# Patient Record
Sex: MALE | Race: WHITE | NOT HISPANIC OR LATINO | Employment: OTHER | URBAN - METROPOLITAN AREA
[De-identification: names, ages, dates, MRNs, and addresses within clinical notes are randomized per-mention and may not be internally consistent; named-entity substitution may affect disease eponyms.]

---

## 2017-03-06 ENCOUNTER — MYC MEDICAL ADVICE (OUTPATIENT)
Dept: FAMILY MEDICINE | Facility: CLINIC | Age: 67
End: 2017-03-06

## 2017-03-06 ENCOUNTER — OFFICE VISIT (OUTPATIENT)
Dept: FAMILY MEDICINE | Facility: CLINIC | Age: 67
End: 2017-03-06
Payer: COMMERCIAL

## 2017-03-06 ENCOUNTER — RADIANT APPOINTMENT (OUTPATIENT)
Dept: GENERAL RADIOLOGY | Facility: CLINIC | Age: 67
End: 2017-03-06
Attending: NURSE PRACTITIONER
Payer: COMMERCIAL

## 2017-03-06 VITALS
DIASTOLIC BLOOD PRESSURE: 78 MMHG | HEART RATE: 91 BPM | WEIGHT: 220.5 LBS | SYSTOLIC BLOOD PRESSURE: 126 MMHG | RESPIRATION RATE: 18 BRPM | OXYGEN SATURATION: 98 % | BODY MASS INDEX: 34.54 KG/M2 | TEMPERATURE: 98.5 F

## 2017-03-06 DIAGNOSIS — R05.9 COUGH: ICD-10-CM

## 2017-03-06 DIAGNOSIS — J18.9 PNEUMONIA OF RIGHT LOWER LOBE DUE TO INFECTIOUS ORGANISM: Primary | ICD-10-CM

## 2017-03-06 PROCEDURE — 71020 XR CHEST 2 VW: CPT

## 2017-03-06 PROCEDURE — 99214 OFFICE O/P EST MOD 30 MIN: CPT | Performed by: NURSE PRACTITIONER

## 2017-03-06 RX ORDER — AZITHROMYCIN 250 MG/1
TABLET, FILM COATED ORAL
Qty: 6 TABLET | Refills: 0 | Status: SHIPPED | OUTPATIENT
Start: 2017-03-06 | End: 2017-03-13

## 2017-03-06 RX ORDER — ALBUTEROL SULFATE 90 UG/1
2 AEROSOL, METERED RESPIRATORY (INHALATION) EVERY 4 HOURS PRN
Qty: 1 INHALER | Refills: 0 | Status: SHIPPED | OUTPATIENT
Start: 2017-03-06 | End: 2017-05-22

## 2017-03-06 RX ORDER — AZITHROMYCIN 250 MG/1
TABLET, FILM COATED ORAL
Qty: 6 TABLET | Refills: 0 | Status: SHIPPED
Start: 2017-03-06 | End: 2017-03-06

## 2017-03-06 NOTE — NURSING NOTE
"Chief Complaint   Patient presents with     URI       Initial /78  Pulse 91  Temp 98.5  F (36.9  C) (Oral)  Resp 18  Wt 220 lb 8 oz (100 kg)  SpO2 98%  BMI 34.54 kg/m2 Estimated body mass index is 34.54 kg/(m^2) as calculated from the following:    Height as of 8/19/16: 5' 7\" (1.702 m).    Weight as of this encounter: 220 lb 8 oz (100 kg).  Medication Reconciliation: complete     Minnie Espinoza MA      "

## 2017-03-06 NOTE — MR AVS SNAPSHOT
After Visit Summary   3/6/2017    Braeden Umana    MRN: [id number]           Patient Information     Date Of Birth          1950        Visit Information        Provider Department      3/6/2017 9:00 AM Treva Saleem NP [facility]        Today's Diagnoses     Pneumonia of right lower lobe due to infectious organism    -  1    Cough          Care Instructions      Pneumonia (Adult)  Pneumonia is an infection deep within the lungs. It is in the small air sacs (alveoli). Pneumonia may be caused by a virus or bacteria. Pneumonia caused by bacteria is usually treated with an antibiotic. Severe cases may need to be treated in the hospital. Milder cases can be treated at home. Symptoms usually start to get better during the first 2 days of treatment.    Home care  Follow these guidelines when caring for yourself at home:    Rest at home for the first 2 to 3 days, or until you feel stronger. Don t let yourself get overly tired when you go back to your activities.    Stay away from cigarette smoke - yours or other people s.    You may use acetaminophen or ibuprofen to control fever or pain, unless another medicine was prescribed. If you have chronic liver or kidney disease, talk with your health care provider before using these medicines. Also talk with your provider if you ve had a stomach ulcer or GI bleeding. Don t give aspirin to anyone younger than 18 years of age who is ill with a fever. It may cause severe liver damage.    Your appetite may be poor, so a light diet is fine.    Drink 6 to 8 glasses of fluids every day to make sure you are getting enough fluids. Beverages can include water, sport drinks, sodas without caffeine, juices, tea, or soup. Fluids will help loosen secretions in the lung. This will make it easier for you to cough up the phlegm (sputum). If you also have heart or kidney disease, check with your health care provider before you drink extra  fluids.    Take antibiotic medicine prescribed until it is all gone, even if you are feeling better after a few days.  Follow-up care  Follow up with your health care provider in the next 2 to 3 days, or as advised. This is to be sure the medicine is helping you get better.  If you are 65 or older, you should get a pneumococcal vaccine and a yearly flu (influenza) shot. You should also get these vaccines if you have chronic lung disease like asthma, emphysema, or COPD. Ask your provider about this.  When to seek medical advice  Call your health care provider right away if any of these occur:    You don t get better within the first 48 hours of treatment    Shortness of breath gets worse    Rapid breathing (more than 25 breaths per minute)    Coughing up blood    Chest pain gets worse with breathing    Fever of 102 F (38 C) or higher that doesn t get better with fever medicine    Weakness, dizziness, or fainting that gets worse    Thirst or dry mouth that gets worse    Sinus pain, headache, or a stiff neck    Chest pain not caused by coughing    4191-4709 [employer]. [address] [place] [address]. All rights reserved. This information is not intended as a substitute for professional medical care. Always follow your healthcare professional's instructions.              Follow-ups after your visit        Who to contact     If you have questions or need follow up information about today's clinic visit or your schedule please contact [facility] directly at [phone].  Normal or non-critical lab and imaging results will be communicated to you by MyChart, letter or phone within 4 business days after the clinic has received the results. If you do not hear from us within 7 days, please contact the clinic through MyChart or phone. If you have a critical or abnormal lab result, we will notify you by phone as soon as possible.  Submit refill requests through [employer]Co[employer] or call your  pharmacy and they will forward the refill request to us. Please allow 3 business days for your refill to be completed.          Additional Information About Your Visit        [employer] Care[employer] Information     Zuki gives you secure access to your electronic health record. If you see a primary care provider, you can also send messages to your care team and make appointments. If you have questions, please call your primary care clinic.  If you do not have a primary care provider, please call [phone] and they will assist you.        Care EveryWhere ID     This is your Care EveryWhere ID. This could be used by other organizations to access your [place] medical records  [id number]        Your Vitals Were     Pulse Temperature Respirations Pulse Oximetry BMI (Body Mass Index)       91 98.5  F (36.9  C) (Oral) 18 98% 34.54 kg/m2        Blood Pressure from Last 3 Encounters:   03/06/17 126/78   11/14/16 130/76   11/08/16 128/68    Weight from Last 3 Encounters:   03/06/17 220 lb 8 oz (100 kg)   11/14/16 228 lb (103.4 kg)   11/08/16 229 lb (103.9 kg)                 Today's Medication Changes          These changes are accurate as of: 3/6/17 10:03 AM.  If you have any questions, ask your nurse or doctor.               Start taking these medicines.        Dose/Directions    albuterol 108 (90 BASE) MCG/ACT Inhaler   Commonly known as:  PROAIR HFA/PROVENTIL HFA/VENTOLIN HFA   Used for:  Pneumonia of right lower lobe due to infectious organism   Started by:  Treva Saleem NP        Dose:  2 puff   Inhale 2 puffs into the lungs every 4 hours as needed for shortness of breath / dyspnea or wheezing   Quantity:  1 Inhaler   Refills:  0       azithromycin 250 MG tablet   Commonly known as:  ZITHROMAX   Used for:  Pneumonia of right lower lobe due to infectious organism   Started by:  Treva Saleem NP        two tablets first day and 1 tablet daily for 4 days   Quantity:  6 tablet   Refills:  0            Where to get your  medicines      These medications were sent to [facility] [place], [place] - [address]  [address], [place] [address]     Phone:  [phone]     albuterol 108 (90 BASE) MCG/ACT Inhaler    azithromycin 250 MG tablet                Primary Care Provider Office Phone # Fax #    Treva ANGELO LINDA Saleem [phone]       [facility] [address] [place] [address]  [facility] [address]        Thank you!     Thank you for choosing [facility]  for your care. Our goal is always to provide you with excellent care. Hearing back from our patients is one way we can continue to improve our services. Please take a few minutes to complete the written survey that you may receive in the mail after your visit with us. Thank you!             Your Updated Medication List - Protect others around you: Learn how to safely use, store and throw away your medicines at www.disposemymeds.org.          This list is accurate as of: 3/6/17 10:03 AM.  Always use your most recent med list.                   Brand Name Dispense Instructions for use    albuterol 108 (90 BASE) MCG/ACT Inhaler    PROAIR HFA/PROVENTIL HFA/VENTOLIN HFA    1 Inhaler    Inhale 2 puffs into the lungs every 4 hours as needed for shortness of breath / dyspnea or wheezing       ALPRAZolam 0.25 MG tablet    XANAX     Take 0.25 mg by mouth 3 times daily as needed.       amLODIPine 10 MG tablet    NORVASC    90 tablet    Take 1 tablet (10 mg) by mouth daily       aspirin 325 MG tablet      Take  by mouth daily.       atenolol 50 MG tablet    TENORMIN    90 tablet    Take 50 mg by mouth daily       azithromycin 250 MG tablet    ZITHROMAX    6 tablet    two tablets first day and 1 tablet daily for 4 days       BENADRYL 25 MG tablet   Generic drug:  diphenhydrAMINE      Take 25 mg by mouth every 6 hours as needed.       blood glucose monitoring meter device kit          CYMBALTA 60 MG EC capsule   Generic drug:   DULoxetine     0    1 CAPSULE ONCE DAILY       ezetimibe 10 MG tablet    ZETIA    90 tablet    Take 1 tablet (10 mg) by mouth daily       MULTIVITAMIN PO          NEURONTIN 300 MG capsule   Generic drug:  gabapentin     90 capsule    Take 1 capsule (300 mg) by mouth 2 times daily       nitroglycerin 0.4 MG sublingual tablet    NITROSTAT    1 tablet    Place 1 tablet under the tongue every 5 minutes as needed for chest pain. Given in clinic today at 9:30 am after consulting with DONAVAN Saleem PCP.       order for DME     1 Units    Equipment being ordered: home blood pressure monitor       PLAVIX 75 MG tablet   Generic drug:  clopidogrel     3 MONTHS    ONE DAILY       RANEXA 500 MG 12 hr tablet   Generic drug:  ranolazine      Take 500 mg by mouth daily       rosuvastatin 5 MG tablet    CRESTOR    90 tablet    Take 1 tablet (5 mg) by mouth daily

## 2017-03-06 NOTE — PROGRESS NOTES
SUBJECTIVE:                                                    Braeden Umana is a 67 year old male who presents to clinic today for the following health issues:  Pt stopped taking Crestor due to muscle cramps/aches     RESPIRATORY SYMPTOMS      Duration: this will be day 7     Description  Pt states that he moved, feeling exhausted, about a week after they moved he experienced upper respiratory symptoms. Pt states they were pulling up old carpet. 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   He has been sleeping for 20 hours per day for the last 7 days, cough will not go away, mild nausea, no fever    History (predisposing factors):  Pt's wife has similar symptoms 2 days before pt had symptoms. Pt's wife went to the hospital; not the flu.     Precipitating or alleviating factors: None    Therapies tried and outcome:  Advil PRN helps a little bit with aches      Shortness of breath, especially with any activity.  No wheezing.  Feeling of tightness in chest.  Cough is non-productive, worse with activity.   Nasal congestion, some sinus pressure.   Myalgias.        Problem list and histories reviewed & adjusted, as indicated.  Additional history: as documented        Reviewed and updated as needed this visit by clinical staff       Reviewed and updated as needed this visit by Provider         ROS:  C: NEGATIVE for fever, chills, change in weight  ENT/MOUTH: see HPI  RESP:see HPI  CV: NEGATIVE for chest pain, palpitations or peripheral edema  GI: NEGATIVE for nausea, abdominal pain, heartburn, or change in bowel habits    OBJECTIVE:                                                    /78  Pulse 91  Temp 98.5  F (36.9  C) (Oral)  Resp 18  Wt 220 lb 8 oz (100 kg)  SpO2 98%  BMI 34.54 kg/m2  Body mass index is 34.54 kg/(m^2).  GENERAL: healthy, alert and no distress  HENT: normal cephalic/atraumatic, ear canals and TM's normal, nose and  mouth without ulcers or lesions, oropharynx clear, oral mucous membranes moist and sinuses: maxillary tenderness on bilaterally  NECK: no adenopathy, no asymmetry, masses, or scars and thyroid normal to palpation  RESP: rhonchi bilaterally  CV: regular rate and rhythm, normal S1 S2, no S3 or S4, no murmur, click or rub, no peripheral edema and peripheral pulses strong  MS: no gross musculoskeletal defects noted, no edema  SKIN: no suspicious lesions or rashes         ASSESSMENT/PLAN:                                                            1. Pneumonia of right lower lobe due to infectious organism  Possible haziness RLL, read as clear by radiology.  Follow up in one week or sooner if symptoms worsen.    - albuterol (PROAIR HFA/PROVENTIL HFA/VENTOLIN HFA) 108 (90 BASE) MCG/ACT Inhaler; Inhale 2 puffs into the lungs every 4 hours as needed for shortness of breath / dyspnea or wheezing  Dispense: 1 Inhaler; Refill: 0  - azithromycin (ZITHROMAX) 250 MG tablet; two tablets first day and 1 tablet daily for 4 days  Dispense: 6 tablet; Refill: 0    2. Cough    - XR Chest 2 Views; Future        Treva Saleem NP  [facility]

## 2017-03-06 NOTE — PATIENT INSTRUCTIONS
Pneumonia (Adult)  Pneumonia is an infection deep within the lungs. It is in the small air sacs (alveoli). Pneumonia may be caused by a virus or bacteria. Pneumonia caused by bacteria is usually treated with an antibiotic. Severe cases may need to be treated in the hospital. Milder cases can be treated at home. Symptoms usually start to get better during the first 2 days of treatment.    Home care  Follow these guidelines when caring for yourself at home:    Rest at home for the first 2 to 3 days, or until you feel stronger. Don t let yourself get overly tired when you go back to your activities.    Stay away from cigarette smoke - yours or other people s.    You may use acetaminophen or ibuprofen to control fever or pain, unless another medicine was prescribed. If you have chronic liver or kidney disease, talk with your health care provider before using these medicines. Also talk with your provider if you ve had a stomach ulcer or GI bleeding. Don t give aspirin to anyone younger than 18 years of age who is ill with a fever. It may cause severe liver damage.    Your appetite may be poor, so a light diet is fine.    Drink 6 to 8 glasses of fluids every day to make sure you are getting enough fluids. Beverages can include water, sport drinks, sodas without caffeine, juices, tea, or soup. Fluids will help loosen secretions in the lung. This will make it easier for you to cough up the phlegm (sputum). If you also have heart or kidney disease, check with your health care provider before you drink extra fluids.    Take antibiotic medicine prescribed until it is all gone, even if you are feeling better after a few days.  Follow-up care  Follow up with your health care provider in the next 2 to 3 days, or as advised. This is to be sure the medicine is helping you get better.  If you are 65 or older, you should get a pneumococcal vaccine and a yearly flu (influenza) shot. You should also get these vaccines if you have  chronic lung disease like asthma, emphysema, or COPD. Ask your provider about this.  When to seek medical advice  Call your health care provider right away if any of these occur:    You don t get better within the first 48 hours of treatment    Shortness of breath gets worse    Rapid breathing (more than 25 breaths per minute)    Coughing up blood    Chest pain gets worse with breathing    Fever of 102 F (38 C) or higher that doesn t get better with fever medicine    Weakness, dizziness, or fainting that gets worse    Thirst or dry mouth that gets worse    Sinus pain, headache, or a stiff neck    Chest pain not caused by coughing    5515-3546 [employer]. [address] [place] [address]. All rights reserved. This information is not intended as a substitute for professional medical care. Always follow your healthcare professional's instructions.

## 2017-03-07 ASSESSMENT — PATIENT HEALTH QUESTIONNAIRE - PHQ9: SUM OF ALL RESPONSES TO PHQ QUESTIONS 1-9: 8

## 2017-03-13 ENCOUNTER — OFFICE VISIT (OUTPATIENT)
Dept: FAMILY MEDICINE | Facility: CLINIC | Age: 67
End: 2017-03-13
Payer: COMMERCIAL

## 2017-03-13 VITALS
TEMPERATURE: 97 F | SYSTOLIC BLOOD PRESSURE: 133 MMHG | RESPIRATION RATE: 18 BRPM | BODY MASS INDEX: 35.08 KG/M2 | DIASTOLIC BLOOD PRESSURE: 82 MMHG | OXYGEN SATURATION: 99 % | WEIGHT: 224 LBS | HEART RATE: 85 BPM

## 2017-03-13 DIAGNOSIS — J18.9 PNEUMONIA OF RIGHT LOWER LOBE DUE TO INFECTIOUS ORGANISM: Primary | ICD-10-CM

## 2017-03-13 PROCEDURE — 99212 OFFICE O/P EST SF 10 MIN: CPT | Performed by: NURSE PRACTITIONER

## 2017-03-13 NOTE — PROGRESS NOTES
SUBJECTIVE:  Braeden Umana, a 67 year old male scheduled an appointment to discuss the following issues:  Pneumonia of right lower lobe due to infectious organism   He is here for follow up of pneumonia on clinical exam.  The radiologist read his CXR as negative.  He finished ZPak last week.  His cough is improving.  He denies any fevers.  He still feels quite fatigued.  He has used his albuterol inhaler a few times but did not find it very helpful.   He does have some frontal sinus pressure but no nasal congestion.     Medical, social, surgical, and family histories reviewed.        OBJECTIVE:  /82  Pulse 85  Temp 97  F (36.1  C) (Oral)  Resp 18  Wt 224 lb (101.6 kg)  SpO2 99%  BMI 35.08 kg/m2  EXAM:  GENERAL APPEARANCE: healthy, alert and no distress  EYES: EOMI,  PERRL  HENT: ear canals and TM's normal and nose and mouth without ulcers or lesions  NECK: no adenopathy, no asymmetry, masses, or scars and thyroid normal to palpation  RESP: lungs clear to auscultation - no rales, rhonchi or wheezes  CV: regular rates and rhythm, normal S1 S2, no S3 or S4 and no murmur, click or rub -    ASSESSMENT/PLAN:  (J18.9) Pneumonia of right lower lobe due to infectious organism  (primary encounter diagnosis)  Comment: resolving  Plan: Discussed symptomatic treatment measures.  Follow up if symptoms worsen or he develops a fever.

## 2017-03-13 NOTE — NURSING NOTE
"Chief Complaint   Patient presents with     RECHECK       Initial /82  Pulse 85  Temp 97  F (36.1  C) (Oral)  Resp 18  Wt 224 lb (101.6 kg)  SpO2 99%  BMI 35.08 kg/m2 Estimated body mass index is 35.08 kg/(m^2) as calculated from the following:    Height as of 8/19/16: 5' 7\" (1.702 m).    Weight as of this encounter: 224 lb (101.6 kg).  Medication Reconciliation: complete     Minnie Espinoza MA      "

## 2017-03-13 NOTE — MR AVS SNAPSHOT
After Visit Summary   3/13/2017    Braeden Umana    MRN: [id number]           Patient Information     Date Of Birth          1950        Visit Information        Provider Department      3/13/2017 9:30 AM Treva Saleem NP [facility]        Today's Diagnoses     Pneumonia of right lower lobe due to infectious organism    -  1       Follow-ups after your visit        Who to contact     If you have questions or need follow up information about today's clinic visit or your schedule please contact [facility] directly at [phone].  Normal or non-critical lab and imaging results will be communicated to you by [employer] Enginehart, letter or phone within 4 business days after the clinic has received the results. If you do not hear from us within 7 days, please contact the clinic through [employer] or phone. If you have a critical or abnormal lab result, we will notify you by phone as soon as possible.  Submit refill requests through ImageTag or call your pharmacy and they will forward the refill request to us. Please allow 3 business days for your refill to be completed.          Additional Information About Your Visit        MyChart Information     ImageTag gives you secure access to your electronic health record. If you see a primary care provider, you can also send messages to your care team and make appointments. If you have questions, please call your primary care clinic.  If you do not have a primary care provider, please call [phone] and they will assist you.        Care EveryWhere ID     This is your Care EveryWhere ID. This could be used by other organizations to access your [place] medical records  [id number]        Your Vitals Were     Pulse Temperature Respirations Pulse Oximetry BMI (Body Mass Index)       85 97  F (36.1  C) (Oral) 18 99% 35.08 kg/m2        Blood Pressure from Last 3 Encounters:   03/13/17 133/82   03/06/17 126/78   11/14/16 130/76     Weight from Last 3 Encounters:   03/13/17 224 lb (101.6 kg)   03/06/17 220 lb 8 oz (100 kg)   11/14/16 228 lb (103.4 kg)              Today, you had the following     No orders found for display       Primary Care Provider Office Phone # Fax #    Treva ANGELO LINDA Saleem [phone]       [facility] [address] [place] [address]  [facility] [address]        Thank you!     Thank you for choosing [facility]  for your care. Our goal is always to provide you with excellent care. Hearing back from our patients is one way we can continue to improve our services. Please take a few minutes to complete the written survey that you may receive in the mail after your visit with us. Thank you!             Your Updated Medication List - Protect others around you: Learn how to safely use, store and throw away your medicines at www.disposemymeds.org.          This list is accurate as of: 3/13/17 10:09 AM.  Always use your most recent med list.                   Brand Name Dispense Instructions for use    albuterol 108 (90 BASE) MCG/ACT Inhaler    PROAIR HFA/PROVENTIL HFA/VENTOLIN HFA    1 Inhaler    Inhale 2 puffs into the lungs every 4 hours as needed for shortness of breath / dyspnea or wheezing       ALPRAZolam 0.25 MG tablet    XANAX     Take 0.25 mg by mouth 3 times daily as needed.       amLODIPine 10 MG tablet    NORVASC    90 tablet    Take 1 tablet (10 mg) by mouth daily       aspirin 325 MG tablet      Take  by mouth daily.       atenolol 50 MG tablet    TENORMIN    90 tablet    Take 50 mg by mouth daily       BENADRYL 25 MG tablet   Generic drug:  diphenhydrAMINE      Take 25 mg by mouth every 6 hours as needed.       blood glucose monitoring meter device kit      Reported on 3/13/2017       CYMBALTA 60 MG EC capsule   Generic drug:  DULoxetine     0    1 CAPSULE ONCE DAILY       ezetimibe 10 MG tablet    ZETIA    90 tablet    Take 1 tablet (10 mg) by mouth daily       NEURONTIN 300 MG  capsule   Generic drug:  gabapentin     90 capsule    Take 1 capsule (300 mg) by mouth 2 times daily       nitroglycerin 0.4 MG sublingual tablet    NITROSTAT    1 tablet    Place 1 tablet under the tongue every 5 minutes as needed for chest pain. Given in clinic today at 9:30 am after consulting with DONAVAN Saleem PCP.       order for DME     1 Units    Equipment being ordered: home blood pressure monitor       PLAVIX 75 MG tablet   Generic drug:  clopidogrel     3 MONTHS    ONE DAILY       RANEXA 500 MG 12 hr tablet   Generic drug:  ranolazine      Take 500 mg by mouth daily       rosuvastatin 5 MG tablet    CRESTOR    90 tablet    Take 1 tablet (5 mg) by mouth daily

## 2017-03-31 ENCOUNTER — TRANSFERRED RECORDS (OUTPATIENT)
Dept: HEALTH INFORMATION MANAGEMENT | Facility: CLINIC | Age: 67
End: 2017-03-31

## 2017-05-22 ENCOUNTER — OFFICE VISIT (OUTPATIENT)
Dept: FAMILY MEDICINE | Facility: CLINIC | Age: 67
End: 2017-05-22
Payer: COMMERCIAL

## 2017-05-22 VITALS
BODY MASS INDEX: 34.77 KG/M2 | SYSTOLIC BLOOD PRESSURE: 136 MMHG | HEART RATE: 93 BPM | OXYGEN SATURATION: 98 % | RESPIRATION RATE: 18 BRPM | DIASTOLIC BLOOD PRESSURE: 78 MMHG | WEIGHT: 222 LBS | TEMPERATURE: 97.9 F

## 2017-05-22 DIAGNOSIS — R05.9 COUGH: Primary | ICD-10-CM

## 2017-05-22 DIAGNOSIS — R21 SKIN RASH: ICD-10-CM

## 2017-05-22 PROCEDURE — 99214 OFFICE O/P EST MOD 30 MIN: CPT | Performed by: NURSE PRACTITIONER

## 2017-05-22 RX ORDER — ERYTHROMYCIN 250 MG/1
250 TABLET, DELAYED RELEASE ORAL 2 TIMES DAILY
Qty: 20 TABLET | Refills: 0 | Status: SHIPPED | OUTPATIENT
Start: 2017-05-22 | End: 2017-06-01

## 2017-05-22 NOTE — MR AVS SNAPSHOT
After Visit Summary   5/22/2017    Braeden Umana    MRN: [id number]           Patient Information     Date Of Birth          1950        Visit Information        Provider Department      5/22/2017 2:45 PM Treva Saleem NP [facility]        Today's Diagnoses     Cough    -  1    Skin rash          Care Instructions    Let me know if no improvement with the cough over the next few weeks or sooner if symptoms worsen.    Also let me know if the skin infection does not completely clear.         Follow-ups after your visit        Future tests that were ordered for you today     Open Future Orders        Priority Expected Expires Ordered    Spirometry, Breathing Capacity Routine  7/6/2017 5/22/2017            Who to contact     If you have questions or need follow up information about today's clinic visit or your schedule please contact Critical access hospital directly at [phone].  Normal or non-critical lab and imaging results will be communicated to you by Newswiredhart, letter or phone within 4 business days after the clinic has received the results. If you do not hear from us within 7 days, please contact the clinic through Newswiredhart or phone. If you have a critical or abnormal lab result, we will notify you by phone as soon as possible.  Submit refill requests through Fiestah or call your pharmacy and they will forward the refill request to us. Please allow 3 business days for your refill to be completed.          Additional Information About Your Visit        MyChart Information     Fiestah gives you secure access to your electronic health record. If you see a primary care provider, you can also send messages to your care team and make appointments. If you have questions, please call your primary care clinic.  If you do not have a primary care provider, please call [phone] and they will assist you.        Care EveryWhere ID     This is your Care EveryWhere ID.  This could be used by other organizations to access your [facility] records  [id number]        Your Vitals Were     Pulse Temperature Respirations Pulse Oximetry BMI (Body Mass Index)       93 97.9  F (36.6  C) (Oral) 18 98% 34.77 kg/m2        Blood Pressure from Last 3 Encounters:   05/22/17 136/78   03/13/17 133/82   03/06/17 126/78    Weight from Last 3 Encounters:   05/22/17 222 lb (100.7 kg)   03/13/17 224 lb (101.6 kg)   03/06/17 220 lb 8 oz (100 kg)                 Today's Medication Changes          These changes are accurate as of: 5/22/17  3:46 PM.  If you have any questions, ask your nurse or doctor.               Start taking these medicines.        Dose/Directions    beclomethasone 40 MCG/ACT Inhaler   Commonly known as:  QVAR   Used for:  Cough        Dose:  2 puff   Inhale 2 puffs into the lungs 2 times daily   Quantity:  1 Inhaler   Refills:  0       erythromycin 250 MG Tbec EC tablet   Commonly known as:  NITA-TAB   Used for:  Skin rash        Dose:  250 mg   Take 1 tablet (250 mg) by mouth 2 times daily for 10 days   Quantity:  20 tablet   Refills:  0            Where to get your medicines      These medications were sent to [facility] - [place], [place] - [address]  [address], [place] [address]     Phone:  [phone]     beclomethasone 40 MCG/ACT Inhaler    erythromycin 250 MG Tbec EC tablet                Primary Care Provider Office Phone # Fax #    Treva Saleem [phone]       [facility] [address] [facility] [address]        Thank you!     Thank you for choosing [facility]  for your care. Our goal is always to provide you with excellent care. Hearing back from our patients is one way we can continue to improve our services. Please take a few minutes to complete the written survey that you may receive in the mail after your visit with us. Thank you!             Your Updated Medication List  - Protect others around you: Learn how to safely use, store and throw away your medicines at www.disposemymeds.org.          This list is accurate as of: 5/22/17  3:46 PM.  Always use your most recent med list.                   Brand Name Dispense Instructions for use    ALPRAZolam 0.25 MG tablet    XANAX     Take 0.25 mg by mouth 3 times daily as needed.       amLODIPine 10 MG tablet    NORVASC    90 tablet    Take 1 tablet (10 mg) by mouth daily       aspirin 325 MG tablet      Take  by mouth daily.       atenolol 50 MG tablet    TENORMIN    90 tablet    Take 50 mg by mouth daily       beclomethasone 40 MCG/ACT Inhaler    QVAR    1 Inhaler    Inhale 2 puffs into the lungs 2 times daily       BENADRYL 25 MG tablet   Generic drug:  diphenhydrAMINE      Take 25 mg by mouth every 6 hours as needed.       CYMBALTA 60 MG EC capsule   Generic drug:  DULoxetine     0    1 CAPSULE ONCE DAILY       erythromycin 250 MG Tbec EC tablet    NITA-TAB    20 tablet    Take 1 tablet (250 mg) by mouth 2 times daily for 10 days       ezetimibe 10 MG tablet    ZETIA    90 tablet    Take 1 tablet (10 mg) by mouth daily       NEURONTIN 300 MG capsule   Generic drug:  gabapentin     90 capsule    Take 1 capsule (300 mg) by mouth 2 times daily       nitroglycerin 0.4 MG sublingual tablet    NITROSTAT    1 tablet    Place 0.4 mg under the tongue every 5 minutes as needed for chest pain Reported on 5/22/2017       order for DME     1 Units    Equipment being ordered: home blood pressure monitor       PLAVIX 75 MG tablet   Generic drug:  clopidogrel     3 MONTHS    ONE DAILY       RANEXA 500 MG 12 hr tablet   Generic drug:  ranolazine      Take 500 mg by mouth daily

## 2017-05-22 NOTE — PROGRESS NOTES
"  SUBJECTIVE:                                                    Braeden Umana is a 67 year old male who presents to clinic today for the following health issues:      RESPIRATORY SYMPTOMS      Duration: In February he had URI that lingered. Since that time he has occasional symptoms     Accompanying signs and symptoms: intermittent chest tightness, cough. Related to asthma?    History (predisposing factors):  asthma    Precipitating or alleviating factors: None    Therapies tried and outcome:  None. He did not refill his inhaler   He was treated with ZPak and albuterol.  CXR was read as negative.  Shallow cough, upper chest tightness intermittently.  May occur about half of the days and can last hours or less.  Feels \"dry\".  Not worse with eating or lying down.  Exercise might make it worse.  It is not typical of his heart symptoms.   Possible seasonal allergies.  Used all of the albuterol inhaler.    No fevers.  No post nasal drainage, sinus pressure.   No acid reflux.          Chronic infection in anal region  Culture showed streptococcus. He was treated with EES which he felt helped 95 %.  But symptoms gradually returned.         Problem list and histories reviewed & adjusted, as indicated.  Additional history: as documented        Reviewed and updated as needed this visit by clinical staff       Reviewed and updated as needed this visit by Provider         ROS:  C: NEGATIVE for fever, chills, change in weight  INTEGUMENTARY/SKIN: see HPI  E/M: NEGATIVE for ear, mouth and throat problems  RESP:see HPI  CV: NEGATIVE for chest pain, palpitations or peripheral edema  GI: NEGATIVE for nausea, abdominal pain, heartburn, or change in bowel habits  MUSCULOSKELETAL: NEGATIVE for significant arthralgias or myalgia  NEURO: NEGATIVE for weakness, dizziness or paresthesias  PSYCHIATRIC: NEGATIVE for changes in mood or affect    OBJECTIVE:                                                    /78  Pulse 93  Temp " 97.9  F (36.6  C) (Oral)  Resp 18  Wt 222 lb (100.7 kg)  SpO2 98%  BMI 34.77 kg/m2  Body mass index is 34.77 kg/(m^2).  GENERAL: healthy, alert and no distress  HENT: ear canals and TM's normal, nose and mouth without ulcers or lesions  NECK: no adenopathy, no asymmetry, masses, or scars and thyroid normal to palpation  RESP: lungs clear to auscultation - no rales, rhonchi or wheezes  CV: regular rate and rhythm, normal S1 S2, no S3 or S4, no murmur, click or rub, no peripheral edema and peripheral pulses strong  ABDOMEN: soft, nontender, no hepatosplenomegaly, no masses and bowel sounds normal  MS: no gross musculoskeletal defects noted, no edema  PSYCH: mentation appears normal, affect normal/bright         ASSESSMENT/PLAN:                                                            1. Cough  Unable to do a spirometry due to equipment not functioning and he declines a referral for PFTs.  Differential includes post-viral cough, allergies, asthma, GERD  Will try an inhaled steroid.  If symptoms are not improving over the next 2-3 weeks will consider a trial of a PPI and referral to pulmonology.    - beclomethasone (QVAR) 40 MCG/ACT Inhaler; Inhale 2 puffs into the lungs 2 times daily  Dispense: 1 Inhaler; Refill: 0    2. Skin rash  Will do a longer course of antibiotic.  He will follow up if symptoms persist.   - erythromycin (NITA-TAB) 250 MG TBEC EC tablet; Take 1 tablet (250 mg) by mouth 2 times daily for 10 days  Dispense: 20 tablet; Refill: 0        Treva Saleem NP  [facility]

## 2017-05-22 NOTE — NURSING NOTE
"Chief Complaint   Patient presents with     URI     Rectal Problem       Initial /78  Pulse 93  Temp 97.9  F (36.6  C) (Oral)  Resp 18  Wt 222 lb (100.7 kg)  SpO2 98%  BMI 34.77 kg/m2 Estimated body mass index is 34.77 kg/(m^2) as calculated from the following:    Height as of 8/19/16: 5' 7\" (1.702 m).    Weight as of this encounter: 222 lb (100.7 kg).  Medication Reconciliation: complete     Minnie Espinoza MA      "

## 2017-05-22 NOTE — PATIENT INSTRUCTIONS
Let me know if no improvement with the cough over the next few weeks or sooner if symptoms worsen.    Also let me know if the skin infection does not completely clear.

## 2017-05-22 NOTE — LETTER
My Depression Action Plan  Name: Braeden Umana   Date of Birth 1950  Date: 5/22/2017    My doctor: Treva Saleem   My clinic: [address] [id number] [phone]          GREEN    ZONE   Good Control    What it looks like:     Things are going generally well. You have normal up s and down s. You may even feel depressed from time to time, but bad moods usually last less than a day.   What you need to do:  1. Continue to care for yourself (see self care plan)  2. Check your depression survival kit and update it as needed  3. Follow your physician s recommendations including any medication.  4. Do not stop taking medication unless you consult with your physician first.           YELLOW         ZONE Getting Worse    What it looks like:     Depression is starting to interfere with your life.     It may be hard to get out of bed; you may be starting to isolate yourself from others.    Symptoms of depression are starting to last most all day and this has happened for several days.     You may have suicidal thoughts but they are not constant.   What you need to do:     1. Call your care team, your response to treatment will improve if you keep your care team informed of your progress. Yellow periods are signs an adjustment may need to be made.     2. Continue your self-care, even if you have to fake it!    3. Talk to someone in your support network    4. Open up your depression survival kit           RED    ZONE Medical Alert - Get Help    What it looks like:     Depression is seriously interfering with your life.     You may experience these or other symptoms: You can t get out of bed most days, can t work or engage in other necessary activities, you have trouble taking care of basic hygiene, or basic responsibilities, thoughts of suicide or death that will not go away, self-injurious behavior.     What you need to do:  1. Call your care team  and request a same-day appointment. If they are not available (weekends or after hours) call your local crisis line, emergency room or 911.      Electronically signed by: Minnie Espinoza, May 22, 2017    Depression Self Care Plan / Survival Kit    Self-Care for Depression  Here s the deal. Your body and mind are really not as separate as most people think.  What you do and think affects how you feel and how you feel influences what you do and think. This means if you do things that people who feel good do, it will help you feel better.  Sometimes this is all it takes.  There is also a place for medication and therapy depending on how severe your depression is, so be sure to consult with your medical provider and/ or Behavioral Health Consultant if your symptoms are worsening or not improving.     In order to better manage my stress, I will:    Exercise  Get some form of exercise, every day. This will help reduce pain and release endorphins, the  feel good  chemicals in your brain. This is almost as good as taking antidepressants!  This is not the same as joining a gym and then never going! (they count on that by the way ) It can be as simple as just going for a walk or doing some gardening, anything that will get you moving.      Hygiene   Maintain good hygiene (Get out of bed in the morning, Make your bed, Brush your teeth, Take a shower, and Get dressed like you were going to work, even if you are unemployed).  If your clothes don't fit try to get ones that do.    Diet  I will strive to eat foods that are good for me, drink plenty of water, and avoid excessive sugar, caffeine, alcohol, and other mood-altering substances.  Some foods that are helpful in depression are: complex carbohydrates, B vitamins, flaxseed, fish or fish oil, fresh fruits and vegetables.    Psychotherapy  I agree to participate in Individual Therapy (if recommended).    Medication  If prescribed medications, I agree to take them.  Missing doses  can result in serious side effects.  I understand that drinking alcohol, or other illicit drug use, may cause potential side effects.  I will not stop my medication abruptly without first discussing it with my provider.    Staying Connected With Others  I will stay in touch with my friends, family members, and my primary care provider/team.    Use your imagination  Be creative.  We all have a creative side; it doesn t matter if it s oil painting, sand castles, or mud pies! This will also kick up the endorphins.    Witness Beauty  (AKA stop and smell the roses) Take a look outside, even in mid-winter. Notice colors, textures. Watch the squirrels and birds.     Service to others  Be of service to others.  There is always someone else in need.  By helping others we can  get out of ourselves  and remember the really important things.  This also provides opportunities for practicing all the other parts of the program.    Humor  Laugh and be silly!  Adjust your TV habits for less news and crime-drama and more comedy.    Control your stress  Try breathing deep, massage therapy, biofeedback, and meditation. Find time to relax each day.     My support system    Clinic Contact:  Phone number:    Contact 1:  Phone number:    Contact 2:  Phone number:    Amish/:  Phone number:    Therapist:  Phone number:    Local crisis center:    Phone number:    Other community support:  Phone number:

## 2017-06-28 DIAGNOSIS — E78.5 HYPERLIPIDEMIA LDL GOAL <70: ICD-10-CM

## 2017-06-30 RX ORDER — EZETIMIBE 10 MG/1
10 TABLET ORAL DAILY
Qty: 90 TABLET | Refills: 0 | Status: SHIPPED | OUTPATIENT
Start: 2017-06-30 | End: 2018-01-29

## 2017-06-30 NOTE — TELEPHONE ENCOUNTER
Routing refill request to provider for review/approval because:  Labs not current:  Lipid panel and ALT    Lab ordered.    Tina-Please sign if agree.    Team coordinators-Please contact patient to schedule lab appt.  Patient should fast 10 hours.  Sips of water and taking usual morning medications is fine.    Thank you!  OLIVER Mead, MICHELLEN, RN

## 2017-08-28 ENCOUNTER — OFFICE VISIT (OUTPATIENT)
Dept: FAMILY MEDICINE | Facility: CLINIC | Age: 67
End: 2017-08-28
Payer: COMMERCIAL

## 2017-08-28 VITALS
HEART RATE: 87 BPM | RESPIRATION RATE: 20 BRPM | DIASTOLIC BLOOD PRESSURE: 84 MMHG | TEMPERATURE: 97.5 F | BODY MASS INDEX: 34.14 KG/M2 | SYSTOLIC BLOOD PRESSURE: 136 MMHG | WEIGHT: 218 LBS | OXYGEN SATURATION: 98 %

## 2017-08-28 DIAGNOSIS — H91.90 HEARING LOSS, UNSPECIFIED HEARING LOSS TYPE, UNSPECIFIED LATERALITY: ICD-10-CM

## 2017-08-28 DIAGNOSIS — H61.23 BILATERAL IMPACTED CERUMEN: Primary | ICD-10-CM

## 2017-08-28 PROCEDURE — 69210 REMOVE IMPACTED EAR WAX UNI: CPT | Performed by: NURSE PRACTITIONER

## 2017-08-28 ASSESSMENT — PATIENT HEALTH QUESTIONNAIRE - PHQ9: SUM OF ALL RESPONSES TO PHQ QUESTIONS 1-9: 3

## 2017-08-28 NOTE — NURSING NOTE
"Chief Complaint   Patient presents with     Ear Problem       Initial /84  Pulse 87  Temp 97.5  F (36.4  C) (Oral)  Resp 20  Wt 218 lb (98.9 kg)  SpO2 98%  BMI 34.14 kg/m2 Estimated body mass index is 34.14 kg/(m^2) as calculated from the following:    Height as of 8/19/16: 5' 7\" (1.702 m).    Weight as of this encounter: 218 lb (98.9 kg).  Medication Reconciliation: complete       Chance Huynh MA     Braeden Umana is a 67 year old male who presents in clinic with complaint of impacted ear wax (cerumen).  Per the order of jacoby sinclair, ear wax was removed from both sides by flushing with warm water and colace solution and manual debridement has not been performed. Patient denies pain/dizziness/discharge/drainage  (if yes, stop procedure and huddle with provider).  Ear wax has been removed from right ear- has  not been successfully removed from left (If not, huddle with provider).   Chance Huynh          "

## 2017-08-28 NOTE — PROGRESS NOTES
SUBJECTIVE:   Braeden Umana is a 67 year old male who presents to clinic today for the following health issues:  Chief Complaint   Patient presents with     Ear Problem     Marciano reports that he has been having some problems with his hearing.  He has a history of ear wax and he thinks the wax is significant today.  He has no ear pain.  No fever or URI symptoms.  The ear sensation/hearing loss is in both ears.  He had his ears irrigated several years ago.    Problem list and histories reviewed & adjusted, as indicated.  Additional history: as documented    Patient Active Problem List   Diagnosis     Coronary atherosclerosis     Hypertrophy (benign) of prostate     Idiopathic urticaria     ANXIETY      Esophageal reflux     Idiopathic angioedema     Mild recurrent major depression (H)     Hyperlipidemia LDL goal <70     Carotid artery stenosis     Advanced directives, counseling/discussion     Hypertension goal BP (blood pressure) < 140/90     Achilles bursitis or tendinitis     Sleep apnea     Past Surgical History:   Procedure Laterality Date     SURGICAL HISTORY OF -       sinus surgery     SURGICAL HISTORY OF -       angiogram and stent placement     SURGICAL HISTORY OF -       orchioplexy     SURGICAL HISTORY OF -   12/31/12    angiogram and 4 stents     SURGICAL HISTORY OF -   2/26/13    angiogram and stent     SURGICAL HISTORY OF -   1/15    angiogram and stents     SURGICAL HISTORY OF -   5/15    angiogram and stent       Social History   Substance Use Topics     Smoking status: Former Smoker     Smokeless tobacco: Never Used      Comment: x30 years+     Alcohol use No     Family History   Problem Relation Age of Onset     C.A.D. Mother      bypass     Hypertension Mother      C.A.D. Father      late 50's     Hypertension Father      Neurologic Disorder Father      parkinsons     C.A.D. Brother      x2/CAD angioplasty age 54     Allergies Brother      DIABETES No family hx of      CEREBROVASCULAR DISEASE  No family hx of      Cancer - colorectal No family hx of      Prostate Cancer No family hx of          Current Outpatient Prescriptions   Medication Sig Dispense Refill     ezetimibe (ZETIA) 10 MG tablet Take 1 tablet (10 mg) by mouth daily 90 tablet 0     beclomethasone (QVAR) 40 MCG/ACT Inhaler Inhale 2 puffs into the lungs 2 times daily 1 Inhaler 0     RANEXA 500 MG 12 hr tablet Take 500 mg by mouth daily        amLODIPine (NORVASC) 10 MG tablet Take 1 tablet (10 mg) by mouth daily 90 tablet 3     ORDER FOR DME Equipment being ordered: home blood pressure monitor 1 Units 0     atenolol (TENORMIN) 50 MG tablet Take 50 mg by mouth daily  90 tablet 0     gabapentin (NEURONTIN) 300 MG capsule Take 1 capsule (300 mg) by mouth 2 times daily 90 capsule      ALPRAZolam (XANAX) 0.25 MG tablet Take 0.25 mg by mouth 3 times daily as needed.       aspirin 325 MG tablet Take  by mouth daily.       diphenhydrAMINE (BENADRYL) 25 MG tablet Take 25 mg by mouth every 6 hours as needed.       nitroglycerin (NITROSTAT) 0.4 MG SL tablet Place 0.4 mg under the tongue every 5 minutes as needed for chest pain Reported on 5/22/2017 1 tablet 0     CYMBALTA 60 MG OR CPEP 1 CAPSULE ONCE DAILY 0 0     PLAVIX 75 MG OR TABS ONE DAILY 3 MONTHS 0     Allergies   Allergen Reactions     Contrast Dye Other (See Comments)     IV contrast dye-total cardiac arrest     Apple Hives     angioedema     Cashews [Nuts] Hives     Angio edema     Citric Acid      Codeine      sensitive     Coreg [Carvedilol] Difficulty breathing     Keflex [Cephalexin Monohydrate] Swelling     Angioedema - possibly due to Keflex     [place] Oil Hives     angioedema     Shrimp Hives     angioedema     Simvastatin      Zocor      Recent Labs   Lab Test 05/27/16 02/20/16  12/15/15   0753  04/28/15 02/18/15  02/18/14   09/08/10   1443   LDL   --    --   98   --   59  103   < >  143*   < >   --    HDL   --    --   29*   --   27  26   < >  29   < >   --    TRIG   --    --   177*    --   102  100   < >  146   < >   --    ALT   --    --    --    --   25  21   --   28   < >  13   CR  1.32  1.22   --    < >  0.96   --    < >  1.20   < >  1.18   GFRESTIMATED  54  59   --    < >  >60   --    < >  >60   < >  63   GFRESTBLACK  >60  >60   --    < >   --    --    < >   --    < >  76   POTASSIUM  3.9  3.3   --    < >  4.3   --    < >   --    < >  4.7   TSH   --    --    --    --    --    --    --    --    --   1.38    < > = values in this interval not displayed.      BP Readings from Last 3 Encounters:   08/28/17 136/84   05/22/17 136/78   03/13/17 133/82    Wt Readings from Last 3 Encounters:   08/28/17 218 lb (98.9 kg)   05/22/17 222 lb (100.7 kg)   03/13/17 224 lb (101.6 kg)                  Labs reviewed in EPIC      Reviewed and updated as needed this visit by clinical staffTobacco  Allergies  Med Hx  Surg Hx  Fam Hx  Soc Hx      Reviewed and updated as needed this visit by Provider         ROS:  Constitutional, HEENT, cardiovascular, pulmonary, gi and gu systems are negative, except as otherwise noted.      OBJECTIVE:   /84  Pulse 87  Temp 97.5  F (36.4  C) (Oral)  Resp 20  Wt 218 lb (98.9 kg)  SpO2 98%  BMI 34.14 kg/m2  Body mass index is 34.14 kg/(m^2).  EXAM:  Constitutional: healthy, alert, active and no distress  Neck: Neck supple. No adenopathy.  ENT: Bilateral TM's are blocked with cerumen impaction.   After irrigation the right EAC is clear and the TM is normal.  After irrigation and manual removal of cerumen from the left EAC by myself,  the left EAC still has an approximate 30% rim of wax on his lower canal. The visible TM is normal.  Posterior oropharynx is clear.  Nares clear without congestion.  Skin: warm and dry  Psychiatric: mentation appears normal. and affect normal/bright      ASSESSMENT/PLAN:     (H61.23) Bilateral impacted cerumen  (primary encounter diagnosis)  Comment:   Plan: HC REMOVAL IMPACTED CERUMEN IRRIGATION/LVG         UNILAT            (H91.90)  Hearing loss, unspecified hearing loss type, unspecified laterality  Comment:   Plan: AUDIOLOGY ADULT REFERRAL        After the irrigation and manual removal of the cerumen, Marciano reports that he feels much better and that his hearing is sharper, but he still does not feel that his hearing is perfect.  For the left EAC that has some amount of cerumen left, he will use home Debrox for another 2-3 days.  He will follow-up with an audiogram.  She was appreciative and will follow-up with primary care if any additional concerns.        JEREMY Cuevas [facility]

## 2017-08-28 NOTE — MR AVS SNAPSHOT
After Visit Summary   8/28/2017    Braeden Umana    MRN: [id number]           Patient Information     Date Of Birth          1950        Visit Information        Provider Department      8/28/2017 10:20 AM Elin Goldman APRN [facility]        Today's Diagnoses     Bilateral impacted cerumen    -  1    Hearing loss, unspecified hearing loss type, unspecified laterality          Care Instructions    Be seen by Audiology for a hearing evaluation.  If there is anything else that I can do for you, please do not hesitate to let me know.    Impacted Earwax    Impacted earwax is a buildup of the natural wax in the ear (cerumen). Impacted earwax is very common. It can cause symptoms such as hearing loss. It can also stop a doctor doing an exam of your ear.  Understanding earwax  Tiny glands in your ear make substances that combine with dead skin cells to form earwax. Earwax helps protect your ear canal from water, dirt, infection, and injury. Over time, earwax travels from the inner part of your ear canal to the entrance of the canal. Then it falls away naturally. But in some cases, it can t travel to the entrance of the canal. This may be because of a health condition or objects put in the ear. With age, earwax tends to become harder and less fluid. Older adults are more likely to have problems with earwax buildup.  What causes impacted earwax?  Earwax can build up because of many health conditions. Some cause a physical blockage. Others cause too much earwax to be made. Health conditions that can cause earwax buildup include:    Bony blockage in the ear (osteoma or exostoses)    Infections, such as swimmer s ear (external otitis)    Skin disease, such as eczema    Autoimmune diseases, such as lupus    A narrowed ear canal from birth, chronic inflammation, or injury    Too much earwax because of injury    Too much earwax because of  water in the ear canal  Objects  repeatedly placed in the ear can also cause impacted earwax. For example, putting cotton swabs in the ear may push the wax deeper into the ear. Over time, this may cause blockage. Hearing aids, swimming plugs, and swim molds can cause the same problem when used again and again.  In some cases, the cause of impacted earwax is not known.  Symptoms of impacted earwax  Excess earwax usually does not cause any symptoms, unless there is a large amount of buildup. Then it may cause symptoms such as:    Hearing loss    Earache    Sense of ear fullness    Itching in the ear    Dizziness    Ringing in the ears    Cough  Treatment for impacted earwax  If you don t have symptoms, you may not need treatment. Often the earwax goes away on its own with time. If you have symptoms, you may have 1 or more treatments such as:    Ear drops. These help to soften the earwax. This helps it leave the ear over time.    Rinsing (irrigation) of the ear canal with water. This is done in a doctor s office.    Removal of the earwax with small tools. This is also done in a doctor s office.  In rare cases, some treatments for earwax removal may cause complications such as:    Swimmer s ear (otitis external)    Earache    Short-term hearing loss    Dizziness    Water trapped in the ear canal    Hole in the eardrum    Ringing in the ears    Bleeding from the ear  Talk with your health care provider about which risks apply most to you.  Don t use these at home  Health care providers do not advise use of ear candles or ear vacuum kits. These methods are not shown to work.   Preventing impacted earwax  You may not be able to prevent impacted earwax if you have a health condition that causes it, such as eczema. In other cases, you may be able to prevent earwax buildup by:    Using ear drops once a week    Having routine cleaning of the ear about every 6 months    Not using cotton swabs in the ear  When to call the health care provider  Call your health  care provider right away if you have severe symptoms after earwax removal. These may include bleeding or severe ear pain.   Date Last Reviewed: 3/19/2015    8944-8384 [employer]. [address] [place] [address], [place] [address]. All rights reserved. This information is not intended as a substitute for professional medical care. Always follow your healthcare professional's instructions.            Earwax, Home Treatment    Everyone produces earwax from the lining of the ear canal. It serves to lubricate and protect the ear. The wax that forms in the canal naturally moves toward the outside of the ear and falls out. Sometimes the ear canal may contain too much wax. This can cause a blockage and loss of hearing. Directions are given below for home treatment.  Home care  If your doctor has advised you to remove a wax blockage yourself, follow these directions:    Unless a medicine was prescribed, you may use an over-the-counter product made for clearing earwax. These contain carbamide peroxide. Lie down with the blocked ear facing upward. Apply one dropper full of medicine and wait a few minutes. Grasp the outer ear and wiggle it to help the solution enter the canal.    Lean over a sink or basin with the blocked ear facing downward. Use a bulb syringe filled with warm (not hot or cold) water to rinse the ear several times. Use gentle pressure only.    If you are having trouble draining the water out of your ear canal, put a few drops of rubbing alcohol (isopropyl alcohol) into the ear canal. This will help remove the remaining water.    Repeat this procedure once a day for up to three days, or until your hearing is back to normal. Do not use this treatment for more than three days in a row.  Don ts    Don t use cold water to rinse the ear. This will make you dizzy.    Don t perform this procedure if you have an ear infection.    Don t perform this procedure if you have a ruptured eardrum.    Don t use cotton  swabs, matches, hairpins, keys, or other objects to  clean  the ear canal. This can cause infection of the ear canal or rupture the eardrum. Because of their size and shape, cotton swabs can push earwax deeper into the ear canal instead of removing it.  Follow-up care  Follow up with your health care provider if you are not improving after three cleaning attempts, or as advised.  When to seek medical advice  Call your health care provider right away if any of these occur:    Worsening ear pain    Fever of 101 F (38.3 C) or higher, or as directed by your health care provider    Hearing does not return to normal after three days of treatment    Fluid drainage or bleeding from the ear canal    Swelling, redness, or tenderness of the outer ear    Headache, neck pain, or stiff neck    2775-6684 [employer]. [address] [place] [address]. All rights reserved. This information is not intended as a substitute for professional medical care. Always follow your healthcare professional's instructions.        Understanding Hearing Loss    As you age, some hearing loss is normal. But long-term exposure to loud noise can speed up the loss. You lose more than the ability to hear how loud a sound is. You also lose the ability to hear certain types of sounds. For example, you might not be able to hear some of the high-pitched sounds of a child's voice.  Normal loss  Each of us is born with about 40,000 hair cells. They thin out naturally as we age. With the loss of hair cells comes hearing loss. This is called presbycusis. Most people don't notice normal hearing loss until their middle years. Others might not notice it until late in their lives. It's most often a slow and painless process.  Accelerated loss  Exposure to loud noise may cause brief hearing loss and ringing in your ears called tinnitus. If your exposure was short, you may recover. But long-term exposure day after day can affect your hearing for  life.  Noise hurts more than your hearing  Did you know that loud noises can affect your whole body? Loud noises can:    Raise blood pressure    Disrupt sleep patterns    Cause muscle strain    Harm digestion   Date Last Reviewed: 3/31/2015    8729-7003 [employer]. [address] [place] [address]. All rights reserved. This information is not intended as a substitute for professional medical care. Always follow your healthcare professional's instructions.                Follow-ups after your visit        Additional Services     AUDIOLOGY ADULT REFERRAL       Your provider has referred you to: ealth: Audiology and Aural Rehab Services - [place] [phone]   [email or web address]    Specialty Testing:  Audiogram                  Who to contact     If you have questions or need follow up information about today's clinic visit or your schedule please contact [facility] directly at [phone].  Normal or non-critical lab and imaging results will be communicated to you by [email or web address], letter or phone within 4 business days after the clinic has received the results. If you do not hear from us within 7 days, please contact the clinic through [employer] or phone. If you have a critical or abnormal lab result, we will notify you by phone as soon as possible.  Submit refill requests through Ansible or call your pharmacy and they will forward the refill request to us. Please allow 3 business days for your refill to be completed.          Additional Information About Your Visit        [email or web address] Information     Ansible gives you secure access to your electronic health record. If you see a primary care provider, you can also send messages to your care team and make appointments. If you have questions, please call your primary care clinic.  If you do not have a primary care provider, please call [phone] and they will  assist you.        Care EveryWhere ID     This is your Care EveryWhere ID. This could be used by other organizations to access your [facility] medical records  [id number]        Your Vitals Were     Pulse Temperature Respirations Pulse Oximetry BMI (Body Mass Index)       87 97.5  F (36.4  C) (Oral) 20 98% 34.14 kg/m2        Blood Pressure from Last 3 Encounters:   08/28/17 136/84   05/22/17 136/78   03/13/17 133/82    Weight from Last 3 Encounters:   08/28/17 218 lb (98.9 kg)   05/22/17 222 lb (100.7 kg)   03/13/17 224 lb (101.6 kg)              We Performed the Following     AUDIOLOGY ADULT REFERRAL        Primary Care Provider Office Phone # Fax #    Treva Saleem [phone] [address] [facility] [address]        Equal Access to Services     [facility] : Hadii aad ku hadasho Soomaali, waaxda luqadaha, qaybta kaalmada adeegyada, waxay idiin hayaan ambrose gan . So [facility] [phone].    ATENCIÓN: Si habla español, tiene a guajardo disposición servicios gratuitos de asistencia lingüística. Hebert al [phone].    We comply with applicable federal civil rights laws and [place] laws. We do not discriminate on the basis of race, color, national origin, age, disability sex, sexual orientation or gender identity.            Thank you!     Thank you for choosing [facility]  for your care. Our goal is always to provide you with excellent care. Hearing back from our patients is one way we can continue to improve our services. Please take a few minutes to complete the written survey that you may receive in the mail after your visit with us. Thank you!             Your Updated Medication List - Protect others around you: Learn how to safely use, store and throw away your medicines at www.disposemymeds.org.          This list is accurate as of: 8/28/17 10:34 AM.  Always use your most recent med list.                   Brand Name Dispense Instructions for use  Diagnosis    ALPRAZolam 0.25 MG tablet    XANAX     Take 0.25 mg by mouth 3 times daily as needed.        amLODIPine 10 MG tablet    NORVASC    90 tablet    Take 1 tablet (10 mg) by mouth daily    Hypertension goal BP (blood pressure) < 140/90       aspirin 325 MG tablet      Take  by mouth daily.        atenolol 50 MG tablet    TENORMIN    90 tablet    Take 50 mg by mouth daily    Hypertension goal BP (blood pressure) < 140/90       beclomethasone 40 MCG/ACT Inhaler    QVAR    1 Inhaler    Inhale 2 puffs into the lungs 2 times daily    Cough       BENADRYL 25 MG tablet   Generic drug:  diphenhydrAMINE      Take 25 mg by mouth every 6 hours as needed.        CYMBALTA 60 MG EC capsule   Generic drug:  DULoxetine     0    1 CAPSULE ONCE DAILY        ezetimibe 10 MG tablet    ZETIA    90 tablet    Take 1 tablet (10 mg) by mouth daily    Hyperlipidemia LDL goal <70       NEURONTIN 300 MG capsule   Generic drug:  gabapentin     90 capsule    Take 1 capsule (300 mg) by mouth 2 times daily        nitroGLYcerin 0.4 MG sublingual tablet    NITROSTAT    1 tablet    Place 0.4 mg under the tongue every 5 minutes as needed for chest pain Reported on 5/22/2017        order for DME     1 Units    Equipment being ordered: home blood pressure monitor    Hypertension goal BP (blood pressure) < 140/90       PLAVIX 75 MG tablet   Generic drug:  clopidogrel     3 MONTHS    ONE DAILY        RANEXA 500 MG 12 hr tablet   Generic drug:  ranolazine      Take 500 mg by mouth daily

## 2017-08-28 NOTE — PATIENT INSTRUCTIONS
Be seen by Audiology for a hearing evaluation.  If there is anything else that I can do for you, please do not hesitate to let me know.    Impacted Earwax    Impacted earwax is a buildup of the natural wax in the ear (cerumen). Impacted earwax is very common. It can cause symptoms such as hearing loss. It can also stop a doctor doing an exam of your ear.  Understanding earwax  Tiny glands in your ear make substances that combine with dead skin cells to form earwax. Earwax helps protect your ear canal from water, dirt, infection, and injury. Over time, earwax travels from the inner part of your ear canal to the entrance of the canal. Then it falls away naturally. But in some cases, it can t travel to the entrance of the canal. This may be because of a health condition or objects put in the ear. With age, earwax tends to become harder and less fluid. Older adults are more likely to have problems with earwax buildup.  What causes impacted earwax?  Earwax can build up because of many health conditions. Some cause a physical blockage. Others cause too much earwax to be made. Health conditions that can cause earwax buildup include:    Bony blockage in the ear (osteoma or exostoses)    Infections, such as swimmer s ear (external otitis)    Skin disease, such as eczema    Autoimmune diseases, such as lupus    A narrowed ear canal from birth, chronic inflammation, or injury    Too much earwax because of injury    Too much earwax because of  water in the ear canal  Objects repeatedly placed in the ear can also cause impacted earwax. For example, putting cotton swabs in the ear may push the wax deeper into the ear. Over time, this may cause blockage. Hearing aids, swimming plugs, and swim molds can cause the same problem when used again and again.  In some cases, the cause of impacted earwax is not known.  Symptoms of impacted earwax  Excess earwax usually does not cause any symptoms, unless there is a large amount of buildup.  Then it may cause symptoms such as:    Hearing loss    Earache    Sense of ear fullness    Itching in the ear    Dizziness    Ringing in the ears    Cough  Treatment for impacted earwax  If you don t have symptoms, you may not need treatment. Often the earwax goes away on its own with time. If you have symptoms, you may have 1 or more treatments such as:    Ear drops. These help to soften the earwax. This helps it leave the ear over time.    Rinsing (irrigation) of the ear canal with water. This is done in a doctor s office.    Removal of the earwax with small tools. This is also done in a doctor s office.  In rare cases, some treatments for earwax removal may cause complications such as:    Swimmer s ear (otitis external)    Earache    Short-term hearing loss    Dizziness    Water trapped in the ear canal    Hole in the eardrum    Ringing in the ears    Bleeding from the ear  Talk with your health care provider about which risks apply most to you.  Don t use these at home  Health care providers do not advise use of ear candles or ear vacuum kits. These methods are not shown to work.   Preventing impacted earwax  You may not be able to prevent impacted earwax if you have a health condition that causes it, such as eczema. In other cases, you may be able to prevent earwax buildup by:    Using ear drops once a week    Having routine cleaning of the ear about every 6 months    Not using cotton swabs in the ear  When to call the health care provider  Call your health care provider right away if you have severe symptoms after earwax removal. These may include bleeding or severe ear pain.   Date Last Reviewed: 3/19/2015    6340-3605 [employer]. [address] [place] [address], [place] [address]. All rights reserved. This information is not intended as a substitute for professional medical care. Always follow your healthcare professional's instructions.            Earwax, Home Treatment    Everyone produces earwax  from the lining of the ear canal. It serves to lubricate and protect the ear. The wax that forms in the canal naturally moves toward the outside of the ear and falls out. Sometimes the ear canal may contain too much wax. This can cause a blockage and loss of hearing. Directions are given below for home treatment.  Home care  If your doctor has advised you to remove a wax blockage yourself, follow these directions:    Unless a medicine was prescribed, you may use an over-the-counter product made for clearing earwax. These contain carbamide peroxide. Lie down with the blocked ear facing upward. Apply one dropper full of medicine and wait a few minutes. Grasp the outer ear and wiggle it to help the solution enter the canal.    Lean over a sink or basin with the blocked ear facing downward. Use a bulb syringe filled with warm (not hot or cold) water to rinse the ear several times. Use gentle pressure only.    If you are having trouble draining the water out of your ear canal, put a few drops of rubbing alcohol (isopropyl alcohol) into the ear canal. This will help remove the remaining water.    Repeat this procedure once a day for up to three days, or until your hearing is back to normal. Do not use this treatment for more than three days in a row.  Don ts    Don t use cold water to rinse the ear. This will make you dizzy.    Don t perform this procedure if you have an ear infection.    Don t perform this procedure if you have a ruptured eardrum.    Don t use cotton swabs, matches, hairpins, keys, or other objects to  clean  the ear canal. This can cause infection of the ear canal or rupture the eardrum. Because of their size and shape, cotton swabs can push earwax deeper into the ear canal instead of removing it.  Follow-up care  Follow up with your health care provider if you are not improving after three cleaning attempts, or as advised.  When to seek medical advice  Call your health care provider right away if any  of these occur:    Worsening ear pain    Fever of 101 F (38.3 C) or higher, or as directed by your health care provider    Hearing does not return to normal after three days of treatment    Fluid drainage or bleeding from the ear canal    Swelling, redness, or tenderness of the outer ear    Headache, neck pain, or stiff neck    2002-1720 [employer]. [address] [place] [address]. All rights reserved. This information is not intended as a substitute for professional medical care. Always follow your healthcare professional's instructions.        Understanding Hearing Loss    As you age, some hearing loss is normal. But long-term exposure to loud noise can speed up the loss. You lose more than the ability to hear how loud a sound is. You also lose the ability to hear certain types of sounds. For example, you might not be able to hear some of the high-pitched sounds of a child's voice.  Normal loss  Each of us is born with about 40,000 hair cells. They thin out naturally as we age. With the loss of hair cells comes hearing loss. This is called presbycusis. Most people don't notice normal hearing loss until their middle years. Others might not notice it until late in their lives. It's most often a slow and painless process.  Accelerated loss  Exposure to loud noise may cause brief hearing loss and ringing in your ears called tinnitus. If your exposure was short, you may recover. But long-term exposure day after day can affect your hearing for life.  Noise hurts more than your hearing  Did you know that loud noises can affect your whole body? Loud noises can:    Raise blood pressure    Disrupt sleep patterns    Cause muscle strain    Harm digestion   Date Last Reviewed: 3/31/2015    0680-3281 [employer]. [address] [place] [address]. All rights reserved. This information is not intended as a substitute for professional medical care. Always follow your healthcare  professional's instructions.

## 2017-10-03 ENCOUNTER — MYC MEDICAL ADVICE (OUTPATIENT)
Dept: FAMILY MEDICINE | Facility: CLINIC | Age: 67
End: 2017-10-03

## 2017-10-05 NOTE — TELEPHONE ENCOUNTER
Spoke with pt, whose insurance is [employer] (Managed Care). Informed pt that [facility] is out of  network. Gave pt the referral information his Provider approved to [facility]iology [facility] - [place] [phone].    Simona Kamara,  Referral Rep

## 2017-11-13 ENCOUNTER — ALLIED HEALTH/NURSE VISIT (OUTPATIENT)
Dept: NURSING | Facility: CLINIC | Age: 67
End: 2017-11-13
Payer: COMMERCIAL

## 2017-11-13 DIAGNOSIS — Z23 NEED FOR PROPHYLACTIC VACCINATION AND INOCULATION AGAINST INFLUENZA: Primary | ICD-10-CM

## 2017-11-13 PROCEDURE — 90471 IMMUNIZATION ADMIN: CPT

## 2017-11-13 PROCEDURE — 99207 ZZC NO CHARGE NURSE ONLY: CPT

## 2017-11-13 PROCEDURE — 90662 IIV NO PRSV INCREASED AG IM: CPT

## 2017-11-13 NOTE — PROGRESS NOTES
Injectable Influenza Immunization Documentation    1.  Is the person to be vaccinated sick today?   No    2. Does the person to be vaccinated have an allergy to a component   of the vaccine?   No  Egg Allergy Algorithm Link    3. Has the person to be vaccinated ever had a serious reaction   to influenza vaccine in the past?   No    4. Has the person to be vaccinated ever had Guillain-Barré syndrome?   No    Prior to injection verified patient identity using patient's name and date of birth.    Per orders of Dr. Olvera, injection of Flu vaccine given by Stevie Grijalva CMA. Patient instructed to remain in clinic for 15 minutes afterwards, and to report any adverse reaction to me immediately.      Form completed by Stevie Grijalva CMA

## 2017-11-13 NOTE — MR AVS SNAPSHOT
After Visit Summary   11/13/2017    Braeden Umana    MRN: [id number]           Patient Information     Date Of Birth          1950        Visit Information        Provider Department      11/13/2017 1:15 PM  FLU CLINIC NURSE [facility]        Today's Diagnoses     Need for prophylactic vaccination and inoculation against influenza    -  1       Follow-ups after your visit        Your next 10 appointments already scheduled     Dec 06, 2017  1:30 PM CST   (Arrive by 1:15 PM)   Hearing Aid Evaluation with [facility] ([facility])    [address] [place] [address]  4th [facility] [address] [phone]              Who to contact     If you have questions or need follow up information about today's clinic visit or your schedule please contact [facility] directly at [phone].  Normal or non-critical lab and imaging results will be communicated to you by MyChart, letter or phone within 4 business days after the clinic has received the results. If you do not hear from us within 7 days, please contact the clinic through Sellerationhart or phone. If you have a critical or abnormal lab result, we will notify you by phone as soon as possible.  Submit refill requests through Phunware or call your pharmacy and they will forward the refill request to us. Please allow 3 business days for your refill to be completed.          Additional Information About Your Visit        MyChart Information     Phunware gives you secure access to your electronic health record. If you see a primary care provider, you can also send messages to your care team and make appointments. If you have questions, please call your primary care clinic.  If you do not have a primary care provider, please call [phone] and they will assist you.        Care EveryWhere ID     This is your Care EveryWhere ID. This could be used by other organizations to  access your [place] medical records  [id number]         Blood Pressure from Last 3 Encounters:   08/28/17 136/84   05/22/17 136/78   03/13/17 133/82    Weight from Last 3 Encounters:   08/28/17 218 lb (98.9 kg)   05/22/17 222 lb (100.7 kg)   03/13/17 224 lb (101.6 kg)              We Performed the Following     FLU VACCINE, INCREASED ANTIGEN, PRESV FREE, AGE 65+ [82717]     Vaccine Administration, Initial [35134]        Primary Care Provider Office Phone # Fax #    Treva ANGELO LINDA Saleem [phone] [address] [facility] [address]        Equal Access to Services     STEVIE BUSTAMANTE : Hadii aad ku hadasho Soyvette, waaxda luqadaha, qaybta kaalmada adeegyada, didi gan . So [facility] [phone].    ATENCIÓN: Si habla español, tiene a guajardo disposición servicios gratuitos de asistencia lingüística. [facility] [phone].    We comply with applicable federal civil rights laws and [place] laws. We do not discriminate on the basis of race, color, national origin, age, disability, sex, sexual orientation, or gender identity.            Thank you!     Thank you for choosing [facility]  for your care. Our goal is always to provide you with excellent care. Hearing back from our patients is one way we can continue to improve our services. Please take a few minutes to complete the written survey that you may receive in the mail after your visit with us. Thank you!             Your Updated Medication List - Protect others around you: Learn how to safely use, store and throw away your medicines at www.disposemymeds.org.          This list is accurate as of: 11/13/17  1:47 PM.  Always use your most recent med list.                   Brand Name Dispense Instructions for use Diagnosis    ALPRAZolam 0.25 MG tablet    XANAX     Take 0.25 mg by mouth 3 times daily as needed.        amLODIPine 10 MG tablet    NORVASC    90 tablet    Take 1 tablet (10 mg) by mouth daily     Hypertension goal BP (blood pressure) < 140/90       aspirin 325 MG tablet      Take  by mouth daily.        atenolol 50 MG tablet    TENORMIN    90 tablet    Take 50 mg by mouth daily    Hypertension goal BP (blood pressure) < 140/90       beclomethasone 40 MCG/ACT Inhaler    QVAR    1 Inhaler    Inhale 2 puffs into the lungs 2 times daily    Cough       BENADRYL 25 MG tablet   Generic drug:  diphenhydrAMINE      Take 25 mg by mouth every 6 hours as needed.        CYMBALTA 60 MG EC capsule   Generic drug:  DULoxetine     0    1 CAPSULE ONCE DAILY        ezetimibe 10 MG tablet    ZETIA    90 tablet    Take 1 tablet (10 mg) by mouth daily    Hyperlipidemia LDL goal <70       NEURONTIN 300 MG capsule   Generic drug:  gabapentin     90 capsule    Take 1 capsule (300 mg) by mouth 2 times daily        nitroGLYcerin 0.4 MG sublingual tablet    NITROSTAT    1 tablet    Place 0.4 mg under the tongue every 5 minutes as needed for chest pain Reported on 5/22/2017        order for DME     1 Units    Equipment being ordered: home blood pressure monitor    Hypertension goal BP (blood pressure) < 140/90       PLAVIX 75 MG tablet   Generic drug:  clopidogrel     3 MONTHS    ONE DAILY        RANEXA 500 MG 12 hr tablet   Generic drug:  ranolazine      Take 500 mg by mouth daily

## 2017-12-12 ENCOUNTER — OFFICE VISIT (OUTPATIENT)
Dept: FAMILY MEDICINE | Facility: CLINIC | Age: 67
End: 2017-12-12
Payer: COMMERCIAL

## 2017-12-12 VITALS
SYSTOLIC BLOOD PRESSURE: 139 MMHG | OXYGEN SATURATION: 97 % | HEIGHT: 66 IN | WEIGHT: 219 LBS | DIASTOLIC BLOOD PRESSURE: 89 MMHG | TEMPERATURE: 97.5 F | RESPIRATION RATE: 18 BRPM | BODY MASS INDEX: 35.2 KG/M2 | HEART RATE: 94 BPM

## 2017-12-12 DIAGNOSIS — R41.3 MEMORY LOSS: ICD-10-CM

## 2017-12-12 DIAGNOSIS — I65.29 STENOSIS OF CAROTID ARTERY, UNSPECIFIED LATERALITY: ICD-10-CM

## 2017-12-12 DIAGNOSIS — H53.2 DIPLOPIA: Primary | ICD-10-CM

## 2017-12-12 DIAGNOSIS — E66.01 MORBID OBESITY (H): ICD-10-CM

## 2017-12-12 DIAGNOSIS — I25.10 ATHEROSCLEROSIS OF CORONARY ARTERY OF NATIVE HEART, ANGINA PRESENCE UNSPECIFIED, UNSPECIFIED VESSEL OR LESION TYPE: ICD-10-CM

## 2017-12-12 DIAGNOSIS — R53.83 FATIGUE, UNSPECIFIED TYPE: ICD-10-CM

## 2017-12-12 DIAGNOSIS — R73.09 ELEVATED GLUCOSE: ICD-10-CM

## 2017-12-12 DIAGNOSIS — Z12.11 SPECIAL SCREENING FOR MALIGNANT NEOPLASMS, COLON: ICD-10-CM

## 2017-12-12 DIAGNOSIS — E55.9 VITAMIN D DEFICIENCY: ICD-10-CM

## 2017-12-12 DIAGNOSIS — F33.0 MILD RECURRENT MAJOR DEPRESSION (H): ICD-10-CM

## 2017-12-12 LAB
BASOPHILS # BLD AUTO: 0.1 10E9/L (ref 0–0.2)
BASOPHILS NFR BLD AUTO: 1.2 %
DIFFERENTIAL METHOD BLD: NORMAL
EOSINOPHIL # BLD AUTO: 0.4 10E9/L (ref 0–0.7)
EOSINOPHIL NFR BLD AUTO: 4.1 %
ERYTHROCYTE [DISTWIDTH] IN BLOOD BY AUTOMATED COUNT: 14.5 % (ref 10–15)
HCT VFR BLD AUTO: 47.1 % (ref 40–53)
HGB BLD-MCNC: 15.6 G/DL (ref 13.3–17.7)
LYMPHOCYTES # BLD AUTO: 1.9 10E9/L (ref 0.8–5.3)
LYMPHOCYTES NFR BLD AUTO: 21 %
MCH RBC QN AUTO: 29.6 PG (ref 26.5–33)
MCHC RBC AUTO-ENTMCNC: 33.1 G/DL (ref 31.5–36.5)
MCV RBC AUTO: 89 FL (ref 78–100)
MONOCYTES # BLD AUTO: 0.8 10E9/L (ref 0–1.3)
MONOCYTES NFR BLD AUTO: 8.8 %
NEUTROPHILS # BLD AUTO: 6 10E9/L (ref 1.6–8.3)
NEUTROPHILS NFR BLD AUTO: 64.9 %
PLATELET # BLD AUTO: 362 10E9/L (ref 150–450)
RBC # BLD AUTO: 5.27 10E12/L (ref 4.4–5.9)
VIT B12 SERPL-MCNC: 479 PG/ML (ref 193–986)
WBC # BLD AUTO: 9.2 10E9/L (ref 4–11)

## 2017-12-12 PROCEDURE — 36415 COLL VENOUS BLD VENIPUNCTURE: CPT | Performed by: NURSE PRACTITIONER

## 2017-12-12 PROCEDURE — 99214 OFFICE O/P EST MOD 30 MIN: CPT | Performed by: NURSE PRACTITIONER

## 2017-12-12 PROCEDURE — 80050 GENERAL HEALTH PANEL: CPT | Performed by: NURSE PRACTITIONER

## 2017-12-12 PROCEDURE — 82306 VITAMIN D 25 HYDROXY: CPT | Performed by: NURSE PRACTITIONER

## 2017-12-12 PROCEDURE — 86618 LYME DISEASE ANTIBODY: CPT | Performed by: NURSE PRACTITIONER

## 2017-12-12 PROCEDURE — 82607 VITAMIN B-12: CPT | Performed by: NURSE PRACTITIONER

## 2017-12-12 PROCEDURE — 83036 HEMOGLOBIN GLYCOSYLATED A1C: CPT | Performed by: NURSE PRACTITIONER

## 2017-12-12 RX ORDER — QUETIAPINE FUMARATE 25 MG/1
25 TABLET, FILM COATED ORAL
COMMUNITY
Start: 2017-12-01 | End: 2019-02-21

## 2017-12-12 ASSESSMENT — PATIENT HEALTH QUESTIONNAIRE - PHQ9: SUM OF ALL RESPONSES TO PHQ QUESTIONS 1-9: 12
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
## 2017-12-12 NOTE — LETTER
My Depression Action Plan  Name: Braeden Umana   Date of Birth 1950  Date: 12/12/2017    My doctor: Treva Saleem   My clinic: [address] [id number] [phone]          GREEN    ZONE   Good Control    What it looks like:     Things are going generally well. You have normal up s and down s. You may even feel depressed from time to time, but bad moods usually last less than a day.   What you need to do:  1. Continue to care for yourself (see self care plan)  2. Check your depression survival kit and update it as needed  3. Follow your physician s recommendations including any medication.  4. Do not stop taking medication unless you consult with your physician first.           YELLOW         ZONE Getting Worse    What it looks like:     Depression is starting to interfere with your life.     It may be hard to get out of bed; you may be starting to isolate yourself from others.    Symptoms of depression are starting to last most all day and this has happened for several days.     You may have suicidal thoughts but they are not constant.   What you need to do:     1. Call your care team, your response to treatment will improve if you keep your care team informed of your progress. Yellow periods are signs an adjustment may need to be made.     2. Continue your self-care, even if you have to fake it!    3. Talk to someone in your support network    4. Open up your depression survival kit           RED    ZONE Medical Alert - Get Help    What it looks like:     Depression is seriously interfering with your life.     You may experience these or other symptoms: You can t get out of bed most days, can t work or engage in other necessary activities, you have trouble taking care of basic hygiene, or basic responsibilities, thoughts of suicide or death that will not go away, self-injurious behavior.     What you need to do:  1. Call your care team  and request a same-day appointment. If they are not available (weekends or after hours) call your local crisis line, emergency room or 911.      Electronically signed by: Minnie Espinoza, December 12, 2017    Depression Self Care Plan / Survival Kit    Self-Care for Depression  Here s the deal. Your body and mind are really not as separate as most people think.  What you do and think affects how you feel and how you feel influences what you do and think. This means if you do things that people who feel good do, it will help you feel better.  Sometimes this is all it takes.  There is also a place for medication and therapy depending on how severe your depression is, so be sure to consult with your medical provider and/ or Behavioral Health Consultant if your symptoms are worsening or not improving.     In order to better manage my stress, I will:    Exercise  Get some form of exercise, every day. This will help reduce pain and release endorphins, the  feel good  chemicals in your brain. This is almost as good as taking antidepressants!  This is not the same as joining a gym and then never going! (they count on that by the way ) It can be as simple as just going for a walk or doing some gardening, anything that will get you moving.      Hygiene   Maintain good hygiene (Get out of bed in the morning, Make your bed, Brush your teeth, Take a shower, and Get dressed like you were going to work, even if you are unemployed).  If your clothes don't fit try to get ones that do.    Diet  I will strive to eat foods that are good for me, drink plenty of water, and avoid excessive sugar, caffeine, alcohol, and other mood-altering substances.  Some foods that are helpful in depression are: complex carbohydrates, B vitamins, flaxseed, fish or fish oil, fresh fruits and vegetables.    Psychotherapy  I agree to participate in Individual Therapy (if recommended).    Medication  If prescribed medications, I agree to take them.  Missing  doses can result in serious side effects.  I understand that drinking alcohol, or other illicit drug use, may cause potential side effects.  I will not stop my medication abruptly without first discussing it with my provider.    Staying Connected With Others  I will stay in touch with my friends, family members, and my primary care provider/team.    Use your imagination  Be creative.  We all have a creative side; it doesn t matter if it s oil painting, sand castles, or mud pies! This will also kick up the endorphins.    Witness Beauty  (AKA stop and smell the roses) Take a look outside, even in mid-winter. Notice colors, textures. Watch the squirrels and birds.     Service to others  Be of service to others.  There is always someone else in need.  By helping others we can  get out of ourselves  and remember the really important things.  This also provides opportunities for practicing all the other parts of the program.    Humor  Laugh and be silly!  Adjust your TV habits for less news and crime-drama and more comedy.    Control your stress  Try breathing deep, massage therapy, biofeedback, and meditation. Find time to relax each day.     My support system    Clinic Contact:  Phone number:    Contact 1:  Phone number:    Contact 2:  Phone number:    Gnosticism/:  Phone number:    Therapist:  Phone number:    Local crisis center:    Phone number:    Other community support:  Phone number:

## 2017-12-12 NOTE — MR AVS SNAPSHOT
After Visit Summary   12/12/2017    Braeden Umana    MRN: [id number]           Patient Information     Date Of Birth          1950        Visit Information        Provider Department      12/12/2017 1:00 PM Treva Saleem NP [facility]        Today's Diagnoses     Diplopia    -  1    Memory loss        Fatigue, unspecified type        Screening for diabetes mellitus        Vitamin D deficiency        Stenosis of carotid artery, unspecified laterality        Atherosclerosis of coronary artery of native heart, angina presence unspecified, unspecified vessel or lesion type           Follow-ups after your visit        Additional Services     OPHTHALMOLOGY ADULT REFERRAL       Your provider has referred you to: [facility]: [facility] [phone]   [email or web address]    Please be aware that coverage of these services is subject to the terms and limitations of your health insurance plan.  Call member services at your health plan with any benefit or coverage questions.      Please bring the following with you to your appointment:    (1) Any X-Rays, CTs or MRIs which have been performed.  Contact the facility where they were done to arrange for  prior to your scheduled appointment.    (2) List of current medications  (3) This referral request   (4) Any documents/labs given to you for this referral                  Your next 10 appointments already scheduled     Dec 26, 2017  2:30 PM CST   (Arrive by 2:15 PM)   Hearing Aid Evaluation with Taina Brooks [facility] ([facility])    [address] [place] [address] [phone]              Future tests that were ordered for you today     Open Future Orders        Priority Expected Expires Ordered    MR Brain w/o Contrast Routine  12/12/2018 12/12/2017    US Carotid Bilateral Routine   "12/12/2018 12/12/2017            Who to contact     If you have questions or need follow up information about today's clinic visit or your schedule please contact [facility] directly at [phone].  Normal or non-critical lab and imaging results will be communicated to you by MyChart, letter or phone within 4 business days after the clinic has received the results. If you do not hear from us within 7 days, please contact the clinic through Neurolinkhart or phone. If you have a critical or abnormal lab result, we will notify you by phone as soon as possible.  Submit refill requests through Imagekind or call your pharmacy and they will forward the refill request to us. Please allow 3 business days for your refill to be completed.          Additional Information About Your Visit        NeurolinkharHelios Information     Imagekind gives you secure access to your electronic health record. If you see a primary care provider, you can also send messages to your care team and make appointments. If you have questions, please call your primary care clinic.  If you do not have a primary care provider, please call [phone] and they will assist you.        Care EveryWhere ID     This is your Care EveryWhere ID. This could be used by other organizations to access your [place] medical records  [id number]        Your Vitals Were     Pulse Temperature Respirations Height Pulse Oximetry BMI (Body Mass Index)    94 97.5  F (36.4  C) (Oral) 18 5' 6.25\" (1.683 m) 97% 35.08 kg/m2       Blood Pressure from Last 3 Encounters:   12/12/17 152/90   08/28/17 136/84   05/22/17 136/78    Weight from Last 3 Encounters:   12/12/17 219 lb (99.3 kg)   08/28/17 218 lb (98.9 kg)   05/22/17 222 lb (100.7 kg)              We Performed the Following     CBC with platelets differential     Comprehensive metabolic panel     DEPRESSION ACTION PLAN (DAP)     Lyme Disease Jazz with reflex to WB Serum     OPHTHALMOLOGY ADULT REFERRAL     TSH with free T4 " reflex     Vitamin B12     Vitamin D Deficiency        Primary Care Provider Office Phone # Fax #    Treva SaleemLINDA [phone] [address] [facility] [place]  [facility] [address]        Equal Access to Services     STEVIE BUSTAMANTE : Hadii lainey butler hadtereo Soomaali, waaxda luqadaha, qaybta kaalmada adeegyada, waxyumi tanjain hayaan neelimaquang zamora elvia antoine. So [facility] [phone].    ATENCIÓN: Si habla español, tiene a guajardo disposición servicios gratuitos de asistencia lingüística. Llame al [phone].    We comply with applicable federal civil rights laws and [place] laws. We do not discriminate on the basis of race, color, national origin, age, disability, sex, sexual orientation, or gender identity.            Thank you!     Thank you for choosing [facility]  for your care. Our goal is always to provide you with excellent care. Hearing back from our patients is one way we can continue to improve our services. Please take a few minutes to complete the written survey that you may receive in the mail after your visit with us. Thank you!             Your Updated Medication List - Protect others around you: Learn how to safely use, store and throw away your medicines at www.disposemymeds.org.          This list is accurate as of: 12/12/17  1:55 PM.  Always use your most recent med list.                   Brand Name Dispense Instructions for use Diagnosis    ALPRAZolam 0.25 MG tablet    XANAX     Take 0.25 mg by mouth 3 times daily as needed.        amLODIPine 10 MG tablet    NORVASC    90 tablet    Take 1 tablet (10 mg) by mouth daily    Hypertension goal BP (blood pressure) < 140/90       aspirin 325 MG tablet      Take  by mouth daily.        atenolol 50 MG tablet    TENORMIN    90 tablet    Take 50 mg by mouth daily    Hypertension goal BP (blood pressure) < 140/90       beclomethasone 40 MCG/ACT Inhaler    QVAR    1 Inhaler    Inhale 2 puffs into the lungs 2 times daily    Cough        BENADRYL 25 MG tablet   Generic drug:  diphenhydrAMINE      Take 25 mg by mouth every 6 hours as needed.        CYMBALTA 60 MG EC capsule   Generic drug:  DULoxetine     0    1 CAPSULE ONCE DAILY        ezetimibe 10 MG tablet    ZETIA    90 tablet    Take 1 tablet (10 mg) by mouth daily    Hyperlipidemia LDL goal <70       NEURONTIN 300 MG capsule   Generic drug:  gabapentin     90 capsule    Take 300 mg by mouth daily Take 3 tablets daily        nitroGLYcerin 0.4 MG sublingual tablet    NITROSTAT    1 tablet    Place 0.4 mg under the tongue every 5 minutes as needed for chest pain Reported on 5/22/2017        PLAVIX 75 MG tablet   Generic drug:  clopidogrel     3 MONTHS    ONE DAILY        QUEtiapine 25 MG tablet    SEROquel     Take 25 mg by mouth Take 1 to 2 tablets by mouth at bedtime    Diplopia, Memory loss, Fatigue, unspecified type

## 2017-12-13 LAB
ALBUMIN SERPL-MCNC: 3.6 G/DL (ref 3.4–5)
ALP SERPL-CCNC: 128 U/L (ref 40–150)
ALT SERPL W P-5'-P-CCNC: 32 U/L (ref 0–70)
ANION GAP SERPL CALCULATED.3IONS-SCNC: 7 MMOL/L (ref 3–14)
AST SERPL W P-5'-P-CCNC: 30 U/L (ref 0–45)
B BURGDOR IGG+IGM SER QL: 0.02 (ref 0–0.89)
BILIRUB SERPL-MCNC: 0.4 MG/DL (ref 0.2–1.3)
BUN SERPL-MCNC: 21 MG/DL (ref 7–30)
CALCIUM SERPL-MCNC: 9.2 MG/DL (ref 8.5–10.1)
CHLORIDE SERPL-SCNC: 105 MMOL/L (ref 94–109)
CO2 SERPL-SCNC: 28 MMOL/L (ref 20–32)
CREAT SERPL-MCNC: 1.09 MG/DL (ref 0.66–1.25)
DEPRECATED CALCIDIOL+CALCIFEROL SERPL-MC: 13 UG/L (ref 20–75)
GFR SERPL CREATININE-BSD FRML MDRD: 67 ML/MIN/1.7M2
GLUCOSE SERPL-MCNC: 117 MG/DL (ref 70–99)
HBA1C MFR BLD: 5.7 % (ref 4.3–6)
POTASSIUM SERPL-SCNC: 4 MMOL/L (ref 3.4–5.3)
PROT SERPL-MCNC: 8 G/DL (ref 6.8–8.8)
SODIUM SERPL-SCNC: 140 MMOL/L (ref 133–144)
TSH SERPL DL<=0.005 MIU/L-ACNC: 1.58 MU/L (ref 0.4–4)

## 2017-12-14 ENCOUNTER — RADIANT APPOINTMENT (OUTPATIENT)
Dept: MRI IMAGING | Facility: CLINIC | Age: 67
End: 2017-12-14
Attending: NURSE PRACTITIONER
Payer: MEDICARE

## 2017-12-14 ENCOUNTER — RADIANT APPOINTMENT (OUTPATIENT)
Dept: ULTRASOUND IMAGING | Facility: CLINIC | Age: 67
End: 2017-12-14
Attending: NURSE PRACTITIONER
Payer: MEDICARE

## 2017-12-14 DIAGNOSIS — R41.3 MEMORY LOSS: ICD-10-CM

## 2017-12-14 DIAGNOSIS — H53.2 DIPLOPIA: ICD-10-CM

## 2017-12-14 DIAGNOSIS — R53.83 FATIGUE, UNSPECIFIED TYPE: ICD-10-CM

## 2017-12-18 ENCOUNTER — TRANSFERRED RECORDS (OUTPATIENT)
Dept: HEALTH INFORMATION MANAGEMENT | Facility: CLINIC | Age: 67
End: 2017-12-18

## 2017-12-26 ENCOUNTER — OFFICE VISIT (OUTPATIENT)
Dept: AUDIOLOGY | Facility: CLINIC | Age: 67
End: 2017-12-26
Payer: MEDICARE

## 2017-12-26 DIAGNOSIS — H90.3 SENSORINEURAL HEARING LOSS (SNHL) OF BOTH EARS: Primary | ICD-10-CM

## 2017-12-26 NOTE — MR AVS SNAPSHOT
After Visit Summary   12/26/2017    Braeden Umana    MRN: [id number]           Patient Information     Date Of Birth          1950        Visit Information        Provider Department      12/26/2017 2:30 PM Taina Brooks AuD M [facility] Audiology        Today's Diagnoses     Sensorineural hearing loss (SNHL) of both ears    -  1       Follow-ups after your visit        Your next 10 appointments already scheduled     Dec 27, 2017  3:00 PM CST   New Visit with Josue Clements MD   [facility] ([facility])    [address] [place] [id number] [phone]              Who to contact     Please call your clinic at [phone] to:    Ask questions about your health    Make or cancel appointments    Discuss your medicines    Learn about your test results    Speak to your doctor   If you have compliments or concerns about an experience at your clinic, or if you wish to file a complaint, please contact [facility] Patient Relations at [phone] or email us at [email or web address]         Additional Information About Your Visit        MyChart Information     [employer] gives you secure access to your electronic health record. If you see a primary care provider, you can also send messages to your care team and make appointments. If you have questions, please call your primary care clinic.  If you do not have a primary care provider, please call [phone] and they will assist you.      [employer] is an electronic gateway that provides easy, online access to your medical records. With [employer], you can request a clinic appointment, read your test results, renew a prescription or communicate with your care team.     To access your existing account, please contact your [facility] or call [phone] for assistance.        Care EveryWhere ID     This is your Care EveryWhere  ID. This could be used by other organizations to access your [facility] records  [id number]         Blood Pressure from Last 3 Encounters:   12/12/17 139/89   08/28/17 136/84   05/22/17 136/78    Weight from Last 3 Encounters:   12/12/17 99.3 kg (219 lb)   08/28/17 98.9 kg (218 lb)   05/22/17 100.7 kg (222 lb)              We Performed the Following     AUDIOGRAM/TYMPANOGRAM - INTERFACE     [facility] Eval & Speech Recog ([id number])        Primary Care Provider Office Phone # Fax #    Treva ANGELO tSiven, [phone] [address] [facility] [address]        Equal Access to Services     STEVIE BUSTAMANTE : Hadii lainey ku hadasho Soomaali, waaxda luqadaha, qaybta kaalmada adeegyada, waxay idiin haytiera gan . So [facility] [phone].    ATENCIÓN: Si habla español, tiene a guajardo disposición servicios gratuitos de asistencia lingüística. Llame al [phone].    We comply with applicable federal civil rights laws and [place] laws. We do not discriminate on the basis of race, color, national origin, age, disability, sex, sexual orientation, or gender identity.            Thank you!     Thank you for choosing [facility] AUDIOLOGY  for your care. Our goal is always to provide you with excellent care. Hearing back from our patients is one way we can continue to improve our services. Please take a few minutes to complete the written survey that you may receive in the mail after your visit with us. Thank you!             Your Updated Medication List - Protect others around you: Learn how to safely use, store and throw away your medicines at www.disposemymeds.org.          This list is accurate as of: 12/26/17 11:59 PM.  Always use your most recent med list.                   Brand Name Dispense Instructions for use Diagnosis    ALPRAZolam 0.25 MG tablet    XANAX     Take 0.25 mg by mouth 3 times daily as needed.        amLODIPine 10 MG tablet    NORVASC    90 tablet    Take 1  tablet (10 mg) by mouth daily    Hypertension goal BP (blood pressure) < 140/90       aspirin 325 MG tablet      Take  by mouth daily.        atenolol 50 MG tablet    TENORMIN    90 tablet    Take 50 mg by mouth daily    Hypertension goal BP (blood pressure) < 140/90       beclomethasone 40 MCG/ACT Inhaler    QVAR    1 Inhaler    Inhale 2 puffs into the lungs 2 times daily    Cough       BENADRYL 25 MG tablet   Generic drug:  diphenhydrAMINE      Take 25 mg by mouth every 6 hours as needed.        cholecalciferol 37384 UNITS capsule    VITAMIN D3    12 capsule    Take 1 capsule (50,000 Units) by mouth once a week for 12 doses    Vitamin D deficiency       CYMBALTA 60 MG EC capsule   Generic drug:  DULoxetine     0    1 CAPSULE ONCE DAILY        ezetimibe 10 MG tablet    ZETIA    90 tablet    Take 1 tablet (10 mg) by mouth daily    Hyperlipidemia LDL goal <70       NEURONTIN 300 MG capsule   Generic drug:  gabapentin     90 capsule    Take 300 mg by mouth daily Take 3 tablets daily        nitroGLYcerin 0.4 MG sublingual tablet    NITROSTAT    1 tablet    Place 0.4 mg under the tongue every 5 minutes as needed for chest pain Reported on 5/22/2017        PLAVIX 75 MG tablet   Generic drug:  clopidogrel     3 MONTHS    ONE DAILY        QUEtiapine 25 MG tablet    SEROquel     Take 25 mg by mouth Take 1 to 2 tablets by mouth at bedtime    Diplopia, Memory loss, Fatigue, unspecified type       STATIN NOT PRESCRIBED (INTENTIONAL)      Please choose reason not prescribed, below    Atherosclerosis of coronary artery of native heart, angina presence unspecified, unspecified vessel or lesion type

## 2017-12-27 ENCOUNTER — OFFICE VISIT (OUTPATIENT)
Dept: NEUROLOGY | Facility: CLINIC | Age: 67
End: 2017-12-27
Payer: COMMERCIAL

## 2017-12-27 ENCOUNTER — MYC MEDICAL ADVICE (OUTPATIENT)
Dept: FAMILY MEDICINE | Facility: CLINIC | Age: 67
End: 2017-12-27

## 2017-12-27 VITALS
DIASTOLIC BLOOD PRESSURE: 73 MMHG | TEMPERATURE: 98.7 F | HEIGHT: 66 IN | RESPIRATION RATE: 16 BRPM | OXYGEN SATURATION: 100 % | HEART RATE: 81 BPM | WEIGHT: 224 LBS | SYSTOLIC BLOOD PRESSURE: 141 MMHG | BODY MASS INDEX: 36 KG/M2

## 2017-12-27 DIAGNOSIS — R41.9 COGNITIVE COMPLAINTS: Primary | ICD-10-CM

## 2017-12-27 DIAGNOSIS — R90.89 ABNORMAL BRAIN MRI: ICD-10-CM

## 2017-12-27 DIAGNOSIS — I65.23 BILATERAL CAROTID ARTERY STENOSIS: ICD-10-CM

## 2017-12-27 PROCEDURE — 99244 OFF/OP CNSLTJ NEW/EST MOD 40: CPT | Performed by: PSYCHIATRY & NEUROLOGY

## 2017-12-27 ASSESSMENT — MONTREAL COGNITIVE ASSESSMENT (MOCA)
9. REPEAT EACH SENTENCE: 1
VISUOSPATIAL/EXECUTIVE SUBSCORE: 5
11. FOR EACH PAIR OF WORDS, WHAT CATEGORY DO THEY BELONG TO (OUT OF 2): 2
6. READ LIST OF DIGITS [FORWARD/BACKWARD]: 2
8. SERIAL SUBTRACTION OF 7S: 3
4. NAME EACH OF THE THREE ANIMALS SHOWN: 3
13. ORIENTATION SUBSCORE: 5
7. [VIGILENCE] TAP WHEN HEARING DESIGNATED LETTER: 1
10. [FLUENCY] NAME WORDS STARTING WITH DESIGNATED LETTER: 1
WHAT LEVEL OF EDUCATION WAS ATTAINED: 0
WHAT IS THE TOTAL SCORE (OUT OF 30): 23
12. MEMORY INDEX SCORE: 0

## 2017-12-27 NOTE — PROGRESS NOTES
"INITIAL NEUROLOGY CONSULTATION    DATE OF VISIT: 12/27/2017  CLINIC LOCATION: [facility]  MRN: [id number]  PATIENT NAME: Braeden Umana  YOB: 1950    PRIMARY CARE PROVIDER: Treva Saleem NP.     REASON FOR VISIT:   Chief Complaint   Patient presents with     Consult     memory     HISTORY OF PRESENT ILLNESS:                                                    Mr. Braeden Umana is 67 year old right handed male patient with past medical history of sleep apnea, hyperlipidemia, anxiety, depression, complicated migraine, and bilateral carotid artery stenosis, who was seen in consultation today requested by Treva Saleem NP, for memory difficulty.  The patient is accompanied by his wife, who participates in interview and serves as a collateral source of information.    Per patient's report, he had minor memory issues for the last 5 years, but they became more apparent/worse in September 2017.  The patient noticed that he is more forgetful with everyday tasks and feels \"foggy\", tired, and distracted.  In addition, he missed 5 doctor's appointments, though remembered them the night before.  At the same time, he regularly takes his medications without any missed doses.  The symptoms seem to be the worst in the morning.  He did not find any aggravating or alleviating factors.  He talked to his psychiatrist who increased his gabapentin and added Seroquel several weeks ago without noticeable effect.    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  He was recently seen by ophthalmologist, who told him that the patient had it since his childhood, but with age at times it could get decompensated.  The patient feels that new glasses with prisms help to correct this problem.  Otherwise, the patient denies any other focal neurological symptoms.    Per patient's wife perception, she did not notice a significant memory difficulty that " the patient describes.  She states that one year ago they moved from a big house to a small condo that greatly disrupted their usual routines.  They are still sorting out their belongings and at times feel exhausted.  Wife did not notice that the patient repeats the same stories or asks the same questions.  She states that he frequently misplaces his personal items, but it was his baseline for the last 40 years.  She acknowledges that he missed 5 doctor's visits, but at the same time points out that he is able to remember important dates, birthdays, and anniversaries without difficulty.  She thinks that he forgot about these appointments because he was not using his usual calendar.    Patient's wife does not recall that the patient ever got lost.  She does not have any safety concerns, including patient's driving.  There are no recent tickets or accidents.  She did not notice any personality or mood changes.    Recent laboratory evaluation includes normal CBC, CMP, A1C (5.7), TSH of 1.58, and B12 of 479.  Lyme testing was negative.    Recent brain imaging includes MRI of the brain without contrast on 12/14/2017 (performed for diplopia and memory loss).  It demonstrated nonspecific scattered T2 hyperintensities in subcortical white matter, felt to likely represent the sequelae of chronic small vessel ischemic disease.  There is a single focus of prior microhemorrhage in the right frontal subcortical white matter, likely sequela of chronic small vessel ischemic disease.  Disconjugate gaze is also noted.  Bilateral carotid ultrasound from 12/14/2017 demonstrated hemodynamically significant stenosis of the internal carotid and external carotid arteries bilaterally.  On the right side it was more than 70%, on the left side it is greater than 80%.  Additionally, hemodynamically significant stenosis at the origin of left subclavian artery was also noted.  Vascular surgery consultation was recommended.    Previous  brain/vessel evaluation includes brain MRI/MRA on 04/03/2011 (performed for visual disturbance).  No evidence of acute infarction, intracranial mass lesions, hemorrhage were seen.  A few nonspecific signal abnormalities in subcortical white matter consistent with small vessel ischemic disease were seen.  90% plus stenosis/subtotal occlusion of the proximal right internal carotid artery with antegrade trickle flow up to the right carotid siphon was seen.  50% stenosis proximal left internal carotid artery with vessel tortuosity was seen.  High-grade long segment stenosis in the proximal left vertebral artery of approximately 75% was seen.  He had additional studies in 2010 (cerebral CTA) and 2006 (MRA of neck).    The patient denies a history of recent head injury. Prior neurological history: negative for brain neoplasms, seizure disorders, multiple sclerosis, and major head injuries.    Neurologic Review of Systems - no amaurosis, abnormal speech, unilateral numbness or weakness. He endorses insomnia, fatigue, angina, food allergies, heartburn, diplopia, anxiety, depression, and bipolar disorder.  These problems have been already discussed with other medical providers.  Otherwise, he denies any other complaints on 14-point comprehensive review of systems.    PAST MEDICAL/SURGICAL HISTORY:                                                    I personally reviewed patient's past medical and surgical history with the patient at today's visit.  Past Medical History:   Diagnosis Date     Angioedema 8/10     Coronary atherosclerosis of unspecified type of vessel, native or graft      Depressive disorder, not elsewhere classified      Hypertrophy (benign) of prostate      Idiopathic urticaria      MI (myocardial infarction) 1/15     Other anxiety states      Pure hypercholesterolemia      Unspecified sleep apnea      Past Surgical History:   Procedure Laterality Date     SURGICAL HISTORY OF -       sinus surgery     SURGICAL  HISTORY OF -       angiogram and stent placement     SURGICAL HISTORY OF -       orchioplexy     SURGICAL HISTORY OF -   12/31/12    angiogram and 4 stents     SURGICAL HISTORY OF -   2/26/13    angiogram and stent     SURGICAL HISTORY OF -   1/15    angiogram and stents     SURGICAL HISTORY OF -   5/15    angiogram and stent     MEDICATIONS:                                                    I personally reviewed patient's medications and allergies with the patient at today's visit.  Current Outpatient Prescriptions on File Prior to Visit:  cholecalciferol (VITAMIN D3) 92393 UNITS capsule Take 1 capsule (50,000 Units) by mouth once a week for 12 doses   QUEtiapine (SEROQUEL) 25 MG tablet Take 25 mg by mouth Take 1 to 2 tablets by mouth at bedtime   ezetimibe (ZETIA) 10 MG tablet Take 1 tablet (10 mg) by mouth daily   amLODIPine (NORVASC) 10 MG tablet Take 1 tablet (10 mg) by mouth daily   atenolol (TENORMIN) 50 MG tablet Take 50 mg by mouth daily    gabapentin (NEURONTIN) 300 MG capsule Take 300 mg by mouth daily Take 3 tablets daily   ALPRAZolam (XANAX) 0.25 MG tablet Take 0.25 mg by mouth 3 times daily as needed.   aspirin 325 MG tablet Take  by mouth daily.   diphenhydrAMINE (BENADRYL) 25 MG tablet Take 25 mg by mouth every 6 hours as needed.   nitroglycerin (NITROSTAT) 0.4 MG SL tablet Place 0.4 mg under the tongue every 5 minutes as needed for chest pain Reported on 5/22/2017   CYMBALTA 60 MG OR CPEP 1 CAPSULE ONCE DAILY   PLAVIX 75 MG OR TABS ONE DAILY   STATIN NOT PRESCRIBED, INTENTIONAL, Please choose reason not prescribed, below (Patient not taking: Reported on 12/27/2017)   beclomethasone (QVAR) 40 MCG/ACT Inhaler Inhale 2 puffs into the lungs 2 times daily (Patient not taking: Reported on 12/12/2017)     ALLERGIES:                                                      Allergies   Allergen Reactions     Contrast Dye Other (See Comments)     IV contrast dye-total cardiac arrest     Apple Hives      "angioedema     Cashews [Nuts] Hives     Angio edema     Citric Acid      Codeine      sensitive     Coreg [Carvedilol] Difficulty breathing     Keflex [Cephalexin Monohydrate] Swelling     Angioedema - possibly due to Keflex     [place] Oil Hives     angioedema     Shrimp Hives     angioedema     Simvastatin      Zocor      FAMILY/SOCIAL HISTORY:                                                    Family and social history was reviewed with the patient at today's visit.  Family history is positive for Parkinson's disease.   Problem (# of Occurrences) Relation (Name,Age of Onset)    Allergies (1) Brother    C.A.D. (3) Mother: bypass, Father: late 50's, Brother: x2/CAD angioplasty age 54    Hypertension (2) Mother, Father    Neurologic Disorder (1) Father: parkinsons       Negative family history of: DIABETES, CEREBROVASCULAR DISEASE, Cancer - colorectal, Prostate Cancer        , lives with his wife.  Former smoker, quit in 1972.  Consumes 1-2 alcoholic drinks per month.  Denies use of recreational drugs.  Works part-time as personal caregiver.  Social History   Substance Use Topics     Smoking status: Former Smoker     Smokeless tobacco: Never Used      Comment: x30 years+     Alcohol use No     REVIEW OF SYSTEMS:                                                    Patient has completed a [facility] Patient Health History, including a 14-system review which was personally reviewed, and pertinent positives are listed in HPI. He denies any additional problems on the further questioning.    EXAM:                                                    VITAL SIGNS:   /75 (BP Location: Left arm, Patient Position: Sitting, Cuff Size: Adult Large)  Pulse 81  Temp 98.7  F (37.1  C) (Oral)  Resp 16  Ht 1.683 m (5' 6.25\")  Wt 101.6 kg (224 lb)  SpO2 100%  BMI 35.88 kg/m2     Repeat blood pressure:  /73 (BP Location: Left arm, Patient Position: Sitting, Cuff Size: Adult Large)  Pulse 81  Temp 98.7 " " F (37.1  C) (Oral)  Resp 16  Ht 1.683 m (5' 6.25\")  Wt 101.6 kg (224 lb)  SpO2 100%  BMI 35.88 kg/m2    Guillaume Cognitive Assessment:    Visuospatial/Executive : 5  Naming: 3  Attention - Digits: 2  Attention - Letters: 1  Attention - Subtraction: 3  Language - Repeat: 1  Language - Fluency : 1  Abstraction: 2  Delayed Recall: 0  Orientation: 5  Education: 0  Guillaume Cognitive Assessment Score:  23/30.     General: pt is in NAD, cooperative.  Skin: normal turgor, moist mucous membranes, no lesions/rashes noticed.  HEENT: ATNC, EOMI, PERRL, white sclera, normal conjunctiva, no nystagmus or ptosis. No carotid bruits bilaterally.  Respiratory: lung sounds clear to auscultation bilaterally, no crackles, wheezes, rhonchi. Symmetric lung excursion, no accessory respiratory muscle use.  Cardiovascular: normal S1/S2, no murmurs/rubs/gallops.   Abdomen: Not distended.   : deferred.    Neurological:  Mental: alert, follows commands, MoCA is 23/30 with 0/5 on memory recall, no aphasia or dysarthria. Fund of knowledge is mildly diminished for age.  Cranial Nerves:  CN II: visual acuity - able to accurately count fingers with each eye. Visual fields intact, fundi: discs sharp, no papilledema and normal vessels bilaterally.  CN III, IV, VI: EOM intact, pupils equal and reactive  CN V: facial sensation nl  CN VII: face symmetric, no facial droop  CN VIII: hearing normal  CN IX: palate elevation symmetric, uvula at midline  CN XI SCM normal, shoulder shrug nl  CN XII: tongue midline  Motor: Strength:5/5 in all major groups of all extremities. Normal tone. No abnormal movements. No pronator drift b/l.  Reflexes: Triceps, biceps, brachioradialis, and patellar reflexes normal and symmetric, achilles reflexes: left is absent, right is diminished.  No clonus noted. Toes are down-going b/l.   Sensory: temperature, light touch, pinprick, and vibration intact. Romberg: negative.  Coordination: FNF and heel-shin tests intact b/l. " No dysdiadochokinesia with rapid alternating movements.  Gait:  Normal, able to stand on his toes and heels, has mild to moderate difficulty with tandem walk.    DATA:     LABS: I personally reviewed the following labs:  Office Visit on 12/12/2017   Component Date Value Ref Range Status     Sodium 12/12/2017 140  133 - 144 mmol/L Final     Potassium 12/12/2017 4.0  3.4 - 5.3 mmol/L Final     Chloride 12/12/2017 105  94 - 109 mmol/L Final     Carbon Dioxide 12/12/2017 28  20 - 32 mmol/L Final     Anion Gap 12/12/2017 7  3 - 14 mmol/L Final     Glucose 12/12/2017 117* 70 - 99 mg/dL Final    Non Fasting     Urea Nitrogen 12/12/2017 21  7 - 30 mg/dL Final     Creatinine 12/12/2017 1.09  0.66 - 1.25 mg/dL Final     GFR Estimate 12/12/2017 67  >60 mL/min/1.7m2 Final    Non  GFR Calc     GFR Estimate If Black 12/12/2017 81  >60 mL/min/1.7m2 Final    African American GFR Calc     Calcium 12/12/2017 9.2  8.5 - 10.1 mg/dL Final     Bilirubin Total 12/12/2017 0.4  0.2 - 1.3 mg/dL Final     Albumin 12/12/2017 3.6  3.4 - 5.0 g/dL Final     Protein Total 12/12/2017 8.0  6.8 - 8.8 g/dL Final     Alkaline Phosphatase 12/12/2017 128  40 - 150 U/L Final     ALT 12/12/2017 32  0 - 70 U/L Final     AST 12/12/2017 30  0 - 45 U/L Final     WBC 12/12/2017 9.2  4.0 - 11.0 10e9/L Final     RBC Count 12/12/2017 5.27  4.4 - 5.9 10e12/L Final     Hemoglobin 12/12/2017 15.6  13.3 - 17.7 g/dL Final     Hematocrit 12/12/2017 47.1  40.0 - 53.0 % Final     MCV 12/12/2017 89  78 - 100 fl Final     MCH 12/12/2017 29.6  26.5 - 33.0 pg Final     MCHC 12/12/2017 33.1  31.5 - 36.5 g/dL Final     RDW 12/12/2017 14.5  10.0 - 15.0 % Final     Platelet Count 12/12/2017 362  150 - 450 10e9/L Final     Diff Method 12/12/2017 Automated Method   Final     % Neutrophils 12/12/2017 64.9  % Final     % Lymphocytes 12/12/2017 21.0  % Final     % Monocytes 12/12/2017 8.8  % Final     % Eosinophils 12/12/2017 4.1  % Final     % Basophils 12/12/2017 1.2   % Final     Absolute Neutrophil 12/12/2017 6.0  1.6 - 8.3 10e9/L Final     Absolute Lymphocytes 12/12/2017 1.9  0.8 - 5.3 10e9/L Final     Absolute Monocytes 12/12/2017 0.8  0.0 - 1.3 10e9/L Final     Absolute Eosinophils 12/12/2017 0.4  0.0 - 0.7 10e9/L Final     Absolute Basophils 12/12/2017 0.1  0.0 - 0.2 10e9/L Final     Lyme Disease Antibodies Serum 12/12/2017 0.02  0.00 - 0.89 Final    Comment: Negative, Absence of detectable Borrelia burdorferi antibodies. A negative   result does not exclude the possibility of Borrelia burgdorferi infection. If   early Lyme disease is suspected, a second sample should be collected and   tested 2 to 4 weeks later.       TSH 12/12/2017 1.58  0.40 - 4.00 mU/L Final     Vitamin B12 12/12/2017 479  193 - 986 pg/mL Final     Vitamin D Deficiency screening 12/12/2017 13* 20 - 75 ug/L Final    Comment: Season, race, dietary intake, and treatment affect the concentration of   25-hydroxy-Vitamin D. Values may decrease during winter months and increase   during summer months. Values 20-29 ug/L may indicate Vitamin D insufficiency   and values <20 ug/L may indicate Vitamin D deficiency.  Vitamin D determination is routinely performed by an immunoassay specific for   25 hydroxyvitamin D3.  If an individual is on vitamin D2 (ergocalciferol)   supplementation, please specify 25 OH vitamin D2 and D3 level determination by   LCMSMS test [id number].       Hemoglobin A1C 12/12/2017 5.7  4.3 - 6.0 % Final     IMAGING/OTHER STUDIES: I reviewed previous medical records, including personal review of MRI images and Care Everywhere, as detailed in the history of present illness.    ASSESSMENT and PLAN:      ASSESSMENT: Braeden Umana is a 67 year old male patient with past medical history of sleep apnea, hyperlipidemia, anxiety, depression, complicated migraine, and bilateral hemodynamically significant carotid artery stenosis, who presents with gradual onset of memory difficulty 5 years ago that  worsened since September 2017.    As we thoroughly discussed with the patient and his wife, his neurological exam today is notable for mild cognitive dysfunction, predominantly memory (based on screening cognitive testing), without additional focal neurological findings.  We reviewed brain MRI images together.  It demonstrated mild nonspecific white matter changes that are likely related to small vessel ischemic disease and old microhemorrhages that could be explained by not optimally controlled hypertension.  We discussed that these findings would not explain his memory difficulty.  We reviewed the results of his recent lab work that was negative for reversible causes of cognitive dysfunction.  I ordered neuropsychological evaluation and CPT to complete the workup.    We also briefly discussed his bilateral carotid artery stenosis, that requires prompt vascular intervention.  The patient has scheduled appointment with vascular surgeon on January 4th of 2018 that I encouraged the patient to keep.  He is also on dual antiplatelet agents that I recommended to continue.    The rest of our detailed discussion and the plan are summarized below.    DIAGNOSES:    ICD-10-CM    1. Cognitive complaints R41.9 NEUROPSYCHOLOGY REFERRAL     OCCUPATIONAL THERAPY REFERRAL   2. Abnormal brain MRI R90.89    3. Bilateral carotid artery stenosis I65.23      PLAN: At today's visit we thoroughly discussed various diagnostic possibilities for patient's symptoms and the reasons for work-up, which includes:  Orders Placed This Encounter   Procedures     NEUROPSYCHOLOGY REFERRAL     OCCUPATIONAL THERAPY REFERRAL     I also advised the patient to follow-up on his significant bilateral carotid artery stenosis with vascular surgeon as planned.    No new medications were ordered.    In addition, patient blood pressure was elevated during today's visit.  I instructed the patient to continue to work with his primary care provider to improve the  blood pressure control.    Preventive Neurology: Encouraged to stay physically and mentally active with particular emphasis on daily mentally stimulating activities of patient's choice (such as crosswords, puzzles, sudoku, etc.), stretching exercises, walking, and healthy eating.    Additional recommendations after the work-up.    Next follow-up appointment is in the next 3 months or earlier if needed.    I advised the patient to call me with any questions or concerns.    Total Time: 63 minutes with > 50% spent counseling the patient and his wife on stated above assessment and recommendations, including nature of the diagnosis, needed w/u, proposed plan, and prognosis.  Additional time was used to answer their questions.    Josue Clements MD  / Neurology  [facility]  (Chart documentation was completed in part with Dragon voice-recognition software. Even though reviewed, some grammatical, spelling, and word errors may remain.)

## 2017-12-27 NOTE — PATIENT INSTRUCTIONS
AFTER VISIT SUMMARY (AVS):    At today's visit we discussed various diagnostic possibilities for your symptoms and the reasons for work-up, which includes:  Orders Placed This Encounter   Procedures     NEUROPSYCHOLOGY REFERRAL     OCCUPATIONAL THERAPY REFERRAL     I also advised you to follow-up on your bilateral carotid artery stenosis with vascular surgeon as planned.    No new medications were ordered.    In addition, your blood pressure was elevated during today's visit.  Please continue to work with your primary care provider to improve your blood pressure control.    Preventive Neurology: Encouraged to stay physically and mentally active with particular emphasis on daily mentally stimulating activities of your choice (such as crosswords, puzzles, sudoku, etc.), stretching exercises, walking, and healthy eating.    Additional recommendations after the work-up.    Next follow-up appointment is in the next 3 months or earlier if needed.    Please do not hesitate to call me with any questions or concerns.    Thanks.

## 2017-12-27 NOTE — MR AVS SNAPSHOT
After Visit Summary   12/27/2017    Braeden Umana    MRN: [id number]           Patient Information     Date Of Birth          1950        Visit Information        Provider Department      12/27/2017 3:00 PM Josue Clements MD [facility] [place] [facility] [place]        Today's Diagnoses     Cognitive complaints    -  1    Abnormal brain MRI        Bilateral carotid artery stenosis          Care Instructions    AFTER VISIT SUMMARY (AVS):    At today's visit we discussed various diagnostic possibilities for your symptoms and the reasons for work-up, which includes:  Orders Placed This Encounter   Procedures     NEUROPSYCHOLOGY REFERRAL     OCCUPATIONAL THERAPY REFERRAL     I also advised you to follow-up on your bilateral carotid artery stenosis with vascular surgeon as planned.    No new medications were ordered.    In addition, your blood pressure was elevated during today's visit.  Please continue to work with your primary care provider to improve your blood pressure control.    Preventive Neurology: Encouraged to stay physically and mentally active with particular emphasis on daily mentally stimulating activities of your choice (such as crosswords, puzzles, sudoku, etc.), stretching exercises, walking, and healthy eating.    Additional recommendations after the work-up.    Next follow-up appointment is in the next 3 months or earlier if needed.    Please do not hesitate to call me with any questions or concerns.    Thanks.            Follow-ups after your visit        Additional Services     NEUROPSYCHOLOGY REFERRAL       Your provider has referred you to:    [facility]: Adult Neuropsychology Clinic [facility] [phone] Preferred Provider: No preferred provider   [email or web address]    All scheduling is subject to the client's specific insurance plan & benefits, provider/location availability, and provider clinical specialities.  Please arrive 15  minutes early for your first appointment and bring your completed paperwork.    Please be aware that coverage of these services is subject to the terms and limitations of your health insurance plan.  Call member services at your health plan with any benefit or coverage questions.    Please bring the following to your appointment:  >>   Any x-rays, CTs or MRIs which have been performed.  Contact the facility where they were done to arrange for  prior to your scheduled appointment.  Any new CT, MRI or other procedures ordered by your specialist must be performed at a [facility] facility or coordinated by your clinic's referral office.    >>   List of current medications   >>   This referral request   >>   Any documents/labs given to you for this referral            OCCUPATIONAL THERAPY REFERRAL       *This order will print in the [facility] Central Scheduling Office*    [facility] provides Occupational Therapy evaluation and treatment and many specialty services across the [facility] system.  If requesting a specialty program, please choose from the list below.    Call [phone] to schedule [facility] at all locations, with the exception of [facility], please call [phone].     Treatment: Evaluation & Treatment  Special Instructions/Modalities: CPT, eval and treat.  Special Programs: Cognition Assessment     Please be aware that coverage of these services is subject to the terms and limitations of your health insurance plan.  Call member services at your health plan with any benefit or coverage questions.      **Note to Provider** To refer patients to therapy outside of the location list, change the order class to External Referral in the order composer.                  Your next 10 appointments already scheduled     Mar 28, 2018  2:30 PM CDT   Return Visit with Josue Clements MD   [facility]  [facility]    [address] [place] [id number] [phone]              Who to contact     If you have questions or need follow up information about today's clinic visit or your schedule please contact [facility] directly at [phone].  Normal or non-critical lab and imaging results will be communicated to you by MyChart, letter or phone within 4 business days after the clinic has received the results. If you do not hear from us within 7 days, please contact the clinic through MyChart or phone. If you have a critical or abnormal lab result, we will notify you by phone as soon as possible.  Submit refill requests through [employer] or call your pharmacy and they will forward the refill request to us. Please allow 3 business days for your refill to be completed.          Additional Information About Your Visit        MyChart Information     [employer] gives you secure access to your electronic health record. If you see a primary care provider, you can also send messages to your care team and make appointments. If you have questions, please call your primary care clinic.  If you do not have a primary care provider, please call [phone] and they will assist you.        Care EveryWhere ID     This is your Care EveryWhere ID. This could be used by other organizations to access your [facility] medical records  [id number]        Your Vitals Were     Pulse Temperature Respirations Height Pulse Oximetry BMI (Body Mass Index)    81 98.7  F (37.1  C) (Oral) 16 1.683 m (5' 6.25\") 100% 35.88 kg/m2       Blood Pressure from Last 3 Encounters:   12/27/17 141/73   12/12/17 139/89   08/28/17 136/84    Weight from Last 3 Encounters:   12/27/17 101.6 kg (224 lb)   12/12/17 99.3 kg (219 lb)   08/28/17 98.9 kg (218 lb)              We Performed the Following     NEUROPSYCHOLOGY REFERRAL     OCCUPATIONAL THERAPY REFERRAL        Primary Care Provider Office Phone # Fax #    Treva Saleem NP " [phone]       [address] [place]  [facility] [address]        Equal Access to Services     STEVIE BUSTAMANTE : Hadii lainey butler rica Gan, wakareemda luqbarry, qaveronicata kaalmada yudelka, didi antoine. So [facility] [phone].    ATENCIÓN: Si habla español, tiene a guajardo disposición servicios gratuitos de asistencia lingüística. Llame al [phone].    We comply with applicable federal civil rights laws and [place] laws. We do not discriminate on the basis of race, color, national origin, age, disability, sex, sexual orientation, or gender identity.            Thank you!     Thank you for choosing [facility]  for your care. Our goal is always to provide you with excellent care. Hearing back from our patients is one way we can continue to improve our services. Please take a few minutes to complete the written survey that you may receive in the mail after your visit with us. Thank you!             Your Updated Medication List - Protect others around you: Learn how to safely use, store and throw away your medicines at www.disposemymeds.org.          This list is accurate as of: 12/27/17  4:02 PM.  Always use your most recent med list.                   Brand Name Dispense Instructions for use Diagnosis    ALPRAZolam 0.25 MG tablet    XANAX     Take 0.25 mg by mouth 3 times daily as needed.        amLODIPine 10 MG tablet    NORVASC    90 tablet    Take 1 tablet (10 mg) by mouth daily    Hypertension goal BP (blood pressure) < 140/90       aspirin 325 MG tablet      Take  by mouth daily.        atenolol 50 MG tablet    TENORMIN    90 tablet    Take 50 mg by mouth daily    Hypertension goal BP (blood pressure) < 140/90       beclomethasone 40 MCG/ACT Inhaler    QVAR    1 Inhaler    Inhale 2 puffs into the lungs 2 times daily    Cough       BENADRYL 25 MG tablet   Generic drug:  diphenhydrAMINE      Take 25 mg by mouth every 6 hours as needed.        cholecalciferol 73881  UNITS capsule    VITAMIN D3    12 capsule    Take 1 capsule (50,000 Units) by mouth once a week for 12 doses    Vitamin D deficiency       CYMBALTA 60 MG EC capsule   Generic drug:  DULoxetine     0    1 CAPSULE ONCE DAILY        ezetimibe 10 MG tablet    ZETIA    90 tablet    Take 1 tablet (10 mg) by mouth daily    Hyperlipidemia LDL goal <70       NEURONTIN 300 MG capsule   Generic drug:  gabapentin     90 capsule    Take 300 mg by mouth daily Take 3 tablets daily        nitroGLYcerin 0.4 MG sublingual tablet    NITROSTAT    1 tablet    Place 0.4 mg under the tongue every 5 minutes as needed for chest pain Reported on 5/22/2017        PLAVIX 75 MG tablet   Generic drug:  clopidogrel     3 MONTHS    ONE DAILY        QUEtiapine 25 MG tablet    SEROquel     Take 25 mg by mouth Take 1 to 2 tablets by mouth at bedtime    Diplopia, Memory loss, Fatigue, unspecified type       STATIN NOT PRESCRIBED (INTENTIONAL)      Please choose reason not prescribed, below    Atherosclerosis of coronary artery of native heart, angina presence unspecified, unspecified vessel or lesion type

## 2017-12-27 NOTE — Clinical Note
Patient's blood pressure was elevated during today's visit.  I advised him to contact you for further management. Thanks, Josue Clements MD.

## 2017-12-27 NOTE — PROGRESS NOTES
AUDIOLOGY REPORT    SUBJECTIVE:  Braeden Umana is a 67 year old male who was seen in Audiology at the [facility]   for audiologic evaluation, referred by Elin Goldman MD. The patient was previously seen at an outside clinic in 2004, and results from that time revealed left normal to moderately-severe hearing loss, and right normal to moderate hearing loss.  The patient reports a decrease in bilateral hearing since early September 2017. He specifically complains of difficulty hearing high pitched sounds. Braeden also complains of ongoing imbalance, which started around the same time of his hearing loss. He denies pain, aural fullness, and tinnitus.     OBJECTIVE:  Otoscopic exam indicates partial obstruction with cerumen bilaterally. Cerumen was removed without incident. A large amount of dry skin was noted in the external ear. We reviewed usage of mineral oil, and it was also suggested that he follow up with his physician for a topical ointment.     Pure Tone Thresholds assessed using conventional audiometry with good  reliability from 250-8000 Hz bilaterally using insert earphones and circumaural headphones     RIGHT:  normal hearing to 2000 Hz sloping to moderately-severe sensorineural hearing loss    LEFT:    normal hearing to 2000 Hz sloping to severe  sensorineural hearing loss    Tympanogram:    RIGHT: Could not seal     LEFT:   Could not seal    Reflexes (reported by stimulus ear): Not tested due to difficulty maintaining a seal    Speech Reception Threshold:    RIGHT: 10 dB HL    LEFT:   10 dB HL  Word Recognition Score:     RIGHT: 100% at 55 dB HL using NU-6 recorded word list.    LEFT:   100% at 55 dB HL using NU-6 recorded word list.      ASSESSMENT:   Compared to patient's previous audiogram from 2004, hearing has decreased significantly at 8000 Hz in both ears, and at 2000 and 6000 Hz in the right ear only.  Today s results were discussed  with the patient in detail.     PLAN:  Patient was counseled regarding hearing loss and impact on communication. The patient is a borderline candidate for hearing aids at this point. It is recommended that the patient return for a hearing aid consult if desired. It is also recommended that he continue to monitor his hearing on an annual basis.   Please call this clinic with questions regarding these results or recommendations.      Tracey Triplett.  Licensed Audiologist  [place] #[id number]

## 2018-01-03 NOTE — TELEPHONE ENCOUNTER
Attempted to call back Kristal from [email or web address], no answer.     I faxed carotid US result to fax number listed below    Minnie Espinoza MA

## 2018-01-03 NOTE — TELEPHONE ENCOUNTER
[facility] states they never received records of the patient's carotid ultrasound from 12/14/2017. States we referred the patient to Dr. Param Pleitez at [facility] .    Per result notes from PCP -  Notes Recorded by Treva Saleem NP on 12/14/2017 at 7:08 PM  Reviewed results with patient.  Follow up with his cardiologist (Benjamin) to get a referral to vascular surgeon within their system.    The nurse from [facility] did not have the patient on the line and the MyChart response was never read by the patient. They did not seem to think an MIRNA was required as we referred the patient?  Please advise. Return call can be made to Kristal at [facility] [employer] [phone]. States the carotid ULS results can be faxed to them at [phone] if so. Thanks!

## 2018-01-04 ENCOUNTER — TRANSFERRED RECORDS (OUTPATIENT)
Dept: HEALTH INFORMATION MANAGEMENT | Facility: CLINIC | Age: 68
End: 2018-01-04

## 2018-01-04 PROCEDURE — 82274 ASSAY TEST FOR BLOOD FECAL: CPT | Performed by: NURSE PRACTITIONER

## 2018-01-05 ENCOUNTER — HOSPITAL ENCOUNTER (OUTPATIENT)
Dept: OCCUPATIONAL THERAPY | Facility: CLINIC | Age: 68
Setting detail: THERAPIES SERIES
End: 2018-01-05
Attending: PSYCHIATRY & NEUROLOGY
Payer: MEDICARE

## 2018-01-05 PROCEDURE — 40000242 ZZH STATISTIC VISIT MEMORY CLINIC: Performed by: OCCUPATIONAL THERAPIST

## 2018-01-05 PROCEDURE — 97165 OT EVAL LOW COMPLEX 30 MIN: CPT | Mod: GO | Performed by: OCCUPATIONAL THERAPIST

## 2018-01-05 PROCEDURE — G9169 MEMORY GOAL STATUS: HCPCS | Mod: GO,CI | Performed by: OCCUPATIONAL THERAPIST

## 2018-01-05 PROCEDURE — G9168 MEMORY CURRENT STATUS: HCPCS | Mod: GO,CI | Performed by: OCCUPATIONAL THERAPIST

## 2018-01-05 PROCEDURE — 96125 COGNITIVE TEST BY HC PRO: CPT | Mod: GO | Performed by: OCCUPATIONAL THERAPIST

## 2018-01-05 PROCEDURE — 97535 SELF CARE MNGMENT TRAINING: CPT | Mod: GO | Performed by: OCCUPATIONAL THERAPIST

## 2018-01-06 DIAGNOSIS — R19.5 POSITIVE FIT (FECAL IMMUNOCHEMICAL TEST): Primary | ICD-10-CM

## 2018-01-06 DIAGNOSIS — Z12.11 SPECIAL SCREENING FOR MALIGNANT NEOPLASMS, COLON: ICD-10-CM

## 2018-01-06 LAB — HEMOCCULT STL QL IA: POSITIVE

## 2018-01-09 NOTE — PROGRESS NOTES
Cognitive Performance Test    SUMMARY OF TEST:    The Cognitive Performance Test (CPT) is a standardized performance-based assessment to measure working memory/executive function processing capacities that underlie functional performance. Subtasks include common basic and instrumental activities of daily living (ADL/IADL) which are rated based on the manner in which patients respond to task demands of varying complexity. The total CPT score describes a level of functioning that indicates how information is processed, implications for functional activities, potential safety risks and a recommended level of supervision or assist based on cognitive function. The highest total score on this test is in the range of 5.6 to 5.8.    DATE OF TESTIN2018    RESULTS OF TESTING:                                                                                         CPT Subtest Results    MEDBOX: . SHOP/GLOVES:  PHONE:    WASH:   TRAVEL:  TOAST:    DRESS:    TOTAL CPT SCORE:  38.5/39     Average CPT Score  5.5/5.6    INTERPRETATION OF TEST RESULTS:    Based on the Cognitive Performance Test, this patient scored at CPT Level 5.5.  See CPT Levels reference below.    Summary of functional cognitive status:     Patient did well with all subtests, only needed one cue to check a medication label but otherwise demonstrated good attention to details, listening and memory. (see score below)      Factors affecting performance:  Emotional Status    Recommendations:    People at this level can live alone, no needed assistance for any IADLs.  Patient will benefit from further OT intervention on education for staying cognitively fit, memory compensation, and fatigue management.                                                TIME ADMINISTERING TEST: 35    TIME FOR INTERPRETATION AND PREPARATION OF REPORT: 10    TOTAL TIME: 45      CPT Levels Reference:    Patient's Average CPT Score:  5.5                              "                                                                                                                     Individual scores range along a continuum as outlined below.  In addition to cognitive status, other factors may affect safety in a home environment.  Please refer to specific recommendations for this patient.    _X__5.6-5.8  Normal functioning (absence of cognitive-functional disability).  Independent in managing personal affairs, monitors and directs own behavior.  Uses complex information to carry out daily activities with safety and accuracy.    Proficient with instrumental activities of daily living (IADL) and learning new activity.  Problems are anticipated, errors are avoided, and consequences of actions are considered.      ___5.0   Mild cognitive-functional disability; deficits in working memory and executive thought processes. Difficulty using complex information. Problems may be observed with recent memory, judgment, reasoning and planning ahead. May be impulsive or have difficulty anticipating consequences.  Safety:  May require assistance to plan ahead; or to manage complex medication schedules, appointments or finances.  Hazardous activities may need to be monitored or limited.  ADL:  Mild functional decline.  Able to complete basic self-care and routine household tasks.  May have difficulty with complex daily tasks such as reading, writing, meal preparation, shopping or driving.   Learns through hands on teaching. Self-centered behavior or difficulty considering the needs of others may be seen related to trouble seeing the  whole picture\". Can appear disorganized or uninhibited.    ___4.5  Mild to moderate cognitive-functional disability. Significant deficits in working memory and executive thought processes. Judgment, reasoning and planning show obvious impairment.  Distractible with inability to shift attention/actions given competing stimuli.  Difficulty with problem solving and " managing details. Complex daily tasks performed with inconsistency, difficulty, or error.     Safety:  Medications should be monitored, stove use may require supervision, and driving ability may be affected.  Impaired safety awareness with inability to anticipate potential problems.  May not recognize or respond to emergent situations. Requires frequent check-in support.   ADL:  Mild difficulty with simple everyday self-care tasks. Benefits from structured, routine activity.  Will likely need reminders to complete tasks outside of the routine. Requires assistance with planning and IADL tasks like shopping and finances. Learns concrete tasks through repetition, but performance may not generalize. Tends to be impulsive with poor insight. Self centered behavior or inability to consider the needs of others is common.    ___4.0  Moderate cognitive-functional disability; abstract to concrete thought processes. Working memory and executive function impairments are obvious. Difficulty with planning and problem solving.  Behavior is goal-directed, but unable to follow multi-step directions, is easily distracted, and may not recognize mistakes.  Inability to anticipate hazards or understand precautions.  Safety:  Recommend 24-hour supervision for safety. Supervision needed for medication management and for hazardous activities. May not be able to follow a restricted diet. Can get lost in unfamiliar surroundings. Generally, persons functioning at level 4 should not be driving.   ADL:  Some decline in quality or frequency of ADL.  Piatt enhanced by use of a routine, simple concrete directions, and caregiver set-up of needed items. Complex tasks such as money or home management typically requires assistance.  Relies heavily on vision to guide behavior; will ignore objects/hazards not in plain sight and can be distracted by irrelevant objects. Often has poor insight.  Able to carry out social conversation and may verbally   cover  for deficits leading caregivers to believe they are capable of functioning independently.       ___3.5  Moderate cognitive-functional disability; increased cues needed for task completion. Aware of concrete task steps but needs prompting or cues to initiate and complete simple tasks. Attention span is limited, simple directions may need to be repeated, and re-focus to a topic or task may be required.  Safety:  24-hour supervision required for safety and for assistance with daily tasks. Assistance required with medications, and access to medication should be limited. Meals, nutrition and dietary restrictions need to be monitored.  All hazardous activities should be restricted or supervised. Should not drive. Prone to wandering and can become lost.  ADL:  Moderate functional decline. Familiar tasks usually requires set-up of supplies and directions to complete steps. May need objects handed to them for task initiation. Function best with a set schedule in familiar surroundings with familiar people. All complex tasks must be done by others. Vocabulary is diminished and speech often unfocused.

## 2018-01-09 NOTE — PROGRESS NOTES
" 01/05/18 1400   Quick Adds   Quick Adds Certification   Type of Visit Initial Outpatient Occupational Therapy Evaluation   General Information   Start Of Care Date 01/05/18   Referring Physician Josue Clements MD   Orders Evaluate and treat as indicated   Orders Date 12/27/17   Medical Diagnosis Cognitive complaints   Onset of Illness/Injury or Date of Surgery 12/27/18   Special Instructions CPT, evaluation and treatment   Surgical/Medical History Reviewed Yes   Additional Occupational Profile Info/Pertinent History of Current Problem Patient referred to outpatient OT for the completion of the Cognitive Performance Test.  Per patient's report, he had minor memory issues for the last 5 years, but they became more apparent/worse in September 2017.  The patient noticed that he is more forgetful with everyday tasks and feels \"foggy\", tired, and distracted.  In addition, he missed 5 doctor's appointments, though remembered them the night before.  Patient very concerned about his memory and wants to be proactive in getting baselines, etc.  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  Hobbies:  active in [employer], written a couple of unpublished novels, does not exercise   Current Living Environment Apartment/condo   Prior Level - Transfers Independent   Prior Level - Ambulation Independent   Prior Level - ADLS Independent   Prior Responsibilities - IADL Meal " Preparation;Housekeeping;Shopping;Medication management;Driving;Work   Current Assistive Devices - Mobility (none)   Patient/family Goals Statement To get a baseline of information of his cognition and to learn strategies to improve his memory.   Pain   Patient currently in pain No   Fall Risk Screen   Have you fallen 2 or more times in the past year? No   Have you fallen and had an injury in the past year? No   Cognitive Status Examination   Orientation Orientation to person, place and time   Level of Consciousness Alert   Follows Commands and Answers Questions 100% of the time;Able to follow multistep instructions   Personal Safety and Judgment Intact   Memory Impaired   Memory Comments MoCA:  25/30 (normal is 26+).  Significant impairments with short term/delayed recall (1 of 5 after a few minutes)   Attention No deficits were identified   Organization/Problem Solving No deficits were identified   Cognitive Comment Full Cognitive Performance Test completed-see seperate note.   Visual Perception   Visual Perception Wears glasses  (bifocals)   Visual Perception Comments Patient reports many issues with his vision, moreso with double vision which comes and goes.  He has a prescription for prism glasses.    Posture   Posture Not impaired   Range of Motion (ROM)   ROM Quick Adds No deficits identified   Strength   Strength No deficits were identified   Coordination   Upper Extremity Coordination No deficits were identified   Balance   Balance Comments Feels off balance a few times, overcorrects himself.     Activity Tolerance   Activity Tolerance Feels tired/fatigued all the time, takes naps daily (2.5-4 hours a day); insomnia at night.   Instrumental Activities of Daily Living Assessment   IADL Assessment/Observations Patient is independent with all ADLs, wife does all the finances and most cooking.   Planned Therapy Interventions   Planned Therapy Interventions Cognitive performance testing;Self care/Home management    Adult OT Eval Goals   OT Eval Goals (Adult) 1;2;3   OT Goal 1   Goal Identifier 1-CPT   Goal Description Patient to verbalize understanding of Cognitive Performance Test results and identify 3 strategies to increase patient's safety and independence in the home setting.   Target Date 01/05/18   Date Met 01/05/18   OT Goal 2   Goal Identifier 2-STM   Goal Description Patient will demonstrate improved memory skills by completing short-term memory tasks in occupational therapy with 85-90% accuracy using compensatory strategies for increased safety and independence with ADL/IADLS (remembering to take medications, turn off the stove when done, etc.).   Target Date 03/06/18   OT Goal 3   Goal Identifier 3-Memory Compensation   Goal Description Patient to utilize memory tools (planner, calendar, etc.) effectively and independently for increased ability to manage her time, remember to take medications, appointments, daily schedule, etc.    Target Date 03/06/18   OT Goal 4   Goal Identifier 4-Fatigue   Goal Description Patient to verbalize 3 strategies for energy conservation/work simplification and fatigue management for improved independence with ADL/IADLs at home and in the community   Target Date 03/06/18   Clinical Impression   Criteria for Skilled Therapeutic Interventions Met Yes, treatment indicated   OT Diagnosis decreased independence with IADLs   Influenced by the following impairments cognitive changes   Assessment of Occupational Performance 1-3 Performance Deficits   Identified Performance Deficits affects ADL/IADL, household/community mobility, WORK, driving, recreational activities   Clinical Decision Making (Complexity) Low complexity   Therapy Frequency 1x/week   Predicted Duration of Therapy Intervention (days/wks) 4   Risks and Benefits of Treatment have been explained. Yes   Patient, Family & other staff in agreement with plan of care Yes   Education Assessment   Barriers To Learning Cognitive    Preferred Learning Style Listening;Reading;Demonstration;Pictures/video   Therapy Certification   Certification date from 01/05/18   Certification date to 03/10/18   Total Evaluation Time   Total Evaluation Time 15

## 2018-01-09 NOTE — PROGRESS NOTES
[facility]          OUTPATIENT OCCUPATIONAL THERAPY  EVALUATION  PLAN OF TREATMENT FOR OUTPATIENT REHABILITATION  (COMPLETE FOR INITIAL CLAIMS ONLY)  Patient's Last Name, First Name, M.I.  YOB: 1950  Braeden Umana                           Provider's Name  [facility] Medical Record No.  [id number]                               Onset Date:     12/27/18   Start of Care Date:     01/05/18   Type:     ___PT   _X_OT   ___SLP Medical Diagnosis:     Cognitive complaints                          OT Diagnosis:     decreased independence with IADLs Visits from SOC:  1   _________________________________________________________________________________  Plan of Treatment/Functional Goals:  Cognitive performance testing, Self care/Home management                    Goals  Goal Identifier: 1-CPT  Goal Description: Patient to verbalize understanding of Cognitive Performance Test results and identify 3 strategies to increase patient's safety and independence in the home setting.  Target Date: 01/05/18  Date Met: 01/05/18  Goal Identifier: 2-STM  Goal Description: Patient will demonstrate improved memory skills by completing short-term memory tasks in occupational therapy with 85-90% accuracy using compensatory strategies for increased safety and independence with ADL/IADLS (remembering to take medications, turn off the stove when done, etc.).  Target Date: 03/06/18     Goal Identifier: 3-Memory Compensation  Goal Description: Patient to utilize memory tools (planner, calendar, etc.) effectively and independently for increased ability to manage her time, remember to take medications, appointments, daily schedule, etc.   Target Date: 03/06/18     Goal Identifier: 4-Fatigue  Goal Description: Patient to verbalize 3 strategies for energy conservation/work simplification and fatigue management  for improved independence with ADL/IADLs at home and in the community  Target Date: 03/06/18                                                          Therapy Frequency: 1x/week     Predicted Duration of Therapy Intervention (days/wks): 4  Merlene Burrell OT          I CERTIFY THE NEED FOR THESE SERVICES FURNISHED UNDER        THIS PLAN OF TREATMENT AND WHILE UNDER MY CARE     (Physician co-signature of this document indicates review and certification of the therapy plan).                   ,    Certification date from: 01/05/18, Certification date to: 03/10/18               Referring Physician: Josue Clements MD     Initial Assessment        See Epic Evaluation      Start Of Care Date: 01/05/18

## 2018-01-11 ENCOUNTER — TRANSFERRED RECORDS (OUTPATIENT)
Dept: HEALTH INFORMATION MANAGEMENT | Facility: CLINIC | Age: 68
End: 2018-01-11

## 2018-01-17 ENCOUNTER — OFFICE VISIT (OUTPATIENT)
Dept: FAMILY MEDICINE | Facility: CLINIC | Age: 68
End: 2018-01-17
Payer: COMMERCIAL

## 2018-01-17 VITALS
HEIGHT: 65 IN | BODY MASS INDEX: 36.92 KG/M2 | SYSTOLIC BLOOD PRESSURE: 139 MMHG | RESPIRATION RATE: 18 BRPM | DIASTOLIC BLOOD PRESSURE: 80 MMHG | WEIGHT: 221.6 LBS | TEMPERATURE: 97.3 F | HEART RATE: 91 BPM

## 2018-01-17 DIAGNOSIS — I10 HYPERTENSION GOAL BP (BLOOD PRESSURE) < 140/90: ICD-10-CM

## 2018-01-17 DIAGNOSIS — K21.9 GASTROESOPHAGEAL REFLUX DISEASE, ESOPHAGITIS PRESENCE NOT SPECIFIED: ICD-10-CM

## 2018-01-17 DIAGNOSIS — I25.10 ATHEROSCLEROSIS OF CORONARY ARTERY OF NATIVE HEART, ANGINA PRESENCE UNSPECIFIED, UNSPECIFIED VESSEL OR LESION TYPE: ICD-10-CM

## 2018-01-17 DIAGNOSIS — I65.29 STENOSIS OF CAROTID ARTERY, UNSPECIFIED LATERALITY: ICD-10-CM

## 2018-01-17 DIAGNOSIS — E66.01 MORBID OBESITY (H): ICD-10-CM

## 2018-01-17 DIAGNOSIS — Z01.818 PREOP GENERAL PHYSICAL EXAM: Primary | ICD-10-CM

## 2018-01-17 DIAGNOSIS — F33.0 MILD RECURRENT MAJOR DEPRESSION (H): ICD-10-CM

## 2018-01-17 DIAGNOSIS — G47.33 OBSTRUCTIVE SLEEP APNEA SYNDROME: ICD-10-CM

## 2018-01-17 PROCEDURE — 99215 OFFICE O/P EST HI 40 MIN: CPT | Performed by: NURSE PRACTITIONER

## 2018-01-17 RX ORDER — ATENOLOL 50 MG/1
75 TABLET ORAL DAILY
Qty: 135 TABLET | Refills: 0 | Status: SHIPPED | OUTPATIENT
Start: 2018-01-17 | End: 2018-02-26

## 2018-01-17 NOTE — PROGRESS NOTES
[address] [address]  [phone]  Dept: [phone]    PRE-OP EVALUATION:  Today's date: 2018    Braeden Umana (: 1950) presents for pre-operative evaluation assessment as requested by Dr. Yuen.  He requires evaluation and anesthesia risk assessment prior to undergoing surgery/procedure for treatment of carotid artery stenosis.  Proposed procedure: Left carotid Endarterectomy    Date of Surgery/ Procedure: 18  Time of Surgery/ Procedure: 6:30 a.m.  Hospital/Surgical Facility: [facility]  Fax #: [phone]  Primary Physician: Treva Saleem  Type of Anesthesia Anticipated: to be determined    Patient has a Health Care Directive or Living Will:  YES     Preop Questions 2018   1.  Do you have a history of heart attack, stroke, stent, bypass or surgery on an artery in the head, neck, heart or legs? YES - Pt states he has a stent and has had a heart attack.   2.  Do you ever have any pain or discomfort in your chest? no   3.  Do you have a history of  Heart Failure? No   4.   Are you troubled by shortness of breath when:  walking on a level surface, or up a slight hill, or at night? No   5.  Do you currently have a cold, bronchitis or other respiratory infection? No   6.  Do you have a cough, shortness of breath, or wheezing? No   7.  Do you sometimes get pains in the calves of your legs when you walk? YES - pt states that sometimes when he walks his has pain in both of his legs.   8. Do you or anyone in your family have previous history of blood clots? No   9.  Do you or does anyone in your family have a serious bleeding problem such as prolonged bleeding following surgeries or cuts? No   10. Have you ever had problems with anemia or been told to take iron pills? No   11. Have you had any abnormal blood loss such as black, tarry or bloody stools? No   12. Have you ever had a blood transfusion? No   13. Have you or any  of your relatives ever had problems with anesthesia? No   14. Do you have sleep apnea, excessive snoring or daytime drowsiness? YES - pt has sleep apnea.   15. Do you have any prosthetic heart valves? No   16. Do you have prosthetic joints? No           HPI:                                                      Brief HPI related to upcoming procedure: Progression of carotid artery stenosis despite medical management.       HYPERTENSION - Patient has longstanding history of mod-severe HTN , currently denies any symptoms referable to elevated blood pressure. Specifically denies chest pain, palpitations, dyspnea, orthopnea, PND or peripheral edema. Blood pressure readings have been in the high-normal range. Current medication regimen is as listed below. Patient denies any side effects of medication.                                                                                                                                                                                          .  DEPRESSION - Patient has a long history of Depression of moderate severity requiring medication for control with recent symptoms being stable.                                                                                                                                                         .  CAD - Patient has a longstanding history of mod-severe CAD. Patient denies recent chest pain or NTG use, denies exercise induced dyspnea or PND. Last Stress test 12/17.                                                                                                                            .  SLEEP APNEA - Patient has a longstanding history of snoring, excessive daytime somnolence and fatigue of moderate severity.                                                                                                                                       .    MEDICAL HISTORY:                                                    Patient Active Problem List     Diagnosis Date Noted     BMI >35 01/17/2018     Priority: Medium     Sleep apnea 01/28/2015     Priority: Medium     Achilles bursitis or tendinitis 07/30/2014     Priority: Medium     Advanced directives, counseling/discussion 03/05/2013     Priority: Medium     Advance Care Planning:     Patient does have a health care directive and will bring in a copy to be scanned in at next visit.                  Hypertension goal BP (blood pressure) < 140/90 03/05/2013     Priority: Medium     Hyperlipidemia LDL goal <70 01/09/2013     Priority: Medium     Unable to tolerate statins       Carotid artery stenosis 01/09/2013     Priority: Medium     80% right external carotid stenosis, 60% right internal carotid stenosis  70% left external carotid stenosis, >40% left internal carotid stenosis       Mild recurrent major depression (H) 08/14/2012     Priority: Medium     Idiopathic angioedema 03/10/2011     Priority: Medium     Esophageal reflux 02/12/2007     Priority: Medium     ANXIETY  03/10/2005     Priority: Medium     Idiopathic urticaria 02/10/2005     Priority: Medium     chronic         Coronary atherosclerosis 10/18/2004     Priority: Medium     5 stents placed 8/03  3 stents placed 4/04  4 stents placed 1/13  3 stent placed 2/13  1 stent placed 8/13  Problem list name updated by automated process. Provider to review       Hypertrophy (benign) of prostate 10/18/2004     Priority: Medium     Problem list name updated by automated process. Provider to review and confirm        Past Medical History:   Diagnosis Date     Angioedema 8/10     Coronary atherosclerosis of unspecified type of vessel, native or graft      Depressive disorder, not elsewhere classified      Hypertrophy (benign) of prostate      Idiopathic urticaria      MI (myocardial infarction) 1/15     Other anxiety states      Pure hypercholesterolemia      Unspecified sleep apnea      Past Surgical History:   Procedure Laterality Date     SURGICAL HISTORY OF  -       sinus surgery     SURGICAL HISTORY OF -       angiogram and stent placement     SURGICAL HISTORY OF -       orchioplexy     SURGICAL HISTORY OF -   12/31/12    angiogram and 4 stents     SURGICAL HISTORY OF -   2/26/13    angiogram and stent     SURGICAL HISTORY OF -   1/15    angiogram and stents     SURGICAL HISTORY OF -   5/15    angiogram and stent     Current Outpatient Prescriptions   Medication Sig Dispense Refill     atenolol (TENORMIN) 50 MG tablet Take 1.5 tablets (75 mg) by mouth daily 135 tablet 0     ranitidine (ZANTAC) 150 MG tablet Take 1 tablet (150 mg) by mouth 2 times daily 60 tablet PRN     cholecalciferol (VITAMIN D3) 05823 UNITS capsule Take 1 capsule (50,000 Units) by mouth once a week for 12 doses 12 capsule 0     QUEtiapine (SEROQUEL) 25 MG tablet Take 25 mg by mouth Take 1 to 2 tablets by mouth at bedtime       STATIN NOT PRESCRIBED, INTENTIONAL, Please choose reason not prescribed, below       ezetimibe (ZETIA) 10 MG tablet Take 1 tablet (10 mg) by mouth daily 90 tablet 0     beclomethasone (QVAR) 40 MCG/ACT Inhaler Inhale 2 puffs into the lungs 2 times daily 1 Inhaler 0     amLODIPine (NORVASC) 10 MG tablet Take 1 tablet (10 mg) by mouth daily 90 tablet 3     gabapentin (NEURONTIN) 300 MG capsule Take 300 mg by mouth daily Take 3 tablets daily 90 capsule      ALPRAZolam (XANAX) 0.25 MG tablet Take 0.25 mg by mouth 3 times daily as needed.       aspirin 325 MG tablet Take  by mouth daily.       diphenhydrAMINE (BENADRYL) 25 MG tablet Take 25 mg by mouth every 6 hours as needed.       nitroglycerin (NITROSTAT) 0.4 MG SL tablet Place 0.4 mg under the tongue every 5 minutes as needed for chest pain Reported on 5/22/2017 1 tablet 0     CYMBALTA 60 MG OR CPEP 1 CAPSULE ONCE DAILY 0 0     PLAVIX 75 MG OR TABS ONE DAILY 3 MONTHS 0     [DISCONTINUED] atenolol (TENORMIN) 50 MG tablet Take 50 mg by mouth daily  90 tablet 0     OTC products: None, except as noted above    Allergies   Allergen  "Reactions     Contrast Dye Other (See Comments)     IV contrast dye-total cardiac arrest     Apple Hives     angioedema     Cashews [Nuts] Hives     Angio edema     Citric Acid      Codeine      sensitive     Coreg [Carvedilol] Difficulty breathing     Keflex [Cephalexin Monohydrate] Swelling     Angioedema - possibly due to Keflex     [place] Oil Hives     angioedema     Shrimp Hives     angioedema     Simvastatin      Zocor       Latex Allergy: NO    Social History   Substance Use Topics     Smoking status: Former Smoker     Smokeless tobacco: Never Used      Comment: x30 years+     Alcohol use No     History   Drug Use No       REVIEW OF SYSTEMS:                                                    C: NEGATIVE for fever, chills, change in weight  I: NEGATIVE for worrisome rashes, moles or lesions  E: NEGATIVE for vision changes or irritation  E/M: NEGATIVE for ear, mouth and throat problems  R: NEGATIVE for significant cough or SOB  B: NEGATIVE for masses, tenderness or discharge  CV: NEGATIVE for chest pain, palpitations or peripheral edema  GI: NEGATIVE for nausea or change in bowel habits; recent mild epigastric discomfort and heartburn  : NEGATIVE for frequency, dysuria, or hematuria  M: NEGATIVE for significant arthralgias or myalgia  N: NEGATIVE for weakness, dizziness or paresthesias  E: NEGATIVE for temperature intolerance, skin/hair changes  H: NEGATIVE for bleeding problems  P: NEGATIVE for changes in mood or affect    EXAM:                                                    /80  Pulse 91  Temp 97.3  F (36.3  C) (Oral)  Resp 18  Ht 5' 5.25\" (1.657 m)  Wt 221 lb 9.6 oz (100.5 kg)  BMI 36.59 kg/m2    GENERAL APPEARANCE: healthy, alert and no distress     EYES: EOMI,  PERRL     HENT: ear canals and TM's normal and nose and mouth without ulcers or lesions     NECK: no adenopathy, no asymmetry, masses, or scars and thyroid normal to palpation     RESP: lungs clear to auscultation - no rales, " rhonchi or wheezes     CV: regular rates and rhythm, normal S1 S2, no S3 or S4 and 2/6 systolic murmur heard best in the RUSB, click or rub     ABDOMEN:  soft, minimal epigastric tenderness, no HSM or masses and bowel sounds normal     MS: extremities normal- no gross deformities noted, no evidence of inflammation in joints, FROM in all extremities.     SKIN: no suspicious lesions or rashes     NEURO: Normal strength and tone, sensory exam grossly normal, mentation intact and speech normal     PSYCH: mentation appears normal. and affect normal/bright     LYMPHATICS: No axillary, cervical, or supraclavicular nodes    DIAGNOSTICS:                                                    Labs done 12/18 through [facility]    Recent Labs   Lab Test  12/12/17   1411 05/27/16 09/04/13   1543   02/18/13   1636   HGB  15.6   --    --   14.5   --   15.3   PLT  362   --    --    --    --   325   INR   --   1.0   --    --    --    --    NA  140  136   < >  141   < >  140   POTASSIUM  4.0  3.9   < >  4.5   < >  4.2   CR  1.09  1.32   < >  1.17   < >  1.00   A1C  5.7   --    --    --    --    --     < > = values in this interval not displayed.        IMPRESSION:                                                    Reason for surgery/procedure: Left carotid Endarterectomy    The proposed surgical procedure is considered HIGH risk.    REVISED CARDIAC RISK INDEX  The patient has the following serious cardiovascular risks for perioperative complications such as (MI, PE, VFib and 3  AV Block):  High risk surgery (>5% cardiac complication risk)  Coronary Artery Disease (MI, positive stress test, angina, Qs on EKG)  INTERPRETATION: 2 risks: Class III (moderate risk - 6.6% complication rate)    The patient has the following additional risks for perioperative complications:  No identified additional risks      ICD-10-CM    1. Preop general physical exam Z01.818    2. Stenosis of carotid artery, unspecified laterality I65.29     3. Atherosclerosis of coronary artery of native heart, angina presence unspecified, unspecified vessel or lesion type I25.10    4. Hypertension goal BP (blood pressure) < 140/90 I10 atenolol (TENORMIN) 50 MG tablet   5. BMI >35 E66.01    6. Obstructive sleep apnea syndrome G47.33    7. Gastroesophageal reflux disease, esophagitis presence not specified K21.9 ranitidine (ZANTAC) 150 MG tablet   8. Mild recurrent major depression (H) F33.0        RECOMMENDATIONS:                                                      CAD - Continue Plavix and ASA per surgeon recommendation  HTN - Will increase Atenolol to 75 mg daily. Monitor blood pressure.   JONNATHAN - Recommend careful positioning to prevent airway compromise and close monitoring of the patient's respiratory status.   GERD - Start Zantac 150 mg BID.     --Patient is to take all scheduled medications on the day of surgery EXCEPT for modifications listed below.    APPROVAL GIVEN to proceed with proposed procedure, without further diagnostic evaluation       Signed Electronically by: Treva Saleem NP    Copy of this evaluation report is provided to requesting physician.    [facility] Preop Guidelines

## 2018-01-17 NOTE — PATIENT INSTRUCTIONS
Increase Atenolol to one and 1/2 tablet daily.  Start Zantac 150 mg twice daily.    Before Your Surgery      Call your surgeon if there is any change in your health. This includes signs of a cold or flu (such as a sore throat, runny nose, cough, rash or fever).    Do not smoke, drink alcohol or take over the counter medicine (unless your surgeon or primary care doctor tells you to) for the 24 hours before and after surgery.    If you take prescribed drugs: Follow your doctor s orders about which medicines to take and which to stop until after surgery.    Eating and drinking prior to surgery: follow the instructions from your surgeon    Take a shower or bath the night before surgery. Use the soap your surgeon gave you to gently clean your skin. If you do not have soap from your surgeon, use your regular soap. Do not shave or scrub the surgery site.  Wear clean pajamas and have clean sheets on your bed.

## 2018-01-17 NOTE — MR AVS SNAPSHOT
After Visit Summary   1/17/2018    Bareden Umana    MRN: [id number]           Patient Information     Date Of Birth          1950        Visit Information        Provider Department      1/17/2018 2:45 PM Treva Saleem NP [facility]        Today's Diagnoses     Preop general physical exam    -  1    Stenosis of carotid artery, unspecified laterality        Hypertension goal BP (blood pressure) < 140/90        Gastroesophageal reflux disease, esophagitis presence not specified          Care Instructions    Increase Atenolol to one and 1/2 tablet daily.  Start Zantac 150 mg twice daily.    Before Your Surgery      Call your surgeon if there is any change in your health. This includes signs of a cold or flu (such as a sore throat, runny nose, cough, rash or fever).    Do not smoke, drink alcohol or take over the counter medicine (unless your surgeon or primary care doctor tells you to) for the 24 hours before and after surgery.    If you take prescribed drugs: Follow your doctor s orders about which medicines to take and which to stop until after surgery.    Eating and drinking prior to surgery: follow the instructions from your surgeon    Take a shower or bath the night before surgery. Use the soap your surgeon gave you to gently clean your skin. If you do not have soap from your surgeon, use your regular soap. Do not shave or scrub the surgery site.  Wear clean pajamas and have clean sheets on your bed.           Follow-ups after your visit        Your next 10 appointments already scheduled     Mar 28, 2018  2:30 PM CDT   Return Visit with Josue Clements MD   [facility] ([facility])    [address] [place] [id number] [phone]              Who to contact     If you have questions or need follow up information about today's clinic visit or your schedule please contact [facility]  "directly at [phone].  Normal or non-critical lab and imaging results will be communicated to you by [employer], letter or phone within 4 business days after the clinic has received the results. If you do not hear from us within 7 days, please contact the clinic through [employer] or phone. If you have a critical or abnormal lab result, we will notify you by phone as soon as possible.  Submit refill requests through [employer] or call your pharmacy and they will forward the refill request to us. Please allow 3 business days for your refill to be completed.          Additional Information About Your Visit        [employer] Information     [employer] gives you secure access to your electronic health record. If you see a primary care provider, you can also send messages to your care team and make appointments. If you have questions, please call your primary care clinic.  If you do not have a primary care provider, please call [phone] and they will assist you.        Care EveryWhere ID     This is your Care EveryWhere ID. This could be used by other organizations to access your [facility] medical records  [id number]        Your Vitals Were     Pulse Temperature Respirations Height BMI (Body Mass Index)       91 97.3  F (36.3  C) (Oral) 18 5' 5.25\" (1.657 m) 36.59 kg/m2        Blood Pressure from Last 3 Encounters:   01/17/18 139/80   12/27/17 141/73   12/12/17 139/89    Weight from Last 3 Encounters:   01/17/18 221 lb 9.6 oz (100.5 kg)   12/27/17 224 lb (101.6 kg)   12/12/17 219 lb (99.3 kg)              Today, you had the following     No orders found for display         Today's Medication Changes          These changes are accurate as of: 1/17/18  3:54 PM.  If you have any questions, ask your nurse or doctor.               Start taking these medicines.        Dose/Directions    ranitidine 150 MG tablet   Commonly known as:  ZANTAC   Used for:  Gastroesophageal reflux disease, esophagitis presence not specified   Started by: "  Treva Saleem NP        Dose:  150 mg   Take 1 tablet (150 mg) by mouth 2 times daily   Quantity:  60 tablet   Refills:  PRN         These medicines have changed or have updated prescriptions.        Dose/Directions    atenolol 50 MG tablet   Commonly known as:  TENORMIN   Indication:  2 daily   This may have changed:  how much to take   Used for:  Hypertension goal BP (blood pressure) < 140/90   Changed by:  Treva Saleem NP        Dose:  75 mg   Take 1.5 tablets (75 mg) by mouth daily   Quantity:  135 tablet   Refills:  0            Where to get your medicines      These medications were sent to [facility] - [address] [address]     Phone:  [phone]     atenolol 50 MG tablet    ranitidine 150 MG tablet                Primary Care Provider Office Phone # Fax #    Treva Saleem [phone]       [address] [facility] [address]        Equal Access to Services     STEVIE BUSTAMANTE AH: Hadii aad ku hadasho Soomaali, waaxda luqadaha, qaybta kaalmada adeegyada, waxay idiin hayaan ambrose antoine. So [facility] [phone].    ATENCIÓN: Si habla español, tiene a guajardo disposición servicios gratuitos de asistencia lingüística. Llame al [phone].    We comply with applicable federal civil rights laws and [place] laws. We do not discriminate on the basis of race, color, national origin, age, disability, sex, sexual orientation, or gender identity.            Thank you!     Thank you for choosing [facility]  for your care. Our goal is always to provide you with excellent care. Hearing back from our patients is one way we can continue to improve our services. Please take a few minutes to complete the written survey that you may receive in the mail after your visit with us. Thank you!             Your Updated Medication List - Protect others around you: Learn how to  safely use, store and throw away your medicines at www.disposemymeds.org.          This list is accurate as of: 1/17/18  3:54 PM.  Always use your most recent med list.                   Brand Name Dispense Instructions for use Diagnosis    ALPRAZolam 0.25 MG tablet    XANAX     Take 0.25 mg by mouth 3 times daily as needed.        amLODIPine 10 MG tablet    NORVASC    90 tablet    Take 1 tablet (10 mg) by mouth daily    Hypertension goal BP (blood pressure) < 140/90       aspirin 325 MG tablet      Take  by mouth daily.        atenolol 50 MG tablet    TENORMIN    135 tablet    Take 1.5 tablets (75 mg) by mouth daily    Hypertension goal BP (blood pressure) < 140/90       beclomethasone 40 MCG/ACT Inhaler    QVAR    1 Inhaler    Inhale 2 puffs into the lungs 2 times daily    Cough       BENADRYL 25 MG tablet   Generic drug:  diphenhydrAMINE      Take 25 mg by mouth every 6 hours as needed.        cholecalciferol 44709 UNITS capsule    VITAMIN D3    12 capsule    Take 1 capsule (50,000 Units) by mouth once a week for 12 doses    Vitamin D deficiency       CYMBALTA 60 MG EC capsule   Generic drug:  DULoxetine     0    1 CAPSULE ONCE DAILY        ezetimibe 10 MG tablet    ZETIA    90 tablet    Take 1 tablet (10 mg) by mouth daily    Hyperlipidemia LDL goal <70       NEURONTIN 300 MG capsule   Generic drug:  gabapentin     90 capsule    Take 300 mg by mouth daily Take 3 tablets daily        nitroGLYcerin 0.4 MG sublingual tablet    NITROSTAT    1 tablet    Place 0.4 mg under the tongue every 5 minutes as needed for chest pain Reported on 5/22/2017        PLAVIX 75 MG tablet   Generic drug:  clopidogrel     3 MONTHS    ONE DAILY        QUEtiapine 25 MG tablet    SEROquel     Take 25 mg by mouth Take 1 to 2 tablets by mouth at bedtime    Diplopia, Memory loss, Fatigue, unspecified type       ranitidine 150 MG tablet    ZANTAC    60 tablet    Take 1 tablet (150 mg) by mouth 2 times daily    Gastroesophageal reflux disease,  esophagitis presence not specified       STATIN NOT PRESCRIBED (INTENTIONAL)      Please choose reason not prescribed, below    Atherosclerosis of coronary artery of native heart, angina presence unspecified, unspecified vessel or lesion type

## 2018-01-23 ENCOUNTER — TRANSFERRED RECORDS (OUTPATIENT)
Dept: HEALTH INFORMATION MANAGEMENT | Facility: CLINIC | Age: 68
End: 2018-01-23

## 2018-01-29 ENCOUNTER — MYC MEDICAL ADVICE (OUTPATIENT)
Dept: FAMILY MEDICINE | Facility: CLINIC | Age: 68
End: 2018-01-29

## 2018-01-29 ENCOUNTER — OFFICE VISIT (OUTPATIENT)
Dept: FAMILY MEDICINE | Facility: CLINIC | Age: 68
End: 2018-01-29
Payer: COMMERCIAL

## 2018-01-29 VITALS
HEART RATE: 102 BPM | TEMPERATURE: 98.5 F | DIASTOLIC BLOOD PRESSURE: 85 MMHG | WEIGHT: 214.5 LBS | OXYGEN SATURATION: 98 % | RESPIRATION RATE: 18 BRPM | SYSTOLIC BLOOD PRESSURE: 132 MMHG | BODY MASS INDEX: 35.42 KG/M2

## 2018-01-29 DIAGNOSIS — Z98.890 S/P CAROTID ENDARTERECTOMY: Primary | ICD-10-CM

## 2018-01-29 DIAGNOSIS — E78.5 HYPERLIPIDEMIA LDL GOAL <70: ICD-10-CM

## 2018-01-29 DIAGNOSIS — I10 HYPERTENSION GOAL BP (BLOOD PRESSURE) < 140/90: ICD-10-CM

## 2018-01-29 PROCEDURE — 99214 OFFICE O/P EST MOD 30 MIN: CPT | Performed by: NURSE PRACTITIONER

## 2018-01-29 RX ORDER — EZETIMIBE 10 MG/1
10 TABLET ORAL DAILY
Qty: 90 TABLET | Status: SHIPPED | OUTPATIENT
Start: 2018-01-29 | End: 2020-01-15

## 2018-01-29 NOTE — PROGRESS NOTES
SUBJECTIVE:   Braeden Umana is a 67 year old male who presents to clinic today for the following health issues:      Hospital Follow-up Visit:    Hospital/Nursing Home/IP Rehab Facility: [facility]   Date of Admission: 1/23/2018  Date of Discharge: 1/25/2018   Reason(s) for Admission: left carotid endarterectomy (remvoal of plaque from cartoid artery) to reduce risk of future stroke             Problems taking medications regularly:  None       Medication changes since discharge: Yes, see below     Added the following medications:  Oxycodone 5 Mg immediate release tablet: take 1 by mouth every 4 hours if needed for pain  Sennosides-docusate (8.6-50 mg) tablet: Take 1 to 4 tablets by mouth 2 times daily if needed for constipation  tamsulosin 0.4 mg capsule: Take 1 capsule by mouth once daily after a meal    He has not taken any Tylenol in over a day.  No fevers, redness, drainage.  Feeling fatigued.  Gradually increasing his walking.            Problems adhering to non-medication therapy:  None      Follow up with Derick Lund NP and Dr. Yuen on 2/22/18  cartoid ultrasound on 2/22/2018 at 12:15 w/office visit at 1:30 pm    Pt notes this morning BP was 166/90.  He has been checking his blood pressure first thing in the morning.        Summary of hospitalization:  See outside records, reviewed and scanned  Diagnostic Tests/Treatments reviewed.  Follow up needed: see above  Other Healthcare Providers Involved in Patient s Care:         Specialist appointment - see above  Update since discharge: stable.     Post Discharge Medication Reconciliation: discharge medications reconciled and changed, per note/orders (see AVS).  Plan of care communicated with patient     Coding guidelines for this visit:  Type of Medical   Decision Making Face-to-Face Visit       within 7 Days of discharge Face-to-Face Visit        within 14 days of discharge   Moderate Complexity [id number]   High  Complexity 59038 [id number]                  Problem list and histories reviewed & adjusted, as indicated.  Additional history: as documented        Reviewed and updated as needed this visit by clinical staff  Meds       Reviewed and updated as needed this visit by Provider         ROS:  C: NEGATIVE for fever, chills, change in weight  INTEGUMENTARY/SKIN: NEGATIVE for worrisome rashes, moles or lesions  E/M: NEGATIVE for ear, mouth and throat problems  R: NEGATIVE for significant cough or SOB  CV: NEGATIVE for chest pain, palpitations or peripheral edema  GI: NEGATIVE for nausea, abdominal pain, heartburn, or change in bowel habits  : negative for retention, dysuria  MUSCULOSKELETAL: mild neck soreness  NEURO: NEGATIVE for weakness, dizziness or paresthesias  PSYCHIATRIC: NEGATIVE for changes in mood or affect    OBJECTIVE:     /85  Pulse 102  Temp 98.5  F (36.9  C) (Oral)  Resp 18  Wt 214 lb 8 oz (97.3 kg)  SpO2 98%  BMI 35.42 kg/m2  Body mass index is 35.42 kg/(m^2).  GENERAL: healthy, alert and no distress  NECK: mild swelling left neck  RESP: lungs clear to auscultation - no rales, rhonchi or wheezes  CV: regular rate and rhythm, normal S1 S2, no S3 or S4, no murmur, click or rub, no peripheral edema and peripheral pulses strong  MS: no gross musculoskeletal defects noted, no edema  SKIN: left neck surgical incision without erythema  PSYCH: mentation appears normal, affect normal/bright        ASSESSMENT/PLAN:             1. S/P carotid endarterectomy  He will follow up with surgeon on 2/22.    Patient Instructions   Send me your blood pressure readings in a week.     Let me know if you have any problems with urination after stopping Flomax.    Watch for any signs of infection at the surgical site.        2. Hypertension goal BP (blood pressure) < 140/90  See above.   Send blood pressure results in one week.     3. Hyperlipidemia LDL goal <70  Refills given.   - ezetimibe (ZETIA) 10 MG tablet; Take 1  tablet (10 mg) by mouth daily  Dispense: 90 tablet; Refill: PRN        Treva Saleem NP  [facility]

## 2018-01-29 NOTE — PATIENT INSTRUCTIONS
Send me your blood pressure readings in a week.     Let me know if you have any problems with urination after stopping Flomax.    Watch for any signs of infection at the surgical site.

## 2018-01-29 NOTE — MR AVS SNAPSHOT
After Visit Summary   1/29/2018    Braeden Umana    MRN: [id number]           Patient Information     Date Of Birth          1950        Visit Information        Provider Department      1/29/2018 2:30 PM Treva Saleem NP [facility]        Today's Diagnoses     S/P carotid endarterectomy    -  1    Hypertension goal BP (blood pressure) < 140/90        Hyperlipidemia LDL goal <70          Care Instructions    Send me your blood pressure readings in a week.     Let me know if you have any problems with urination after stopping Flomax.    Watch for any signs of infection at the surgical site.           Follow-ups after your visit        Your next 10 appointments already scheduled     Mar 28, 2018  2:30 PM CDT   Return Visit with Josue Clements MD   [facility] ([facility])    [address] [place] [address] [phone]              Who to contact     If you have questions or need follow up information about today's clinic visit or your schedule please contact [facility] directly at [phone].  Normal or non-critical lab and imaging results will be communicated to you by [employer], letter or phone within 4 business days after the clinic has received the results. If you do not hear from us within 7 days, please contact the clinic through [employer] or phone. If you have a critical or abnormal lab result, we will notify you by phone as soon as possible.  Submit refill requests through [employer] or call your pharmacy and they will forward the refill request to us. Please allow 3 business days for your refill to be completed.          Additional Information About Your Visit        MyChart Information     [employer] gives you secure access to your electronic health record. If you see a primary care provider, you can also send messages to your care team and make appointments. If you have questions,  please call your primary care clinic.  If you do not have a primary care provider, please call [phone] and they will assist you.        Care EveryWhere ID     This is your Care EveryWhere ID. This could be used by other organizations to access your [facility] medical records  [id number]        Your Vitals Were     Pulse Temperature Respirations Pulse Oximetry BMI (Body Mass Index)       102 98.5  F (36.9  C) (Oral) 18 98% 35.42 kg/m2        Blood Pressure from Last 3 Encounters:   01/29/18 132/85   01/17/18 139/80   12/27/17 141/73    Weight from Last 3 Encounters:   01/29/18 214 lb 8 oz (97.3 kg)   01/17/18 221 lb 9.6 oz (100.5 kg)   12/27/17 224 lb (101.6 kg)              Today, you had the following     No orders found for display         Where to get your medicines      These medications were sent to [facility] [address] [place] [address]     Phone:  [phone]     ezetimibe 10 MG tablet          Primary Care Provider Office Phone # Fax #    Treva Saleem [phone] [address] [facility] [address]        Equal Access to Services     STEVIE BUSTAMANTE : Hadii aad ku hadasho Soomaali, waaxda luqadaha, qaybta kaalmada adeegyada, waxay salina haytiera gan . So [facility] [phone].    ATENCIÓN: Si habla español, tiene a guajardo disposición servicios gratuitos de asistencia lingüística. Llame al [phone].    We comply with applicable federal civil rights laws and [place] laws. We do not discriminate on the basis of race, color, national origin, age, disability, sex, sexual orientation, or gender identity.            Thank you!     Thank you for choosing [facility]  for your care. Our goal is always to provide you with excellent care. Hearing back from our patients is one way we can continue to improve our services. Please take a few minutes to complete the  written survey that you may receive in the mail after your visit with us. Thank you!             Your Updated Medication List - Protect others around you: Learn how to safely use, store and throw away your medicines at www.disposemymeds.org.          This list is accurate as of 1/29/18  3:24 PM.  Always use your most recent med list.                   Brand Name Dispense Instructions for use Diagnosis    ALPRAZolam 0.25 MG tablet    XANAX     Take 0.25 mg by mouth 3 times daily as needed.        amLODIPine 10 MG tablet    NORVASC    90 tablet    Take 1 tablet (10 mg) by mouth daily    Hypertension goal BP (blood pressure) < 140/90       aspirin 325 MG tablet      Take  by mouth daily.        atenolol 50 MG tablet    TENORMIN    135 tablet    Take 1.5 tablets (75 mg) by mouth daily    Hypertension goal BP (blood pressure) < 140/90       BENADRYL 25 MG tablet   Generic drug:  diphenhydrAMINE      Take 25 mg by mouth every 6 hours as needed.        cholecalciferol 94802 UNITS capsule    VITAMIN D3    12 capsule    Take 1 capsule (50,000 Units) by mouth once a week for 12 doses    Vitamin D deficiency       CYMBALTA 60 MG EC capsule   Generic drug:  DULoxetine     0    1 CAPSULE ONCE DAILY        ezetimibe 10 MG tablet    ZETIA    90 tablet    Take 1 tablet (10 mg) by mouth daily    Hyperlipidemia LDL goal <70       NEURONTIN 300 MG capsule   Generic drug:  gabapentin     90 capsule    Take 300 mg by mouth daily Take 3 tablets daily        nitroGLYcerin 0.4 MG sublingual tablet    NITROSTAT    1 tablet    Place 0.4 mg under the tongue every 5 minutes as needed for chest pain Reported on 5/22/2017        PLAVIX 75 MG tablet   Generic drug:  clopidogrel     3 MONTHS    ONE DAILY        QUEtiapine 25 MG tablet    SEROquel     Take 25 mg by mouth Take 1 to 2 tablets by mouth at bedtime    Diplopia, Memory loss, Fatigue, unspecified type       ranitidine 150 MG tablet    ZANTAC    60 tablet    Take 1 tablet (150 mg) by mouth  2 times daily    Gastroesophageal reflux disease, esophagitis presence not specified       STATIN NOT PRESCRIBED (INTENTIONAL)      Please choose reason not prescribed, below    Atherosclerosis of coronary artery of native heart, angina presence unspecified, unspecified vessel or lesion type

## 2018-01-30 ENCOUNTER — MYC MEDICAL ADVICE (OUTPATIENT)
Dept: FAMILY MEDICINE | Facility: CLINIC | Age: 68
End: 2018-01-30

## 2018-02-06 ENCOUNTER — MYC MEDICAL ADVICE (OUTPATIENT)
Dept: FAMILY MEDICINE | Facility: CLINIC | Age: 68
End: 2018-02-06

## 2018-02-06 DIAGNOSIS — H53.2 DIPLOPIA: ICD-10-CM

## 2018-02-06 DIAGNOSIS — R41.3 MEMORY LOSS: ICD-10-CM

## 2018-02-06 DIAGNOSIS — R53.83 FATIGUE, UNSPECIFIED TYPE: ICD-10-CM

## 2018-02-06 NOTE — TELEPHONE ENCOUNTER
He should just go back on Atenolol - he can take 1/2 tablet (25 mg) for a week then increase to one tablet.  Call with blood pressure readings again in 2 weeks.

## 2018-02-06 NOTE — TELEPHONE ENCOUNTER
"On 7/30/14 Losartan was stopped as \"therapy completed\" by a LPN, likely when pt was being roomed that day for visit with Dr Mckeon. However provider at that visit did not say anything about stopping the losartan and BP was high at that visit.     I did call pt. Med list carefully gone over with pt.     He sees cardiologist outside of [place]- [facility]- records are scanned into EPIC. I did encourage him to make f/u appt with cardiology as they told him at recent visit in [place] ED he needs appt in March with cardiology. He agrees to do this now.     Meds changes from current EPIC list:    1. Pt taking low dose asa, not 325 mg. This was changed on record    2. Pt takes Seroquel 12.5 at HS only, more than this made him groggy. Med list was updated with just a note. This is prescribed by psychologist.    3. Pt was confused, he thought you wanted him to increase his amodipine to 1 1/2 pills daily so that is what he has done since seeing you. I instructed him to go back to 1 tab amlodipine daily (10m). He agrees.     4. Pt had been out of his atenolol when he had seen you at his  visit on 1/17/18 - he had not told you that. He was then told to increase his atenolol to 75 mg daily by you but because he hadn't been taking it at all do you want him to stay with just 50 mg. He hasn't picked up his new rx of atenolol from you and has been not taking for a while. New BP readings he just sent on this MyChart were all taken without any atenolol on board.     He would like response on [employer] so it is written down.     Trudy Clarke, RN, BSN           "

## 2018-02-06 NOTE — TELEPHONE ENCOUNTER
ANDRÉS review: Here are BP readings from patient.  You saw him 1/29/18 and he was to send in the weekly results.  Alice Barnett RN

## 2018-02-06 NOTE — TELEPHONE ENCOUNTER
He was on Losartan for many years.  It is now off of his med list, but I can't tell when or why this was stopped.  Can you clarify this please?

## 2018-02-07 NOTE — TELEPHONE ENCOUNTER
Spoke with patient and relayed the atenolol dosing plan.  He will call with BP readings in 2 weeks.  Alice Barnett RN

## 2018-02-26 ENCOUNTER — OFFICE VISIT (OUTPATIENT)
Dept: FAMILY MEDICINE | Facility: CLINIC | Age: 68
End: 2018-02-26
Payer: COMMERCIAL

## 2018-02-26 VITALS
WEIGHT: 216 LBS | OXYGEN SATURATION: 97 % | BODY MASS INDEX: 35.67 KG/M2 | SYSTOLIC BLOOD PRESSURE: 133 MMHG | HEART RATE: 69 BPM | RESPIRATION RATE: 18 BRPM | DIASTOLIC BLOOD PRESSURE: 74 MMHG | TEMPERATURE: 98.2 F

## 2018-02-26 DIAGNOSIS — I10 HYPERTENSION GOAL BP (BLOOD PRESSURE) < 140/90: Primary | ICD-10-CM

## 2018-02-26 DIAGNOSIS — M54.2 NECK PAIN: ICD-10-CM

## 2018-02-26 PROCEDURE — 99214 OFFICE O/P EST MOD 30 MIN: CPT | Performed by: NURSE PRACTITIONER

## 2018-02-26 RX ORDER — LOSARTAN POTASSIUM 50 MG/1
50 TABLET ORAL DAILY
Qty: 90 TABLET | Status: SHIPPED | OUTPATIENT
Start: 2018-02-26 | End: 2020-01-15

## 2018-02-26 RX ORDER — ATENOLOL 50 MG/1
50 TABLET ORAL DAILY
Qty: 135 TABLET | Refills: 0
Start: 2018-02-26 | End: 2018-10-12

## 2018-02-26 NOTE — PROGRESS NOTES
SUBJECTIVE:   Braeden Umana is a 68 year old male who presents to clinic today for the following health issues:      Hypertension Follow-up      Outpatient blood pressures are being checked at home.  Results are the same as a few weeks ago. 150s/90s    Low Salt Diet: low salt      Amount of exercise or physical activity: working out now 2 days a week     Problems taking medications regularly: No    Medication side effects: Atenolol     Diet: regular (no restrictions) and heart healthy diet     He used to be on Losartan and cannot recall why this was changed.  He has had some medication mix ups after being hospitalized several different times in a different system, since his cardiologist is through [facility].     Neck Pain  He has had some neck pain since his surgery, slowly improving.  He would like to try some PT.  He denies any radicular pain, weakness, paresthesias.         Problem list and histories reviewed & adjusted, as indicated.  Additional history: as documented        Reviewed and updated as needed this visit by clinical staff  Allergies  Meds       Reviewed and updated as needed this visit by Provider         ROS:  CONSTITUTIONAL: NEGATIVE for fever, chills, change in weight  ENT/MOUTH: NEGATIVE for ear, mouth and throat problems  RESP: NEGATIVE for significant cough or SOB  CV: NEGATIVE for chest pain, palpitations or peripheral edema  GI: NEGATIVE for nausea, abdominal pain, heartburn, or change in bowel habits  MS: neck pain  NEURO: NEGATIVE for weakness, dizziness or paresthesias  PSYCHIATRIC: NEGATIVE for changes in mood or affect    OBJECTIVE:     /74  Pulse 69  Temp 98.2  F (36.8  C) (Oral)  Resp 18  Wt 216 lb (98 kg)  SpO2 97%  BMI 35.67 kg/m2  Body mass index is 35.67 kg/(m^2).  GENERAL: healthy, alert and no distress  RESP: lungs clear to auscultation - no rales, rhonchi or wheezes  CV: regular rate and rhythm, normal S1 S2, no S3 or S4, no murmur, click or rub, no peripheral  edema and peripheral pulses strong  PSYCH: mentation appears normal, affect normal/bright        ASSESSMENT/PLAN:             1. Hypertension goal BP (blood pressure) < 140/90  Not at goal using home readings  Will restart Losartan.  Have him see MTM in 2 weeks for medication review.   - atenolol (TENORMIN) 50 MG tablet; Take 1 tablet (50 mg) by mouth daily  Dispense: 135 tablet; Refill: 0  - losartan (COZAAR) 50 MG tablet; Take 1 tablet (50 mg) by mouth daily  Dispense: 90 tablet; Refill: PRN    2. Neck pain  Will refer to PT.   - [facility]        Treva Saleem NP  [facility]

## 2018-02-26 NOTE — MR AVS SNAPSHOT
After Visit Summary   2/26/2018    Braeden Umana    MRN: [id number]           Patient Information     Date Of Birth          1950        Visit Information        Provider Department      2/26/2018 1:30 PM Treva Saleem NP [facility]        Today's Diagnoses     Hypertension goal BP (blood pressure) < 140/90    -  1    Neck pain          Care Instructions    Schedule an appointment to see Kevin ([facility]) in about 2 weeks.           Follow-ups after your visit        Additional Services     [facility] PT, HAND, AND CHIROPRACTIC REFERRAL       **This order will print in the [facility] Scheduling Office**    Physical Therapy, Hand Therapy and Chiropractic Care are available through:    *[facility]  *[facility]  *[facility]    Call one number to schedule at any of the above locations: [phone].    Your provider has referred you to: Physical Therapy at [facility] or [facility]    Indication/Reason for Referral: neck and back pain  Onset of Illness:   Therapy Orders: Evaluate and Treat  Special Programs:   Special Request:     Kellie Hopkins      Additional Comments for the Therapist or Chiropractor:     Please be aware that coverage of these services is subject to the terms and limitations of your health insurance plan.  Call member services at your health plan with any benefit or coverage questions.      Please bring the following to your appointment:    *Your personal calendar for scheduling future appointments  *Comfortable clothing                  Your next 10 appointments already scheduled     Feb 27, 2018  1:45 PM CST   Neuro Treatment with Merlene Burrell OT   [facility] ([facility])    [address] [place] [id number]   [phone]            Mar 28, 2018  2:30 PM CDT   Return Visit with Josue Clements MD   [facility] ([facility])     [address] [place] [id number] [phone]              Who to contact     If you have questions or need follow up information about today's clinic visit or your schedule please contact [facility] directly at [phone].  Normal or non-critical lab and imaging results will be communicated to you by MyChart, letter or phone within 4 business days after the clinic has received the results. If you do not hear from us within 7 days, please contact the clinic through [employer] or phone. If you have a critical or abnormal lab result, we will notify you by phone as soon as possible.  Submit refill requests through [employer] or call your pharmacy and they will forward the refill request to us. Please allow 3 business days for your refill to be completed.          Additional Information About Your Visit        [employer] Information     [employer] gives you secure access to your electronic health record. If you see a primary care provider, you can also send messages to your care team and make appointments. If you have questions, please call your primary care clinic.  If you do not have a primary care provider, please call [phone] and they will assist you.        Care EveryWhere ID     This is your Care EveryWhere ID. This could be used by other organizations to access your [facility] medical records  [id number]        Your Vitals Were     Pulse Temperature Respirations Pulse Oximetry BMI (Body Mass Index)       69 98.2  F (36.8  C) (Oral) 18 97% 35.67 kg/m2        Blood Pressure from Last 3 Encounters:   02/26/18 133/74   01/29/18 132/85   01/17/18 139/80    Weight from Last 3 Encounters:   02/26/18 216 lb (98 kg)   01/29/18 214 lb 8 oz (97.3 kg)   01/17/18 221 lb 9.6 oz (100.5 kg)              We Performed the Following     [facility] PT, HAND, AND CHIROPRACTIC REFERRAL          Today's Medication Changes          These changes are accurate as of 2/26/18  2:29 PM.  If you have any questions,  ask your nurse or doctor.               Start taking these medicines.        Dose/Directions    losartan 50 MG tablet   Commonly known as:  COZAAR   Used for:  Hypertension goal BP (blood pressure) < 140/90   Started by:  Treva Saleem NP        Dose:  50 mg   Take 1 tablet (50 mg) by mouth daily   Quantity:  90 tablet   Refills:  PRN         These medicines have changed or have updated prescriptions.        Dose/Directions    atenolol 50 MG tablet   Commonly known as:  TENORMIN   Indication:  2 daily   This may have changed:  how much to take   Used for:  Hypertension goal BP (blood pressure) < 140/90   Changed by:  Treva Saleem NP        Dose:  50 mg   Take 1 tablet (50 mg) by mouth daily   Quantity:  135 tablet   Refills:  0            Where to get your medicines      These medications were sent to [facility] [address] [place] [address] [place] - [address] [facility]  [address] [facility] [address]     Phone:  [phone]     losartan 50 MG tablet         Some of these will need a paper prescription and others can be bought over the counter.  Ask your nurse if you have questions.     You don't need a prescription for these medications     atenolol 50 MG tablet                Primary Care Provider Office Phone # Fax #    Treva Saleem [phone] [address] [facility] [address]        Equal Access to Services     STEVIE BUSTAMANTE AH: Hadii lainey ku hadasho Soomaali, waaxda luqadaha, qaybta kaalmada adeegyada, waxay idiin hayaan adequang kherika antoine. So [facility] [phone].    ATENCIÓN: Si habla español, tiene a guajardo disposición servicios gratuitos de asistencia lingüística. Llame al [phone].    We comply with applicable federal civil rights laws and [place] laws. We do not discriminate on the basis of race, color, national origin, age, disability, sex, sexual orientation, or gender identity.            Thank you!     Thank you for choosing [facility]   for your care. Our goal is always to provide you with excellent care. Hearing back from our patients is one way we can continue to improve our services. Please take a few minutes to complete the written survey that you may receive in the mail after your visit with us. Thank you!             Your Updated Medication List - Protect others around you: Learn how to safely use, store and throw away your medicines at www.disposemymeds.org.          This list is accurate as of 2/26/18  2:29 PM.  Always use your most recent med list.                   Brand Name Dispense Instructions for use Diagnosis    ALPRAZolam 0.25 MG tablet    XANAX     Take 0.25 mg by mouth 3 times daily as needed.        amLODIPine 10 MG tablet    NORVASC    90 tablet    Take 1 tablet (10 mg) by mouth daily    Hypertension goal BP (blood pressure) < 140/90       aspirin 81 MG EC tablet     90 tablet    Take 1 tablet (81 mg) by mouth daily        atenolol 50 MG tablet    TENORMIN    135 tablet    Take 1 tablet (50 mg) by mouth daily    Hypertension goal BP (blood pressure) < 140/90       BENADRYL 25 MG tablet   Generic drug:  diphenhydrAMINE      Take 25 mg by mouth every 6 hours as needed.        cholecalciferol 45200 UNITS capsule    VITAMIN D3    12 capsule    Take 1 capsule (50,000 Units) by mouth once a week for 12 doses    Vitamin D deficiency       CYMBALTA 60 MG EC capsule   Generic drug:  DULoxetine     0    1 CAPSULE ONCE DAILY        ezetimibe 10 MG tablet    ZETIA    90 tablet    Take 1 tablet (10 mg) by mouth daily    Hyperlipidemia LDL goal <70       losartan 50 MG tablet    COZAAR    90 tablet    Take 1 tablet (50 mg) by mouth daily    Hypertension goal BP (blood pressure) < 140/90       NEURONTIN 300 MG capsule   Generic drug:  gabapentin     90 capsule    Take 300 mg by mouth daily Take 3 tablets daily        nitroGLYcerin 0.4 MG sublingual tablet    NITROSTAT    1 tablet    Place 0.4 mg under the tongue every 5 minutes as needed for  chest pain Reported on 5/22/2017        PLAVIX 75 MG tablet   Generic drug:  clopidogrel     3 MONTHS    ONE DAILY        QUEtiapine 25 MG tablet    SEROquel     Take 25 mg by mouth Take 1 to 2 tablets by mouth at bedtime    Diplopia, Memory loss, Fatigue, unspecified type       ranitidine 150 MG tablet    ZANTAC    60 tablet    Take 1 tablet (150 mg) by mouth 2 times daily    Gastroesophageal reflux disease, esophagitis presence not specified       STATIN NOT PRESCRIBED (INTENTIONAL)      Please choose reason not prescribed, below    Atherosclerosis of coronary artery of native heart, angina presence unspecified, unspecified vessel or lesion type

## 2018-02-27 ENCOUNTER — HOSPITAL ENCOUNTER (OUTPATIENT)
Dept: OCCUPATIONAL THERAPY | Facility: CLINIC | Age: 68
Setting detail: THERAPIES SERIES
End: 2018-02-27
Attending: PSYCHIATRY & NEUROLOGY
Payer: MEDICARE

## 2018-02-27 PROCEDURE — 97535 SELF CARE MNGMENT TRAINING: CPT | Mod: GO | Performed by: OCCUPATIONAL THERAPIST

## 2018-02-27 PROCEDURE — G9170 MEMORY D/C STATUS: HCPCS | Mod: GO,CI | Performed by: OCCUPATIONAL THERAPIST

## 2018-02-27 PROCEDURE — 40000125 ZZHC STATISTIC OT OUTPT VISIT: Performed by: OCCUPATIONAL THERAPIST

## 2018-02-27 PROCEDURE — G9169 MEMORY GOAL STATUS: HCPCS | Mod: GO,CI | Performed by: OCCUPATIONAL THERAPIST

## 2018-02-28 NOTE — PROGRESS NOTES
Outpatient Occupational Therapy Discharge Note     Patient: Braeden Umana  : 1950    Beginning/End Dates of Reporting Period:  2018 to 2018     Referring Provider: Josue Clements MD    Therapy Diagnosis: Cognitive complaints    Client Self Report: Since last session, patient has had carotid endarectomy.  Patient also saw an eye MD and now has prism glasses for his double vision.  He no longer has double vision.  He also is on a new medications which his helping him sleep better at night, patient denies any fatigue.     Objective Measurements:     Cognitive Performance Test:  5.5/5.6.  See separate progress note dated 2018.    Goals:     Goal Identifier 1-CPT   Goal Description Patient to verbalize understanding of Cognitive Performance Test results and identify 3 strategies to increase patient's safety and independence in the home setting.   Target Date 18   Date Met  18   Progress: Goal met.     Goal Identifier 2-STM   Goal Description Patient will demonstrate improved memory skills by completing short-term memory tasks in occupational therapy with 85-90% accuracy using compensatory strategies for increased safety and independence with ADL/IADLS (remembering to take medications, turn off the stove when done, etc.).   Target Date 18   Date Met      Progress:  Goal partially met.      Goal Identifier 3-Memory Compensation   Goal Description Patient to utilize memory tools (planner, calendar, etc.) effectively and independently for increased ability to manage his time, remember to take medications, appointments, daily schedule, etc.    Target Date 18   Date Met  18   Progress: Goal met.     Goal Identifier 4-Fatigue   Goal Description Patient to verbalize 3 strategies for energy conservation/work simplification and fatigue management for improved independence with ADL/IADLs at home and in the community   Target Date 18   Date Met  18    Progress:  Goal met.       Progress Toward Goals:   Progress this reporting period: Excellent.  Patient has been seen for OT evaluation, completion of the Cognitive Performance test, and one session for education in memory compensatory techniques.  He has been using strategies daily, fatigue has resolved, and patient feeling very good.  Issued activities/resources for patient to stay cognitively fit.  No further skilled OT intervention needed.     Plan:  Discharge from therapy.    Discharge:    Reason for Discharge: Patient has met all goals.    Equipment Issued: None    Discharge Plan: Patient to continue home program.

## 2018-03-01 ENCOUNTER — THERAPY VISIT (OUTPATIENT)
Dept: PHYSICAL THERAPY | Facility: CLINIC | Age: 68
End: 2018-03-01
Payer: MEDICARE

## 2018-03-01 DIAGNOSIS — M54.2 CERVICAL PAIN: Primary | ICD-10-CM

## 2018-03-01 PROCEDURE — G8985 CARRY GOAL STATUS: HCPCS | Mod: GP | Performed by: PHYSICAL THERAPIST

## 2018-03-01 PROCEDURE — 97161 PT EVAL LOW COMPLEX 20 MIN: CPT | Mod: GP | Performed by: PHYSICAL THERAPIST

## 2018-03-01 PROCEDURE — G8984 CARRY CURRENT STATUS: HCPCS | Mod: GP | Performed by: PHYSICAL THERAPIST

## 2018-03-01 PROCEDURE — 97110 THERAPEUTIC EXERCISES: CPT | Mod: GP | Performed by: PHYSICAL THERAPIST

## 2018-03-01 NOTE — LETTER
DEPARTMENT OF HEALTH AND HUMAN SERVICES  CENTERS FOR MEDICARE & MEDICAID SERVICES    PLAN/UPDATED PLAN OF PROGRESS FOR OUTPATIENT REHABILITATION    PATIENTS NAME:  Braeden Umana   : 1950  PROVIDER NUMBER:    [id number]  [id number]  PROVIDER NAME: [facility]  MEDICAL RECORD NUMBER: [id number]   START OF CARE DATE:  SOC Date: 18   TYPE:  PT  PRIMARY/TREATMENT DIAGNOSIS: (Pertinent Medical Diagnosis)  Cervical pain    VISITS FROM START OF CARE:  Rxs Used: 1     [facility] Initial Evaluation  Subjective:  Pt presents to PT with a chief complaint of left sided cervical pain with reported onset after his left carotid endartectomy on 18. Primary pain location identified at L upper trapezius with radiation into medial scapula. Patient denies any radiating distal sxs or any numbness/tingling. Pain has gradually improved since onset but still present early in the morning, looking over both shoulders, and reaching overhead.     Patient is a 68 year old male presenting with rehab cervical spine hpi.   Braeden Umana is a 68 year old male with a cervical spine condition.  Occurance: s/p carotid endarectomy.  Condition occurred: other (surgery).  This is a new condition  18.    Patient reports pain:  Cervical left side.  Radiates to:  Shoulder left (scapula left).  Pain is described as sharp and stabbing and is intermittent and reported as 8/10.  Associated symptoms:  Loss of motion/stiffness. Pain is worse in the A.M..  Symptoms are exacerbated by rotating head, lifting, carrying and lying down and relieved by rest.  Since onset symptoms are gradually improving.        General health as reported by patient is good.  Pertinent medical history includes:  High blood pressure, heart problems, overweight and depression.  Medical allergies: see chart.  Other surgeries include:  Heart surgery (stents; carotid artery).   Current medications:  Anti-inflammatory.  Current occupation is retired.        Barriers include:  None as reported by the patient.  Red flags:  None as reported by the patient.    Objective:  Standing Alignment:    Cervical/Thoracic:  Forward head and thoracic kyphosis increased    Cervical/Thoracic Evaluation  AROM:  AROM Cervical:  Flexion:            Min loss  Extension:       Min loss, pain + L UT  Rotation:         Left: Min loss, pain +     Right: Min loss  Side Bend:      Left: Mod loss     Right:  Mod loss  PATIENTS NAME:  Braeden Umana   : 1950    Cervical Myotomes:  normal    Cervical Palpation:    Tenderness present at Left:    Sternocleidomstoid; Upper Trap and Levator    Spinal Segmental Conclusions:  Pain w/ PA mobility testing at lower cervical   Level:  Hypo at C6, C7, T5 and T6    Shoulder Evaluation:  ROM:  AROM:    Flexion:  Left:  90*    Right:  150  Abduction:  Left: 80*   Right:  150    PROM:    Flexion:  Left:  145*    Right: 155    Abduction:  Left:  145    Right:  155  Pain: Pain with L shoulder active elevation ++;     Strength:    Flexion: Left:5-/5   Pain:    Right: 5-/5     Pain:   Abduction:  Left: 4-/5   Pain:++    Right: 4+/5     Pain:  External Rotation:   Left:4+/5     Pain:   Right:4+/5     Pain:      Assessment/Plan:    Patient is a 68 year old male with cervical complaints.    Patient has the following significant findings with corresponding treatment plan.                Diagnosis 1:  L sided cervical pain    Pain -  hot/cold therapy, manual therapy, self management and education  Decreased ROM/flexibility - manual therapy and therapeutic exercise  Decreased joint mobility - manual therapy and therapeutic exercise  Decreased strength - therapeutic exercise and therapeutic activities  Impaired muscle performance - neuro re-education  Impaired posture - neuro re-education    Therapy Evaluation Codes:   1) History comprised of:   Personal factors that impact the plan  of care:      None.    Comorbidity factors that impact the plan of care are:      None.     Medications impacting care: None.  2) Examination of Body Systems comprised of:   Body structures and functions that impact the plan of care:      Cervical spine.   Activity limitations that impact the plan of care are:      Driving, Dressing, Lifting, Reading/Computer work and Sitting.  3) Clinical presentation characteristics are:   Stable/Uncomplicated.    PATIENTS NAME:  Braeden Umana   : 1950    4) Decision-Making    Low complexity using standardized patient assessment instrument and/or   measureable assessment of functional outcome.  Cumulative Therapy Evaluation is: Low complexity.    Previous and current functional limitations:  (See Goal Flow Sheet for this information)    Short term and Long term goals: (See Goal Flow Sheet for this information)     Communication ability:  Patient appears to be able to clearly communicate and understand verbal and written communication and follow directions correctly.  Treatment Explanation - The following has been discussed with the patient:   RX ordered/plan of care  Anticipated outcomes  Possible risks and side effects  This patient would benefit from PT intervention to resume normal activities.   Rehab potential is good.    Frequency:  1 X week, once daily  Duration:  for 6 weeks tapering to 2 X a month over 1 month    Discharge Plan:  Achieve all LTG.  Independent in home treatment program.  Reach maximal therapeutic benefit.    Please refer to the daily flowsheet for treatment today, total treatment time and time spent performing 1:1 timed codes.         Caregiver Signature/Credentials _____________________________ Date ________        Bari Talbot DPT   I have reviewed and certified the need for these services and plan of treatment while under my care.        PHYSICIAN'S SIGNATURE:   ______________________________________ Date___________     Treva Saleem,  "NP    Certification period:  Beginning of Cert date period: 03/01/18 to  End of Cert period date: 05/29/18     Functional Level Progress Report: Please see attached \"Goal Flow sheet for Functional level.\"    ____X____ Continue Services or       ________ DC Services                Service dates: From  SOC Date: 03/01/18 date to present                         "

## 2018-03-01 NOTE — PROGRESS NOTES
[facility] Initial Evaluation    Subjective:  Pt presents to PT with a chief complaint of left sided cervical pain with reported onset after his left carotid endartectomy on 1/23/18. Primary pain location identified at L upper trapezius with radiation into medial scapula. Patient denies any radiating distal sxs or any numbness/tingling. Pain has gradually improved since onset but still present early in the morning, looking over both shoulders, and reaching overhead.     Patient is a 68 year old male presenting with rehab cervical spine hpi.   Braeden Umana is a 68 year old male with a cervical spine condition.  Occurance: s/p carotid endarectomy.  Condition occurred: other (surgery).  This is a new condition  1/23/18.    Patient reports pain:  Cervical left side.  Radiates to:  Shoulder left (scapula left).  Pain is described as sharp and stabbing and is intermittent and reported as 8/10.  Associated symptoms:  Loss of motion/stiffness. Pain is worse in the A.M..  Symptoms are exacerbated by rotating head, lifting, carrying and lying down and relieved by rest.  Since onset symptoms are gradually improving.        General health as reported by patient is good.  Pertinent medical history includes:  High blood pressure, heart problems, overweight and depression.  Medical allergies: see chart.  Other surgeries include:  Heart surgery (stents; carotid artery).  Current medications:  Anti-inflammatory.  Current occupation is retired.        Barriers include:  None as reported by the patient.    Red flags:  None as reported by the patient.                        Objective:  Standing Alignment:    Cervical/Thoracic:  Forward head and thoracic kyphosis increased                                    Cervical/Thoracic Evaluation    AROM:  AROM Cervical:    Flexion:            Min loss  Extension:       Min loss, pain + L UT  Rotation:         Left: Min loss, pain +     Right: Min loss  Side Bend:       Left: Mod loss     Right:  Mod loss        Cervical Myotomes:  normal                        Cervical Palpation:    Tenderness present at Left:    Sternocleidomstoid; Upper Trap and Levator        Spinal Segmental Conclusions:  Pain w/ PA mobility testing at lower cervical   Level:  Hypo at C6, C7, T5 and T6             Shoulder Evaluation:  ROM:  AROM:    Flexion:  Left:  90*    Right:  150    Abduction:  Left: 80*   Right:  150                      PROM:    Flexion:  Left:  145*    Right: 155      Abduction:  Left:  145    Right:  155                      Pain: Pain with L shoulder active elevation ++;     Strength:    Flexion: Left:5-/5   Pain:    Right: 5-/5     Pain:     Abduction:  Left: 4-/5   Pain:++    Right: 4+/5     Pain:      External Rotation:   Left:4+/5     Pain:   Right:4+/5     Pain:                                                     General     ROS    Assessment/Plan:    Patient is a 68 year old male with cervical complaints.    Patient has the following significant findings with corresponding treatment plan.                Diagnosis 1:  L sided cervical pain    Pain -  hot/cold therapy, manual therapy, self management and education  Decreased ROM/flexibility - manual therapy and therapeutic exercise  Decreased joint mobility - manual therapy and therapeutic exercise  Decreased strength - therapeutic exercise and therapeutic activities  Impaired muscle performance - neuro re-education  Impaired posture - neuro re-education    Therapy Evaluation Codes:   1) History comprised of:   Personal factors that impact the plan of care:      None.    Comorbidity factors that impact the plan of care are:      None.     Medications impacting care: None.  2) Examination of Body Systems comprised of:   Body structures and functions that impact the plan of care:      Cervical spine.   Activity limitations that impact the plan of care are:      Driving, Dressing, Lifting, Reading/Computer work and  Sitting.  3) Clinical presentation characteristics are:   Stable/Uncomplicated.  4) Decision-Making    Low complexity using standardized patient assessment instrument and/or measureable assessment of functional outcome.  Cumulative Therapy Evaluation is: Low complexity.    Previous and current functional limitations:  (See Goal Flow Sheet for this information)    Short term and Long term goals: (See Goal Flow Sheet for this information)     Communication ability:  Patient appears to be able to clearly communicate and understand verbal and written communication and follow directions correctly.  Treatment Explanation - The following has been discussed with the patient:   RX ordered/plan of care  Anticipated outcomes  Possible risks and side effects  This patient would benefit from PT intervention to resume normal activities.   Rehab potential is good.    Frequency:  1 X week, once daily  Duration:  for 6 weeks tapering to 2 X a month over 1 month    Discharge Plan:  Achieve all LTG.  Independent in home treatment program.  Reach maximal therapeutic benefit.    Please refer to the daily flowsheet for treatment today, total treatment time and time spent performing 1:1 timed codes.

## 2018-03-01 NOTE — MR AVS SNAPSHOT
After Visit Summary   3/1/2018    Braeden Umana    MRN: [id number]           Patient Information     Date Of Birth          1950        Visit Information        Provider Department      3/1/2018 2:00 PM Bari Talbot PT [facility]        Today's Diagnoses     Cervical pain    -  1       Follow-ups after your visit        Your next 10 appointments already scheduled     Mar 08, 2018  4:00 PM CST   SHERWIN Spine with Bari Talbot PT   [facility] ([facility]  )    [address] [place] [id number]   [phone]            Mar 13, 2018 11:00 AM CDT   Office Visit with Kevin Nunez RPH   [facility] ([facility])    [address] [place] [id number] [phone]           Bring a current list of meds and any records pertaining to this visit. For Physicals, please bring immunization records and any forms needing to be filled out. Please arrive 10 minutes early to complete paperwork.            Mar 15, 2018  4:00 PM CDT   SHERWIN Spine with Bari Talbot PT   [facility] ([facility]  )    [address] [place] [id number] [phone]            Mar 22, 2018  4:00 PM CDT   SHERWIN Spine with Bari Talbot PT   [facility] ([facility]  )    [address] [place] [id number] [phone]            Mar 28, 2018  2:30 PM CDT   Return Visit with Josue Clements MD   [facility] ([facility])    [address] [place] [id number] [phone]            Mar 29, 2018  4:00 PM CDT   SHERWIN Spine with Bari Talbot PT   [facility] ([facility]  )    [address] [place] [address]  [place] [id number] [phone]              Who to contact     If you have questions or need follow up information about today's clinic visit or your schedule please contact [facility] [facility] directly at [phone].  Normal or non-critical lab and imaging results will be communicated to you by MyChart, letter or phone within 4 business days after the clinic has received the results. If you do not hear from us within 7 days, please contact the clinic through [facility] or phone. If you have a critical or abnormal lab result, we will notify you by phone as soon as possible.  Submit refill requests through [employer] or call your pharmacy and they will forward the refill request to us. Please allow 3 business days for your refill to be completed.          Additional Information About Your Visit        [facility] Information     [employer] gives you secure access to your electronic health record. If you see a primary care provider, you can also send messages to your care team and make appointments. If you have questions, please call your primary care clinic.  If you do not have a primary care provider, please call [phone] and they will assist you.        Care EveryWhere ID     This is your Care EveryWhere ID. This could be used by other organizations to access your [facility] medical records  [id number]         Blood Pressure from Last 3 Encounters:   02/26/18 133/74   01/29/18 132/85   01/17/18 139/80    Weight from Last 3 Encounters:   02/26/18 98 kg (216 lb)   01/29/18 97.3 kg (214 lb 8 oz)   01/17/18 100.5 kg (221 lb 9.6 oz)              We Performed the Following     HC PT EVAL, LOW COMPLEXITY     SHERWIN CERT REPORT     SHERWIN INITIAL EVAL REPORT     THERAPEUTIC EXERCISES        Primary Care Provider Office Phone # Fax #    Treva Saleem [phone]       [address] [facility] [address]        Equal Access to Services     STEVIE BUSTAMANTE AH: Malvin butler  rica Gan, wakareemda lurommeladaha, qaybta kaalmada yudelka, didi tanjain hayaan neelimaquang abielerika laklarissatra elina. So [facility] [phone].    ATENCIÓN: Si gerrila kaylee, tiene a guajardo disposición servicios gratuitos de asistencia lingüística. Hebert al [phone].    We comply with applicable federal civil rights laws and [place] laws. We do not discriminate on the basis of race, color, national origin, age, disability, sex, sexual orientation, or gender identity.            Thank you!     Thank you for choosing [facility]  for your care. Our goal is always to provide you with excellent care. Hearing back from our patients is one way we can continue to improve our services. Please take a few minutes to complete the written survey that you may receive in the mail after your visit with us. Thank you!             Your Updated Medication List - Protect others around you: Learn how to safely use, store and throw away your medicines at www.disposemymeds.org.          This list is accurate as of 3/1/18 11:59 PM.  Always use your most recent med list.                   Brand Name Dispense Instructions for use Diagnosis    ALPRAZolam 0.25 MG tablet    XANAX     Take 0.25 mg by mouth 3 times daily as needed.        amLODIPine 10 MG tablet    NORVASC    90 tablet    Take 1 tablet (10 mg) by mouth daily    Hypertension goal BP (blood pressure) < 140/90       aspirin 81 MG EC tablet     90 tablet    Take 1 tablet (81 mg) by mouth daily        atenolol 50 MG tablet    TENORMIN    135 tablet    Take 1 tablet (50 mg) by mouth daily    Hypertension goal BP (blood pressure) < 140/90       BENADRYL 25 MG tablet   Generic drug:  diphenhydrAMINE      Take 25 mg by mouth every 6 hours as needed.        cholecalciferol 52562 UNITS capsule    VITAMIN D3    12 capsule    Take 1 capsule (50,000 Units) by mouth once a week for 12 doses    Vitamin D deficiency       CYMBALTA 60 MG EC capsule   Generic  drug:  DULoxetine     0    1 CAPSULE ONCE DAILY        ezetimibe 10 MG tablet    ZETIA    90 tablet    Take 1 tablet (10 mg) by mouth daily    Hyperlipidemia LDL goal <70       losartan 50 MG tablet    COZAAR    90 tablet    Take 1 tablet (50 mg) by mouth daily    Hypertension goal BP (blood pressure) < 140/90       NEURONTIN 300 MG capsule   Generic drug:  gabapentin     90 capsule    Take 300 mg by mouth daily Take 3 tablets daily        nitroGLYcerin 0.4 MG sublingual tablet    NITROSTAT    1 tablet    Place 0.4 mg under the tongue every 5 minutes as needed for chest pain Reported on 5/22/2017        PLAVIX 75 MG tablet   Generic drug:  clopidogrel     3 MONTHS    ONE DAILY        QUEtiapine 25 MG tablet    SEROquel     Take 25 mg by mouth Take 1 to 2 tablets by mouth at bedtime    Diplopia, Memory loss, Fatigue, unspecified type       ranitidine 150 MG tablet    ZANTAC    60 tablet    Take 1 tablet (150 mg) by mouth 2 times daily    Gastroesophageal reflux disease, esophagitis presence not specified       STATIN NOT PRESCRIBED (INTENTIONAL)      Please choose reason not prescribed, below    Atherosclerosis of coronary artery of native heart, angina presence unspecified, unspecified vessel or lesion type

## 2018-03-05 PROBLEM — M54.2 CERVICAL PAIN: Status: ACTIVE | Noted: 2018-03-05

## 2018-03-08 ENCOUNTER — THERAPY VISIT (OUTPATIENT)
Dept: PHYSICAL THERAPY | Facility: CLINIC | Age: 68
End: 2018-03-08
Payer: MEDICARE

## 2018-03-08 DIAGNOSIS — M54.2 CERVICAL PAIN: ICD-10-CM

## 2018-03-08 PROCEDURE — 97112 NEUROMUSCULAR REEDUCATION: CPT | Mod: GP | Performed by: PHYSICAL THERAPIST

## 2018-03-08 PROCEDURE — 97110 THERAPEUTIC EXERCISES: CPT | Mod: GP | Performed by: PHYSICAL THERAPIST

## 2018-03-08 PROCEDURE — 97140 MANUAL THERAPY 1/> REGIONS: CPT | Mod: GP | Performed by: PHYSICAL THERAPIST

## 2018-03-14 ENCOUNTER — DOCUMENTATION ONLY (OUTPATIENT)
Dept: VASCULAR SURGERY | Facility: CLINIC | Age: 68
End: 2018-03-14

## 2018-03-14 DIAGNOSIS — Z13.6 ENCOUNTER FOR ABDOMINAL AORTIC ANEURYSM (AAA) SCREENING: Primary | ICD-10-CM

## 2018-03-15 ENCOUNTER — THERAPY VISIT (OUTPATIENT)
Dept: PHYSICAL THERAPY | Facility: CLINIC | Age: 68
End: 2018-03-15
Payer: MEDICARE

## 2018-03-15 DIAGNOSIS — M54.2 CERVICAL PAIN: ICD-10-CM

## 2018-03-15 PROCEDURE — 97110 THERAPEUTIC EXERCISES: CPT | Mod: GP | Performed by: PHYSICAL THERAPIST

## 2018-03-15 PROCEDURE — 97112 NEUROMUSCULAR REEDUCATION: CPT | Mod: GP | Performed by: PHYSICAL THERAPIST

## 2018-03-20 ENCOUNTER — OFFICE VISIT (OUTPATIENT)
Dept: PHARMACY | Facility: CLINIC | Age: 68
End: 2018-03-20
Payer: COMMERCIAL

## 2018-03-20 VITALS
OXYGEN SATURATION: 96 % | WEIGHT: 216.8 LBS | BODY MASS INDEX: 35.8 KG/M2 | SYSTOLIC BLOOD PRESSURE: 116 MMHG | DIASTOLIC BLOOD PRESSURE: 80 MMHG | HEART RATE: 61 BPM

## 2018-03-20 DIAGNOSIS — E55.9 VITAMIN D DEFICIENCY: ICD-10-CM

## 2018-03-20 DIAGNOSIS — E78.5 HYPERLIPIDEMIA LDL GOAL <70: ICD-10-CM

## 2018-03-20 DIAGNOSIS — K21.9 GASTROESOPHAGEAL REFLUX DISEASE WITHOUT ESOPHAGITIS: ICD-10-CM

## 2018-03-20 DIAGNOSIS — F33.0 MILD RECURRENT MAJOR DEPRESSION (H): ICD-10-CM

## 2018-03-20 DIAGNOSIS — I10 HYPERTENSION GOAL BP (BLOOD PRESSURE) < 140/90: Primary | ICD-10-CM

## 2018-03-20 LAB
CHOLEST SERPL-MCNC: 153 MG/DL
HDLC SERPL-MCNC: 19 MG/DL
LDLC SERPL CALC-MCNC: 90 MG/DL
NONHDLC SERPL-MCNC: 134 MG/DL
TRIGL SERPL-MCNC: 220 MG/DL

## 2018-03-20 PROCEDURE — 80061 LIPID PANEL: CPT | Performed by: NURSE PRACTITIONER

## 2018-03-20 PROCEDURE — 99605 MTMS BY PHARM NP 15 MIN: CPT | Performed by: PHARMACIST

## 2018-03-20 PROCEDURE — 36415 COLL VENOUS BLD VENIPUNCTURE: CPT | Performed by: NURSE PRACTITIONER

## 2018-03-20 PROCEDURE — 82306 VITAMIN D 25 HYDROXY: CPT | Performed by: NURSE PRACTITIONER

## 2018-03-20 PROCEDURE — 99607 MTMS BY PHARM ADDL 15 MIN: CPT | Performed by: PHARMACIST

## 2018-03-20 RX ORDER — AMLODIPINE BESYLATE 5 MG/1
5 TABLET ORAL AT BEDTIME
COMMUNITY
End: 2018-06-05 | Stop reason: DRUGHIGH

## 2018-03-20 NOTE — PROGRESS NOTES
SUBJECTIVE/OBJECTIVE:                           Braeden Umana is a 68 year old male coming in for an initial visit for Medication Therapy Management.  He was referred to me from Treva Saleem for Bp issues /med review.     Chief Complaint: He feels wiped out --needs nap middle of day --not entirely new but noticeable worse since some med changes.  Last sept. Psych meds changed --sleeps better -then with bp med changes - fatigue set in .     He asks about sl-nitro pills -- he never carries with him --does he need to ?    Personal Healthcare Goals: Fix fatigue issue.     Allergies/ADRs: Reviewed in Epic  Tobacco: No tobacco use  Alcohol: not currently using  Caffeine: 3 cups/day of coffee  Activity: not very  PMH: Reviewed in Epic    Medication Adherence/Access:  Patient takes medications directly from bottles.  Patient takes medications 1 time(s) per day.all at night before bedtime.   Per patient, misses medication zero times per week.   Medication barriers: none.   The patient fills medications at [place]: NO, fills medications at [facility].         Hypertension: Current medications include atenolol 50mg at hs-admits he only takes 25mg hs due to fatigue issues, amlodipine 5mg hs and losartan 50mg qhs.   Patient does self-monitor BP. Home BP monitoring in range of 135-145's systolic over 70-90's diastolic.  Patient reports the following medication side effects: fatigue--but less now on 25mg atenolol.     Had 1 previous MI in 2016, 19 stents in your heart --started 10 years ago. Does not carry sl nitro with him .       His cards md (dr. Matthias penaloza at [facility]) - took him off beta blockers --then he needs more stents and gets put back on them when in the hopsital--he is confused --does he need one?.  He has not seem dr. penaloza in 12 months.       Bi-polar ;  His psych md would like him off beta-blocker. Makes him too depressed.  Cymbalta 60mg . Hs .   Gabapentin 5y364ov . Hs .       Hyperlipidemia: Current  therapy includes Ezetimibe (Zetia) 10mg once daily.  Pt reports no significant myalgias or other side effects.   Muscle cramps on all statins --still wakes up middle of night with charley horse issue.rubs it out.    Cards md has not discussed repatha /praluent.       He has not had lipids checked in some time --he agreed to non fasting lipid today .     He admits carotid endarterectomy 2 months ago.       Insomnia: Current medications include: quetiapine 12.5mg at bedtime --helps sleep Pt reports trouble staying asleep, can only sleep 4-7 hours, naps from 1-4 hrs. , depression and anxiety. Takes alprazolam at hs .  Psych md - Dr. Barragan .     Acid reflux :  Constant every night - occurs as he is going to sleep --had zantac rx --but ran out not taking them.       Vitamin D3: level was 13 on --takes once weekly 50,000 iu's D3 --did not have his bottle today .      Current labs include:  Today's Vitals: /80  Pulse 61  Wt 216 lb 12.8 oz (98.3 kg)  SpO2 96%  BMI 35.8 kg/m2  BP Readings from Last 3 Encounters:   03/20/18 116/80   02/26/18 133/74   01/29/18 132/85     Lab Results   Component Value Date    A1C 5.7 12/12/2017   .  Lab Results   Component Value Date    CHOL 153 03/20/2018     Lab Results   Component Value Date    TRIG 220 03/20/2018     Lab Results   Component Value Date    HDL 19 03/20/2018     Lab Results   Component Value Date    LDL 90 03/20/2018       Liver Function Studies -   Recent Labs   Lab Test  12/12/17   1411   PROTTOTAL  8.0   ALBUMIN  3.6   BILITOTAL  0.4   ALKPHOS  128   AST  30   ALT  32       No results found for: UCRR, MICROL, UMALCR    Last Basic Metabolic Panel:  Lab Results   Component Value Date     12/12/2017      Lab Results   Component Value Date    POTASSIUM 4.0 12/12/2017     Lab Results   Component Value Date    CHLORIDE 105 12/12/2017     Lab Results   Component Value Date    BUN 21 12/12/2017     Lab Results   Component Value Date    CR 1.09 12/12/2017      GFR Estimate   Date Value Ref Range Status   12/12/2017 67 >60 mL/min/1.7m2 Final     Comment:     Non  GFR Calc   05/27/2016 54 ml/min/1.73m2 Final   02/20/2016 59 ml/min/1.73m2 Final     GFR Estimate If Black   Date Value Ref Range Status   12/12/2017 81 >60 mL/min/1.7m2 Final     Comment:      GFR Calc   05/27/2016 >60 ml/min/1.73m2 Final   02/20/2016 >60 ml/min/1.73m2 Final     TSH   Date Value Ref Range Status   12/12/2017 1.58 0.40 - 4.00 mU/L Final   ]    Most Recent Immunizations   Administered Date(s) Administered     Influenza (High Dose) 3 valent vaccine 11/13/2017     Influenza (IIV3) PF 10/17/2013     Pneumo Conj 13-V (2010&after) 09/15/2016     Pneumococcal 23 valent 05/22/2013     TDAP Vaccine (Adacel) 08/10/2010         ASSESSMENT:                             Current medications were reviewed today.     Medication Adherence: excellent, no issues identified      Hypertension: Stable. Patient is meeting BP goal of < 140/90mmHg.   Pt would benefit from the following changes - see germán dee -what is his BP goal ? Does he need a beta blocker drug ? As this causes fatigue/depression for him --we need cardiology to decide this .          Bi-polar:  Stable for most part - but Duloxetine at hs may be causing some insomnia --try moving to AM dosing now.     Hyperlipidemia: Needs Improvement. Pt is not on any intensity statin which is indicated based on 2013 ACC/AHA guidelines for lipid management.  ldl today is 90 on zetia --what is his ldl goal ? He will see germán dee to ask --may be a good candidate for repatha or praluent ?      Insomnia: needs improvement --stay in seroquel , move Duloxetine to am .    Acid reflux :  Constant every night - occurs as he is going to sleep --had zantac rx --but ran out not taking them. Please restart pm zantac dose now .       Vitamin D3: level was 13 on --takes once weekly 50,000 iu's D3 --lab recheck today --result is pending.        PLAN:                            1. FYI--BP today is 116/80 mmhg. Excellent .  Lets actually try you on the 10mg. amlodipine dose since you have rx for that , stay on 25mg. Atenolol and 50mg losartan daily as well.  Check home blood pressures daily.    Please see your cardiologist Dr. penaloza --discuss with him your BP goal ? Is it <130/80mmhg. or <140/90mmhg?      Also--ask him if you need to be on a beta blocker like your atenolol --this drug does affect your energy/fatigue level and perhaps your bi-polar disorder as well.     2. FYI--Lets have you go to lab today for non-fasting cholesterol --lets see where your LDL is at ?   Stay on daily Zetia --but ask dr. penaloza what your LDL goal is ?  And if not their --would he be ok with you trying a new cholesterol drug called repatha? To reach your LDl goal.    3. FYI--If your not sleeping well -- ok to change Duloxetine to AM dosing . See if that lessens your insomnia?    4. FYI--For your acid reflux --please restart some Pm dose of Ranitidine .     5. Please have your Vitamin D level rechecked today . Watch my chart for results/plan.     6. FYI-Please purchase a nitro key chain and place some pills in their that are fresh (<1 year old)--take with you wherever you go.           Next MTM visit:  Tuesday June 19th at 3pm .     I spent 60 minutes with this patient today. All changes were made via collaborative practice agreement with Treva Saleem. A copy of the visit note was provided to the patient's primary care provider.        The patient was given a summary of these recommendations as an after visit summary.     Kevin Nunez [facility].  Medication Therapy Management Provider  [phone]

## 2018-03-20 NOTE — PATIENT INSTRUCTIONS
Recommendations from today's MTM visit:                                                    MTM (medication therapy management) is a service provided by a clinical pharmacist designed to help you get the most of out of your medicines.   Today we reviewed what your medicines are for, how to know if they are working, that your medicines are safe and how to make your medicine regimen as easy as possible.     1. FYI--BP today is 116/80 mmhg. Excellent .  Lets actually try you on the 10mg. amlodipine dose since you have rx for that , stay on 25mg. Atenolol and 50mg losartan daily as well.  Check home blood pressures daily.    Please see your cardiologist Dr. penaloza --discuss with him your BP goal ? Is it <130/80mmhg. or <140/90mmhg?      Also--ask him if you need to be on a beta blocker like your atenolol --this drug does affect your energy/fatigue level and perhaps your bi-polar disorder as well.     2. FYI--Lets have you go to lab today for non-fasting cholesterol --lets see where your LDL is at ?   Stay on daily Zetia --but ask dr. penaloza what your LDL goal is ?  And if not their --would he be ok with you trying a new cholesterol drug called repatha? To reach your LDl goal.    3. FYI--If your not sleeping well -- ok to change Duloxetine to AM dosing . See if that lessens your insomnia?    4. FYI--For your acid reflux --please restart some Pm dose of Ranitidine .     5. Please have your Vitamin D level rechecked today . Watch my chart for results/plan.     6. FYI-Please purchase a nitro key chain and place some pills in their that are fresh (<1 year old)--take with you wherever you go.           Next MTM visit:  Tuesday June 19th at 3pm .     To schedule another MTM appointment, please call the clinic directly or you may call the MTM scheduling line at [phone] or toll-free at [phone].     My Clinical Pharmacist's contact information:                                                      It was a pleasure  seeing you today!  Please feel free to contact me with any questions or concerns you have.      [facility] Nunez [facility], located [facility].  Medication Therapy Management Provider  [phone]      You may receive a survey about the MTM services you received.  I would appreciate your feedback to help me serve you better in the future. Please fill it out and return it when you can. Your comments will be anonymous.

## 2018-03-20 NOTE — MR AVS SNAPSHOT
After Visit Summary   3/20/2018    Braeden Umana    MRN: [id number]           Patient Information     Date Of Birth          1950        Visit Information        Provider Department      3/20/2018 3:30 PM Kevin Nunez [facility] MTM        Today's Diagnoses     Vitamin D deficiency    -  1      Care Instructions    Recommendations from today's MTM visit:                                                    MTM (medication therapy management) is a service provided by a clinical pharmacist designed to help you get the most of out of your medicines.   Today we reviewed what your medicines are for, how to know if they are working, that your medicines are safe and how to make your medicine regimen as easy as possible.     1. FYI--BP today is 116/80 mmhg. Excellent .  Lets actually try you on the 10mg. amlodipine dose since you have rx for that , stay on 25mg. Atenolol and 50mg losartan daily as well.  Check home blood pressures daily.    Please see your cardiologist Dr. penaloza --discuss with him your BP goal ? Is it <130/80mmhg. or <140/90mmhg?      Also--ask him if you need to be on a beta blocker like your atenolol --this drug does affect your energy/fatigue level and perhaps your bi-polar disorder as well.     2. FYI--Lets have you go to lab today for non-fasting cholesterol --lets see where your LDL is at ?   Stay on daily Zetia --but ask dr. penaloza what your LDL goal is ?  And if not their --would he be ok with you trying a new cholesterol drug called repatha? To reach your LDl goal.    3. FYI--If your not sleeping well -- ok to change Duloxetine to AM dosing . See if that lessens your insomnia?    4. FYI--For your acid reflux --please restart some Pm dose of Ranitidine .     5. Please have your Vitamin D level rechecked today . Watch my chart for results/plan.     6. FYI-Please purchase a nitro key chain and place some pills in their that are fresh (<1 year old)--take  with you wherever you go.           Next MTM visit:  Tuesday June 19th at 3pm .     To schedule another MTM appointment, please call the clinic directly or you may call the MTM scheduling line at [phone] or toll-free at [phone].     My Clinical Pharmacist's contact information:                                                      It was a pleasure seeing you today!  Please feel free to contact me with any questions or concerns you have.      Kevin Nunez Rp.  Medication Therapy Management Provider  [phone]      You may receive a survey about the MT services you received.  I would appreciate your feedback to help me serve you better in the future. Please fill it out and return it when you can. Your comments will be anonymous.                    Follow-ups after your visit        Your next 10 appointments already scheduled     Mar 22, 2018  4:00 PM CDT   SHERWIN Spine with Bari Talbot PT   [facility] Physical [facility] ([facility]  )    [address] [place] [id number] [phone]            Mar 28, 2018  2:30 PM CDT   Return Visit with Josue Clements MD   [facility] ([facility])    [address] [place] [address] [phone]            Mar 29, 2018  4:00 PM CDT   SHERWIN Spine with Bari Talbot PT   [facility] ([facility]  )    [address] [place] [address] [phone]            Jun 19, 2018  3:00 PM CDT   SHORT with Kevin Nunez RPH   [facility] ([facility])    [address] [place] [address] [phone]              Future tests that were ordered for you today     Open Future Orders        Priority Expected Expires Ordered    Vitamin D Deficiency Routine  10/20/2018 3/20/2018            Who to contact     If you have questions or  need follow up information about today's clinic visit or your schedule please contact [facility] MT directly at [phone].  Normal or non-critical lab and imaging results will be communicated to you by MyChart, letter or phone within 4 business days after the clinic has received the results. If you do not hear from us within 7 days, please contact the clinic through [employer] or phone. If you have a critical or abnormal lab result, we will notify you by phone as soon as possible.  Submit refill requests through Veniti or call your pharmacy and they will forward the refill request to us. Please allow 3 business days for your refill to be completed.          Additional Information About Your Visit        [employer] Information     [employer] gives you secure access to your electronic health record. If you see a primary care provider, you can also send messages to your care team and make appointments. If you have questions, please call your primary care clinic.  If you do not have a primary care provider, please call [phone] and they will assist you.        Care EveryWhere ID     This is your Care EveryWhere ID. This could be used by other organizations to access your [facility] medical records  [id number]        Your Vitals Were     Pulse Pulse Oximetry BMI (Body Mass Index)             61 96% 35.8 kg/m2          Blood Pressure from Last 3 Encounters:   03/20/18 116/80   02/26/18 133/74   01/29/18 132/85    Weight from Last 3 Encounters:   03/20/18 216 lb 12.8 oz (98.3 kg)   02/26/18 216 lb (98 kg)   01/29/18 214 lb 8 oz (97.3 kg)                 Today's Medication Changes          These changes are accurate as of 3/20/18  4:43 PM.  If you have any questions, ask your nurse or doctor.               These medicines have changed or have updated prescriptions.        Dose/Directions    atenolol 50 MG tablet   Commonly known as:  TENORMIN   Indication:  2 daily   This may have changed:  how much to  take   Used for:  Hypertension goal BP (blood pressure) < 140/90        Dose:  50 mg   Take 1 tablet (50 mg) by mouth daily   Quantity:  135 tablet   Refills:  0                Primary Care Provider Office Phone # Fax #    Treva SaleemLINDA [phone] [address] [facility] [place]  [facility] [address]        Equal Access to Services     [facility]: Hadii aad ku hadasho Soomaali, waaxda luqadaha, qaybta kaalmada adeegyada, waxay idiin hayaan adeeg kharash la'aan . So [facility] [phone].    ATENCIÓN: Si habla español, tiene a guajardo disposición servicios gratuitos de asistencia lingüística. Llame al [phone].    We comply with applicable federal civil rights laws and [place] laws. We do not discriminate on the basis of race, color, national origin, age, disability, sex, sexual orientation, or gender identity.            Thank you!     Thank you for choosing [facility]  for your care. Our goal is always to provide you with excellent care. Hearing back from our patients is one way we can continue to improve our services. Please take a few minutes to complete the written survey that you may receive in the mail after your visit with us. Thank you!             Your Updated Medication List - Protect others around you: Learn how to safely use, store and throw away your medicines at www.disposemymeds.org.          This list is accurate as of 3/20/18  4:43 PM.  Always use your most recent med list.                   Brand Name Dispense Instructions for use Diagnosis    ALPRAZolam 0.25 MG tablet    XANAX     Take 0.25 mg by mouth 3 times daily as needed.        * amLODIPine 5 MG tablet    NORVASC     Take 5 mg by mouth At Bedtime        * amLODIPine 10 MG tablet    NORVASC    90 tablet    Take 1 tablet (10 mg) by mouth daily    Hypertension goal BP (blood pressure) < 140/90       aspirin 81 MG EC tablet     90 tablet    Take 1 tablet (81 mg) by mouth daily        atenolol 50 MG tablet     TENORMIN    135 tablet    Take 1 tablet (50 mg) by mouth daily    Hypertension goal BP (blood pressure) < 140/90       BENADRYL 25 MG tablet   Generic drug:  diphenhydrAMINE      Take 25 mg by mouth every 6 hours as needed.        CYMBALTA 60 MG EC capsule   Generic drug:  DULoxetine     0    1 CAPSULE ONCE DAILY        ezetimibe 10 MG tablet    ZETIA    90 tablet    Take 1 tablet (10 mg) by mouth daily    Hyperlipidemia LDL goal <70       losartan 50 MG tablet    COZAAR    90 tablet    Take 1 tablet (50 mg) by mouth daily    Hypertension goal BP (blood pressure) < 140/90       NEURONTIN 300 MG capsule   Generic drug:  gabapentin     90 capsule    Take 300 mg by mouth daily Take 3 tablets daily        nitroGLYcerin 0.4 MG sublingual tablet    NITROSTAT    1 tablet    Place 0.4 mg under the tongue every 5 minutes as needed for chest pain Reported on 5/22/2017        PLAVIX 75 MG tablet   Generic drug:  clopidogrel     3 MONTHS    ONE DAILY        QUEtiapine 25 MG tablet    SEROquel     Take 25 mg by mouth Take 1 to 2 tablets by mouth at bedtime    Diplopia, Memory loss, Fatigue, unspecified type       ranitidine 150 MG tablet    ZANTAC    60 tablet    Take 1 tablet (150 mg) by mouth 2 times daily    Gastroesophageal reflux disease, esophagitis presence not specified       STATIN NOT PRESCRIBED (INTENTIONAL)      Please choose reason not prescribed, below    Atherosclerosis of coronary artery of native heart, angina presence unspecified, unspecified vessel or lesion type       * Notice:  This list has 2 medication(s) that are the same as other medications prescribed for you. Read the directions carefully, and ask your doctor or other care provider to review them with you.

## 2018-03-21 LAB — DEPRECATED CALCIDIOL+CALCIFEROL SERPL-MC: 71 UG/L (ref 20–75)

## 2018-03-22 ENCOUNTER — THERAPY VISIT (OUTPATIENT)
Dept: PHYSICAL THERAPY | Facility: CLINIC | Age: 68
End: 2018-03-22
Payer: MEDICARE

## 2018-03-22 DIAGNOSIS — M54.2 CERVICAL PAIN: ICD-10-CM

## 2018-03-22 DIAGNOSIS — M25.512 SHOULDER PAIN, LEFT: Primary | ICD-10-CM

## 2018-03-22 PROCEDURE — 97110 THERAPEUTIC EXERCISES: CPT | Mod: GP | Performed by: PHYSICAL THERAPIST

## 2018-03-22 PROCEDURE — 97112 NEUROMUSCULAR REEDUCATION: CPT | Mod: GP | Performed by: PHYSICAL THERAPIST

## 2018-03-22 NOTE — MR AVS SNAPSHOT
After Visit Summary   3/22/2018    Braeden Umana    MRN: [id number]           Patient Information     Date Of Birth          1950        Visit Information        Provider Department      3/22/2018 4:00 PM Bari Talbot PT [facility] Physical Therapy        Today's Diagnoses     Shoulder pain, left    -  1    Cervical pain           Follow-ups after your visit        Your next 10 appointments already scheduled     Mar 28, 2018  2:30 PM CDT   Return Visit with Josue Clements MD   [facility] ([facility])    [address] [place] [address]   [phone]            Mar 29, 2018  4:00 PM CDT   SHERWIN Spine with Bari Talbot PT   [facility] Therapy ([facility]  )    [address] [place] [address] [phone]            Jun 19, 2018  3:00 PM CDT   [facility]    [address] [place] [address] [phone]              Who to contact     If you have questions or need follow up information about today's clinic visit or your schedule please contact [facility] PHYSICAL THERAPY directly at [phone].  Normal or non-critical lab and imaging results will be communicated to you by MyChart, letter or phone within 4 business days after the clinic has received the results. If you do not hear from us within 7 days, please contact the clinic through MyChart or phone. If you have a critical or abnormal lab result, we will notify you by phone as soon as possible.  Submit refill requests through [employer] or call your pharmacy and they will forward the refill request to us. Please allow 3 business days for your refill to be completed.          Additional Information About Your Visit        [facility]  Information     [employer] gives you secure access to your electronic health record. If you see a primary care provider, you can also send messages to your care team and make appointments. If you have questions, please call your primary care clinic.  If you do not have a primary care provider, please call [phone] and they will assist you.        Care EveryWhere ID     This is your Care EveryWhere ID. This could be used by other organizations to access your [facility] medical records  [id number]         Blood Pressure from Last 3 Encounters:   03/20/18 116/80   02/26/18 133/74   01/29/18 132/85    Weight from Last 3 Encounters:   03/20/18 98.3 kg (216 lb 12.8 oz)   02/26/18 98 kg (216 lb)   01/29/18 97.3 kg (214 lb 8 oz)              We Performed the Following     NEUROMUSCULAR RE-EDUCATION     THERAPEUTIC EXERCISES          Today's Medication Changes          These changes are accurate as of 3/22/18 11:59 PM.  If you have any questions, ask your nurse or doctor.               These medicines have changed or have updated prescriptions.        Dose/Directions    atenolol 50 MG tablet   Commonly known as:  TENORMIN   Indication:  2 daily   This may have changed:  how much to take   Used for:  Hypertension goal BP (blood pressure) < 140/90        Dose:  50 mg   Take 1 tablet (50 mg) by mouth daily   Quantity:  135 tablet   Refills:  0                Primary Care Provider Office Phone # Fax #    Treva PETEY Saleem [phone] [address] [facility] [address]        Equal Access to Services     [facility]: Hadii aad ku hadasho Soomaali, waaxda luqadaha, qaybta kaalmada adeegyada, didi gan . So [facility] [phone].    ATENCIÓN: Si habla español, tiene a guajardo disposición servicios gratuitos de asistencia lingüística. Llame al [phone].    We comply with applicable federal civil rights laws and [place] laws. We do not discriminate on the basis of race,  color, national origin, age, disability, sex, sexual orientation, or gender identity.            Thank you!     Thank you for choosing [facility]ICAL THER[facility]  for your care. Our goal is always to provide you with excellent care. Hearing back from our patients is one way we can continue to improve our services. Please take a few minutes to complete the written survey that you may receive in the mail after your visit with us. Thank you!             Your Updated Medication List - Protect others around you: Learn how to safely use, store and throw away your medicines at www.disposemymeds.org.          This list is accurate as of 3/22/18 11:59 PM.  Always use your most recent med list.                   Brand Name Dispense Instructions for use Diagnosis    ALPRAZolam 0.25 MG tablet    XANAX     Take 0.25 mg by mouth 3 times daily as needed.        * amLODIPine 5 MG tablet    NORVASC     Take 5 mg by mouth At Bedtime        * amLODIPine 10 MG tablet    NORVASC    90 tablet    Take 1 tablet (10 mg) by mouth daily    Hypertension goal BP (blood pressure) < 140/90       aspirin 81 MG EC tablet     90 tablet    Take 1 tablet (81 mg) by mouth daily        atenolol 50 MG tablet    TENORMIN    135 tablet    Take 1 tablet (50 mg) by mouth daily    Hypertension goal BP (blood pressure) < 140/90       BENADRYL 25 MG tablet   Generic drug:  diphenhydrAMINE      Take 25 mg by mouth every 6 hours as needed.        CYMBALTA 60 MG EC capsule   Generic drug:  DULoxetine     0    1 CAPSULE ONCE DAILY        ezetimibe 10 MG tablet    ZETIA    90 tablet    Take 1 tablet (10 mg) by mouth daily    Hyperlipidemia LDL goal <70       losartan 50 MG tablet    COZAAR    90 tablet    Take 1 tablet (50 mg) by mouth daily    Hypertension goal BP (blood pressure) < 140/90       NEURONTIN 300 MG capsule   Generic drug:  gabapentin     90 capsule    Take 300 mg by mouth daily Take 3 tablets daily         nitroGLYcerin 0.4 MG sublingual tablet    NITROSTAT    1 tablet    Place 0.4 mg under the tongue every 5 minutes as needed for chest pain Reported on 5/22/2017        PLAVIX 75 MG tablet   Generic drug:  clopidogrel     3 MONTHS    ONE DAILY        QUEtiapine 25 MG tablet    SEROquel     Take 25 mg by mouth Take 1 to 2 tablets by mouth at bedtime    Diplopia, Memory loss, Fatigue, unspecified type       ranitidine 150 MG tablet    ZANTAC    60 tablet    Take 1 tablet (150 mg) by mouth 2 times daily    Gastroesophageal reflux disease, esophagitis presence not specified       STATIN NOT PRESCRIBED (INTENTIONAL)      Please choose reason not prescribed, below    Atherosclerosis of coronary artery of native heart, angina presence unspecified, unspecified vessel or lesion type       * Notice:  This list has 2 medication(s) that are the same as other medications prescribed for you. Read the directions carefully, and ask your doctor or other care provider to review them with you.

## 2018-03-23 NOTE — PROGRESS NOTES
Subjective:  HPI                    Objective:  System    Physical Exam    General     ROS    Assessment/Plan:    PROGRESS  REPORT    Progress reporting period is from 3/1/18 to 3/22/18. 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 Primary pain location identified at L upper trapezius with radiation into medial scapula and lateral shoulder with intermittent radiating sxs into L lateral hand.     Patient reports moderate improvement in his initial cervical pain with improved cervical ROM, however patient still presents with a considerable loss of L shoulder AROM elevation due to scapular muscle spasms. Key Examination findings reveal painful loss of L shoulder AROM Abduction/flexion, WNL L shoulder PROM, Abduction weakness, normal RTC strength, L 1st rib restriction, and painfree cervical AROM.     Pt to f/u with neuro for further evaluation of potential nerve entrapment        SUBJECTIVE  Subjective: Pt reports mild improvements since last visit, but still has considerable L upper trapezius and medial scapular pain with shoulder elevation. Pt reports adherence to HEP    Current Pain level: 6/10.     Initial Pain level: 8/10.   Changes in function:  Yes (See Goal flowsheet attached for changes in current functional level)  Adverse reaction to treatment or activity: None    OBJECTIVE  Changes noted in objective findings:  Yes,     L shoulder AROM  Flexion: 90 deg, pain +++ at superior scapula  Abduction: 70 deg, pain +++ at L upper trapezius and lateral shoulder  ER: WNL  IR: WNL    L shoulder abduction MMT: 3-/5 (pain>weakness).     L shoulder PROM:   Flexion: 160 deg  Abduction: 140 deg      Painfree Cervical AROM in all directions    Cervical Myotomes  C6-T1 WNL    Recommed f/u with neuro     ASSESSMENT/PLAN  Updated problem list and treatment plan: Diagnosis 1:  Cervical and L shoulder pain     STG/LTGs have been met or progress has been made towards goals:  Yes (See Goal flow sheet completed today.)  Assessment of Progress: The patient's progress has slowed.  Self Management Plans:  Patient has been instructed in a home treatment program.  Patient is independent in a home treatment program.  I have re-evaluated this patient and find that the nature, scope, duration and intensity of the therapy is appropriate for the medical condition of the patient.  Braeden continues to require the following intervention to meet STG and LTG's:  PT    Recommendations:  This patient would benefit from further evaluation.    Please refer to the daily flowsheet for treatment today, total treatment time and time spent performing 1:1 timed codes.

## 2018-03-29 ENCOUNTER — THERAPY VISIT (OUTPATIENT)
Dept: PHYSICAL THERAPY | Facility: CLINIC | Age: 68
End: 2018-03-29
Payer: MEDICARE

## 2018-03-29 ENCOUNTER — OFFICE VISIT (OUTPATIENT)
Dept: NEUROLOGY | Facility: CLINIC | Age: 68
End: 2018-03-29
Payer: COMMERCIAL

## 2018-03-29 VITALS
SYSTOLIC BLOOD PRESSURE: 122 MMHG | OXYGEN SATURATION: 96 % | TEMPERATURE: 98.4 F | HEART RATE: 73 BPM | WEIGHT: 221.4 LBS | BODY MASS INDEX: 36.56 KG/M2 | RESPIRATION RATE: 16 BRPM | DIASTOLIC BLOOD PRESSURE: 64 MMHG

## 2018-03-29 DIAGNOSIS — R41.3 MEMORY DIFFICULTY: ICD-10-CM

## 2018-03-29 DIAGNOSIS — M25.512 SHOULDER PAIN, LEFT: ICD-10-CM

## 2018-03-29 DIAGNOSIS — M54.2 CERVICAL PAIN: ICD-10-CM

## 2018-03-29 DIAGNOSIS — G52.8 SPINAL ACCESSORY NEUROPATHY: Primary | ICD-10-CM

## 2018-03-29 PROCEDURE — 99215 OFFICE O/P EST HI 40 MIN: CPT | Performed by: PSYCHIATRY & NEUROLOGY

## 2018-03-29 PROCEDURE — 97140 MANUAL THERAPY 1/> REGIONS: CPT | Mod: GP | Performed by: PHYSICAL THERAPIST

## 2018-03-29 PROCEDURE — 97110 THERAPEUTIC EXERCISES: CPT | Mod: GP | Performed by: PHYSICAL THERAPIST

## 2018-03-29 NOTE — PROGRESS NOTES
ESTABLISHED PATIENT NEUROLOGY NOTE    DATE OF VISIT: 3/29/2018  CLINIC LOCATION: [facility]  MRN: [id number]  PATIENT NAME: Braeden Umana  YOB: 1950    PCP: Treva Saleem NP    REASON FOR VISIT:   Chief Complaint   Patient presents with     Consult     nerve damage      SUBJECTIVE:                                                      HISTORY OF PRESENT ILLNESS: Patient presents with a new problem, left shoulder weakness and pain following his left carotid surgery.  The patient was previously seen for memory difficulty on 12/27/2017.  Please refer to my initial note for further information.    Per patient's report, he underwent left carotid endarterectomy on 01/23/2018.  It lasted approximately 4.5 hours.  During postop period, the patient developed trapezius muscle tightness, and left shoulder pain and weakness.  He reports limited abduction of the left arm and difficulty with shoulder shrug.  He denies any additional focal weakness.  Intermittently, he experiences mostly tingling with some mild numbness over the palmar surface of the left hand and over the dorsal surface of third and fourth fingers.  This only occurs after prolonged use of left arm while exercising.  He did not notice any other aggravating or alleviating factors.  He underwent physical therapy which helped his symptoms.  He feels that his strength is returning.  His physical therapist suggested for him to see a neurologist.    Regarding his memory concerns, the patient completed occupational therapy evaluation.  His CPT was 5.5/5.6, consistent with normal functioning.  He did not complete neuropsychologic evaluation as recommended.  He denies any perceivable worsening of his cognitive complaints.  He plans to complete neuropsychologic evaluation in the nearest future.    On review of systems, patient endorses no other active complaints.  Medications, allergies, family and social history were also  reviewed.  There are no changes reported by patient.    CURRENT MEDICATIONS:   Current Outpatient Prescriptions   Medication     amLODIPine (NORVASC) 5 MG tablet     atenolol (TENORMIN) 50 MG tablet     losartan (COZAAR) 50 MG tablet     aspirin 81 MG EC tablet     ezetimibe (ZETIA) 10 MG tablet     ranitidine (ZANTAC) 150 MG tablet     QUEtiapine (SEROQUEL) 25 MG tablet     STATIN NOT PRESCRIBED, INTENTIONAL,     amLODIPine (NORVASC) 10 MG tablet     gabapentin (NEURONTIN) 300 MG capsule     ALPRAZolam (XANAX) 0.25 MG tablet     diphenhydrAMINE (BENADRYL) 25 MG tablet     nitroglycerin (NITROSTAT) 0.4 MG SL tablet     CYMBALTA 60 MG OR CPEP     PLAVIX 75 MG OR TABS     REVIEW OF SYSTEMS:                                                    10-system review was completed. Pertinent positives are included in HPI. The remainder of ROS is negative.  EXAM:                                                    Physical Exam:   Vitals: /64 (BP Location: Left arm, Patient Position: Sitting, Cuff Size: Adult Regular)  Pulse 73  Temp 98.4  F (36.9  C) (Oral)  Resp 16  Wt 100.4 kg (221 lb 6.4 oz)  SpO2 96%  BMI 36.56 kg/m2    General: pt is in NAD, cooperative.  Skin: normal turgor, moist mucous membranes, no lesions/rashes noticed.  HEENT: ATNC, white sclera, normal conjunctiva.  Respiratory: Symmetric lung excursion, no accessory respiratory muscle use.  Abdomen: Non distended.  Neurological: awake, cooperative, follows commands, no aphasia or dysarthria noted, cranial nerves II-XII: no ptosis, extraocular motility is full, face is symmetric, shoulder shrug and arm abduction are mildly weak on the left (4/5), no visible muscle atrophy of trapezius muscles bilaterally, sternocleidomastoid muscle testing is normal, tongue is midline, equally moves all extremities, pinprick sensation is normal in both upper extremities, no dysmetria bilaterally, casual gait is normal.    DATA:     Labs: I personally reviewed the  following labs:  Orders Only on 03/20/2018   Component Date Value Ref Range Status     Cholesterol 03/20/2018 153  <200 mg/dL Final     Triglycerides 03/20/2018 220* <150 mg/dL Final    Comment: Borderline high:  150-199 mg/dl  High:             200-499 mg/dl  Very high:       >499 mg/dl  Non Fasting       HDL Cholesterol 03/20/2018 19* >39 mg/dL Final     LDL Cholesterol Calculated 03/20/2018 90  <100 mg/dL Final    Desirable:       <100 mg/dl     Non HDL Cholesterol 03/20/2018 134* <130 mg/dL Final    Comment: Above Desirable:  130-159 mg/dl  Borderline high:  160-189 mg/dl  High:             190-219 mg/dl  Very high:       >219 mg/dl       Vitamin D Deficiency screening 03/20/2018 71  20 - 75 ug/L Final   I also reviewed CPT from 01/05/2018, as detailed in the history of present illness.    ASSESSMENT and PLAN:                                                    Assessment: 68-year-old male patient previously seen for memory concerns and bilateral carotid artery stenosis presents with a new problem, left shoulder pain and weakness developed after left carotid endarterectomy on 01/23/2018.  The patient reports improvement of his symptoms with physical therapy.    We had a detailed discussion with the patient regarding his symptoms.  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  The other less likely differential also includes axillary nerve injury, brachial plexopathy, cervical radiculopathy, and other focal neuropathies.  I ordered EMG to clarify.  He should continue physical therapy.  The reported improvement in his trapezius muscle strength is a good prognostic sign for partial or full recovery from this injury.  I reviewed with the patient that full recovery might take up to 3-4 months and in interim time that he should continue physical therapy, targeted at his trapezius muscle.    The rest of our discussion and the plan are summarized below.    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 Memory difficulty R41.3      Plan: At today's visit we discussed various diagnostic possibilities for your symptoms and the reasons for work-up, which includes:  Orders Placed This Encounter   Procedures     EMG     For the memory concerns, I advised the patient to complete a recommended neuropsychological evaluation.    Additional recommendations after the work-up.    Next follow-up appointment is in the next 3 months or earlier if needed.    I encouraged the patient to call me with any questions or concerns.    Total Time: 43 minutes with > 50% spent counseling the patient on stated above assessment and recommendations, including nature of the diagnosis, needed w/u, proposed plan of treatment, and prognosis.    Josue Clements MD  HP/[facility] Neurology

## 2018-03-29 NOTE — NURSING NOTE
"Chief Complaint   Patient presents with     Consult     nerve damage        Initial /64 (BP Location: Left arm, Patient Position: Sitting, Cuff Size: Adult Regular)  Pulse 73  Temp 98.4  F (36.9  C) (Oral)  Resp 16  Wt 100.4 kg (221 lb 6.4 oz)  SpO2 96%  BMI 36.56 kg/m2 Estimated body mass index is 36.56 kg/(m^2) as calculated from the following:    Height as of 1/17/18: 1.657 m (5' 5.25\").    Weight as of this encounter: 100.4 kg (221 lb 6.4 oz).  Medication Reconciliation: complete     Brianna Charlton MA      "

## 2018-03-29 NOTE — PATIENT INSTRUCTIONS
AFTER VISIT SUMMARY (AVS):    At today's visit we discussed various diagnostic possibilities for your symptoms and the reasons for work-up, which includes:  Orders Placed This Encounter   Procedures     EMG     For the memory concerns, please complete a recommended neuropsychological evaluation.    Additional recommendations after the work-up.    Next follow-up appointment is in the next 3 months or earlier if needed.    Please do not hesitate to call me with any questions or concerns.    Thanks.

## 2018-03-29 NOTE — MR AVS SNAPSHOT
After Visit Summary   3/29/2018    Braeden Umana    MRN: [id number]           Patient Information     Date Of Birth          1950        Visit Information        Provider Department      3/29/2018 9:30 AM Josue Clements MD [facility]        Today's Diagnoses     Spinal accessory neuropathy    -  1    Memory difficulty          Care Instructions    AFTER VISIT SUMMARY (AVS):    At today's visit we discussed various diagnostic possibilities for your symptoms and the reasons for work-up, which includes:  Orders Placed This Encounter   Procedures     EMG     For the memory concerns, please complete a recommended neuropsychological evaluation.    Additional recommendations after the work-up.    Next follow-up appointment is in the next 3 months or earlier if needed.    Please do not hesitate to call me with any questions or concerns.    Thanks.          Follow-ups after your visit        Follow-up notes from your care team     Return in about 3 months (around 6/29/2018).      Your next 10 appointments already scheduled     Mar 29, 2018  4:00 PM CDT   SHERWIN Spine with Bari Talbot, PT   [facility] ([facility]  )    [address] [place] [address]   [phone]            Jun 19, 2018  3:00 PM CDT   SHORT with Kevin Nunez RPH   [facility] ([facility])    [address] [place] [address]   [phone]              Future tests that were ordered for you today     Open Future Orders        Priority Expected Expires Ordered    EMG Routine  3/29/2019 3/29/2018            Who to contact     If you have questions or need follow up information about today's clinic visit or your schedule please contact [facility] directly at [phone].  Normal or non-critical lab and imaging results will be communicated to you by  MyChart, letter or phone within 4 business days after the clinic has received the results. If you do not hear from us within 7 days, please contact the clinic through [employer] or phone. If you have a critical or abnormal lab result, we will notify you by phone as soon as possible.  Submit refill requests through [employer] or call your pharmacy and they will forward the refill request to us. Please allow 3 business days for your refill to be completed.          Additional Information About Your Visit        [employer] Information     [employer] gives you secure access to your electronic health record. If you see a primary care provider, you can also send messages to your care team and make appointments. If you have questions, please call your primary care clinic.  If you do not have a primary care provider, please call [phone] and they will assist you.        Care EveryWhere ID     This is your Care EveryWhere ID. This could be used by other organizations to access your [facility] medical records  [id number]        Your Vitals Were     Pulse Temperature Respirations Pulse Oximetry BMI (Body Mass Index)       73 98.4  F (36.9  C) (Oral) 16 96% 36.56 kg/m2        Blood Pressure from Last 3 Encounters:   03/29/18 122/64   03/20/18 116/80   02/26/18 133/74    Weight from Last 3 Encounters:   03/29/18 100.4 kg (221 lb 6.4 oz)   03/20/18 98.3 kg (216 lb 12.8 oz)   02/26/18 98 kg (216 lb)                 Today's Medication Changes          These changes are accurate as of 3/29/18 10:12 AM.  If you have any questions, ask your nurse or doctor.               These medicines have changed or have updated prescriptions.        Dose/Directions    atenolol 50 MG tablet   Commonly known as:  TENORMIN   Indication:  2 daily   This may have changed:  how much to take   Used for:  Hypertension goal BP (blood pressure) < 140/90        Dose:  50 mg   Take 1 tablet (50 mg) by mouth daily   Quantity:  135 tablet   Refills:  0                 Primary Care Provider Office Phone # Fax #    Treva ANGELO LINDA Saleem [phone] [address] [facility] [place]  [facility] [address]        Equal Access to Services     STEVIE BUSTAMANTE : Hadii aad ku hadtereo Soomaali, waaxda luqadaha, qaybta kaalmada adeegyada, didi arauzn ambrose polancotiera antoine. So [facility] [phone].    ATENCIÓN: Si habla español, tiene a guajardo disposición servicios gratuitos de asistencia lingüística. Llame al [phone].    We comply with applicable federal civil rights laws and [place] laws. We do not discriminate on the basis of race, color, national origin, age, disability, sex, sexual orientation, or gender identity.            Thank you!     Thank you for choosing [facility]  for your care. Our goal is always to provide you with excellent care. Hearing back from our patients is one way we can continue to improve our services. Please take a few minutes to complete the written survey that you may receive in the mail after your visit with us. Thank you!             Your Updated Medication List - Protect others around you: Learn how to safely use, store and throw away your medicines at www.disposemymeds.org.          This list is accurate as of 3/29/18 10:12 AM.  Always use your most recent med list.                   Brand Name Dispense Instructions for use Diagnosis    ALPRAZolam 0.25 MG tablet    XANAX     Take 0.25 mg by mouth 3 times daily as needed.        * amLODIPine 5 MG tablet    NORVASC     Take 5 mg by mouth At Bedtime        * amLODIPine 10 MG tablet    NORVASC    90 tablet    Take 1 tablet (10 mg) by mouth daily    Hypertension goal BP (blood pressure) < 140/90       aspirin 81 MG EC tablet     90 tablet    Take 1 tablet (81 mg) by mouth daily        atenolol 50 MG tablet    TENORMIN    135 tablet    Take 1 tablet (50 mg) by mouth daily    Hypertension goal BP (blood pressure) < 140/90       BENADRYL 25 MG tablet   Generic drug:  diphenhydrAMINE       Take 25 mg by mouth every 6 hours as needed.        CYMBALTA 60 MG EC capsule   Generic drug:  DULoxetine     0    1 CAPSULE ONCE DAILY        ezetimibe 10 MG tablet    ZETIA    90 tablet    Take 1 tablet (10 mg) by mouth daily    Hyperlipidemia LDL goal <70       losartan 50 MG tablet    COZAAR    90 tablet    Take 1 tablet (50 mg) by mouth daily    Hypertension goal BP (blood pressure) < 140/90       NEURONTIN 300 MG capsule   Generic drug:  gabapentin     90 capsule    Take 300 mg by mouth daily Take 3 tablets daily        nitroGLYcerin 0.4 MG sublingual tablet    NITROSTAT    1 tablet    Place 0.4 mg under the tongue every 5 minutes as needed for chest pain Reported on 5/22/2017        PLAVIX 75 MG tablet   Generic drug:  clopidogrel     3 MONTHS    ONE DAILY        QUEtiapine 25 MG tablet    SEROquel     Take 25 mg by mouth Take 1 to 2 tablets by mouth at bedtime    Diplopia, Memory loss, Fatigue, unspecified type       ranitidine 150 MG tablet    ZANTAC    60 tablet    Take 1 tablet (150 mg) by mouth 2 times daily    Gastroesophageal reflux disease, esophagitis presence not specified       STATIN NOT PRESCRIBED (INTENTIONAL)      Please choose reason not prescribed, below    Atherosclerosis of coronary artery of native heart, angina presence unspecified, unspecified vessel or lesion type       * Notice:  This list has 2 medication(s) that are the same as other medications prescribed for you. Read the directions carefully, and ask your doctor or other care provider to review them with you.

## 2018-04-05 ENCOUNTER — MYC MEDICAL ADVICE (OUTPATIENT)
Dept: FAMILY MEDICINE | Facility: CLINIC | Age: 68
End: 2018-04-05

## 2018-04-11 ENCOUNTER — OFFICE VISIT (OUTPATIENT)
Dept: NEUROLOGY | Facility: CLINIC | Age: 68
End: 2018-04-11

## 2018-04-11 DIAGNOSIS — G52.8 SPINAL ACCESSORY NEUROPATHY: ICD-10-CM

## 2018-04-11 NOTE — PROGRESS NOTES
[facility]  Electrodiagnostic Laboratory    Nerve Conduction & EMG Report          Patient:       Braeden Umana  Patient ID:    [id number]  Gender:        Male  YOB: 1950  Age:           68 Years 1 Months        History & Examination:  68 year old with left shoulder weakness after carotid endarterectomy 18. Eval for focal neuropathy vs plexopathy vs radiculopathy.     Techniques: Motor and sensory conduction studies were done with surface recording electrodes. EMG was done with a concentric needle electrode.      Results:  Nerve conduction studies:  1. Left median-D2, ulnar-D5, and radial sensory responses were normal.   2. Left median-APB and ulnar-ADM motor responses were normal.   3. Left spinal-accessory-trap motor response amplitude is reduced compared to the right.     Needle EM. Fibrillation potentials and positive sharp waves were seen in the left trapezius muscle.   2. Long duration motor unit potentials (MUP) with increased polyphasia and reduced recruitment were seen in the left trapezius muscle.     Interpretation:  This is an abnormal study. 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 The presence of motor unit remodeling in the trapezius muscle is a finding indicating early reinnervation (i.e., repair) after previous denervation. There is no evidence of a widespread cervical radiculopathy or brachial plexopathy on the basis of this study. Clinical correlation is recommended.    Sukhjinder Figueroa MD  Department of Neurology         Sensory NCS      Nerve / Sites Rec. Site Onset Peak Ref. NP Amp Ref. PP Amp Dist Jonathan Ref. Temp     ms ms ms  V  V  V cm m/s m/s  C   L MEDIAN - Dig II Anti      Wrist Dig II 2.86 4.22  17.9 10.0 26.4 14 48.9 48.0 34   L ULNAR - Dig V Anti      Wrist Dig V 2.29 3.02  17.3 8.0 24.6 12.5 54.5 48.0 33.6   L RADIAL - Snuff      Forearm Snuff 2.08 2.66  17.6 15.0 27.0 12.5 60.0 48.0 33.9        Motor NCS      Nerve / Sites Rec. Site Lat Ref. Amp Ref. Rel Amp Dist Jonathan Ref. Dur. Area Temp.     ms ms mV mV % cm m/s m/s ms %  C   L MEDIAN - APB      Wrist APB 3.96 4.40 11.0 5.0 100 8   5.47 100 33.8      Elbow APB 7.71  9.8  89.2 22 58.7 48.0 6.51 92 33.9   L ULNAR - ADM      Wrist ADM 2.97 3.50 10.1 5.0 100 8   6.56 100 33.6      B.Elbow ADM 6.72  9.0  88.8 21 56.0 48.0 6.51 90 33.4      A.Elbow ADM 8.65  8.9  87.7 10 51.9 48.0 7.03 88 33.3   L SPINAL ACCESS - Trapezius      Neck Trapezius 2.81  0.1  100    11.35  28.4   R SPINAL ACCESS - Trapezius      Neck Trapezius 3.13  1.9  100    10.68 100 28.9       F  Wave      Nerve Min F Lat Max F Lat Mean FLat Temp.    ms ms ms  C   L ULNAR 29.32 31.15 30.49 33.1       EMG Summary Table     Spontaneous MUAP Recruitment    IA Fib PSW Fasc H.F. Amp Dur. PPP Pattern   L. DELTOID N None None None None N N N N   L. BICEPS N None None None None N N N N   L. TRICEPS N None None None None N N N N   L. PRON TERES N None None None None N N N N   L. FIRST D INTEROSS N None None None None N N N N   L. TRAPEZIUS (U) Increased 3+ 3+ None None N 1+ 1+ Reduced

## 2018-04-11 NOTE — MR AVS SNAPSHOT
After Visit Summary   4/11/2018    Braeden Umana    MRN: [id number]           Patient Information     Date Of Birth          1950        Visit Information        Provider Department      4/11/2018 2:15 PM Sukhjinder Figueroa MD [facility] EMG        Today's Diagnoses     Spinal accessory neuropathy           Follow-ups after your visit        Your next 10 appointments already scheduled     Apr 12, 2018  2:00 PM CDT   SHERWIN Spine with Bari Talbot PT   [facility] ([facility]  )    [address] [place] [address]   [phone]            Apr 19, 2018  2:00 PM CDT   SHERWIN Spine with Bari Talbot PT   [facility] ([facility]  )    [address] [place] [address]   [phone]            Jun 19, 2018  3:00 PM CDT   SHORT with Kevin Nunez RPH   [facility] ([facility])    [address] [place] [address] [phone]            Jun 28, 2018  1:30 PM CDT   Return Visit with Josue Clements MD   [facility] ([facility])    [address] [place] [id number] [phone]              Who to contact     Please call your clinic at [phone] to:    Ask questions about your health    Make or cancel appointments    Discuss your medicines    Learn about your test results    Speak to your doctor            Additional Information About Your Visit        [employer] Information     [employer] gives you secure access to your electronic health record. If you see a primary care provider, you can also send messages to your care team and make appointments. If you have questions, please call your primary care clinic.  If you do not have a primary care provider, please call [phone] and they will assist you.      [employer] is an electronic gateway that  provides easy, online access to your medical records. With [employer], you can request a clinic appointment, read your test results, renew a prescription or communicate with your care team.     To access your existing account, please contact your [facility] or call [phone] for assistance.        Care EveryWhere ID     This is your Care EveryWhere ID. This could be used by other organizations to access your [place] medical records  [id number]         Blood Pressure from Last 3 Encounters:   03/29/18 122/64   03/20/18 116/80   02/26/18 133/74    Weight from Last 3 Encounters:   03/29/18 100.4 kg (221 lb 6.4 oz)   03/20/18 98.3 kg (216 lb 12.8 oz)   02/26/18 98 kg (216 lb)              We Performed the Following     EMG     HC NCS MOTOR W OR W/O F-WAVE, 7 OR 8     HC NEEDLE EMG EA EXTREMTY W/PARASPINAL AREA COMPLETE     NEEDLE EMG OF CRANIAL NERVE UNILATERAL          Today's Medication Changes          These changes are accurate as of 4/11/18  2:38 PM.  If you have any questions, ask your nurse or doctor.               These medicines have changed or have updated prescriptions.        Dose/Directions    atenolol 50 MG tablet   Commonly known as:  TENORMIN   Indication:  2 daily   This may have changed:  how much to take   Used for:  Hypertension goal BP (blood pressure) < 140/90        Dose:  50 mg   Take 1 tablet (50 mg) by mouth daily   Quantity:  135 tablet   Refills:  0                Primary Care Provider Office Phone # Fax #    Treva Saleem [phone]       [address] [facility] [address]        Equal Access to Services     [facility] : Hadii lainey strauss Soyvette, waaxda luqadaha, qaybta kaalmadidi crump. So [facility] [phone].    ATENCIÓN: Si habla español, tiene a guajardo disposición servicios gratuitos de asistencia lingüística. Llame al [phone].    We comply with applicable federal civil rights  laws and [place] laws. We do not discriminate on the basis of race, color, national origin, age, disability, sex, sexual orientation, or gender identity.            Thank you!     Thank you for choosing [facility]  for your care. Our goal is always to provide you with excellent care. Hearing back from our patients is one way we can continue to improve our services. Please take a few minutes to complete the written survey that you may receive in the mail after your visit with us. Thank you!             Your Updated Medication List - Protect others around you: Learn how to safely use, store and throw away your medicines at www.disposemymeds.org.          This list is accurate as of 4/11/18  2:38 PM.  Always use your most recent med list.                   Brand Name Dispense Instructions for use Diagnosis    ALPRAZolam 0.25 MG tablet    XANAX     Take 0.25 mg by mouth 3 times daily as needed.        * amLODIPine 5 MG tablet    NORVASC     Take 5 mg by mouth At Bedtime        * amLODIPine 10 MG tablet    NORVASC    90 tablet    Take 1 tablet (10 mg) by mouth daily    Hypertension goal BP (blood pressure) < 140/90       aspirin 81 MG EC tablet     90 tablet    Take 1 tablet (81 mg) by mouth daily        atenolol 50 MG tablet    TENORMIN    135 tablet    Take 1 tablet (50 mg) by mouth daily    Hypertension goal BP (blood pressure) < 140/90       BENADRYL 25 MG tablet   Generic drug:  diphenhydrAMINE      Take 25 mg by mouth every 6 hours as needed.        CYMBALTA 60 MG EC capsule   Generic drug:  DULoxetine     0    1 CAPSULE ONCE DAILY        ezetimibe 10 MG tablet    ZETIA    90 tablet    Take 1 tablet (10 mg) by mouth daily    Hyperlipidemia LDL goal <70       losartan 50 MG tablet    COZAAR    90 tablet    Take 1 tablet (50 mg) by mouth daily    Hypertension goal BP (blood pressure) < 140/90       NEURONTIN 300 MG capsule   Generic drug:  gabapentin     90 capsule    Take 300 mg by mouth daily Take 3 tablets  daily        nitroGLYcerin 0.4 MG sublingual tablet    NITROSTAT    1 tablet    Place 0.4 mg under the tongue every 5 minutes as needed for chest pain Reported on 5/22/2017        PLAVIX 75 MG tablet   Generic drug:  clopidogrel     3 MONTHS    ONE DAILY        QUEtiapine 25 MG tablet    SEROquel     Take 25 mg by mouth Take 1 to 2 tablets by mouth at bedtime    Diplopia, Memory loss, Fatigue, unspecified type       ranitidine 150 MG tablet    ZANTAC    60 tablet    Take 1 tablet (150 mg) by mouth 2 times daily    Gastroesophageal reflux disease, esophagitis presence not specified       STATIN NOT PRESCRIBED (INTENTIONAL)      Please choose reason not prescribed, below    Atherosclerosis of coronary artery of native heart, angina presence unspecified, unspecified vessel or lesion type       * Notice:  This list has 2 medication(s) that are the same as other medications prescribed for you. Read the directions carefully, and ask your doctor or other care provider to review them with you.

## 2018-04-11 NOTE — LETTER
2018       RE: Braeden Umana  [address]   [place] [address]     Dear Colleague,    Thank you for referring your patient, Braeden Umana, to the [facility] EMG at [facility]. Please see a copy of my visit note below.        [facility]  Electrodiagnostic Laboratory    Nerve Conduction & EMG Report          Patient:       Braeden Umana  Patient ID:    [id number]  Gender:        Male  YOB: 1950  Age:           68 Years 1 Months        History & Examination:  68 year old with left shoulder weakness after carotid endarterectomy 18. Eval for focal neuropathy vs plexopathy vs radiculopathy.     Techniques: Motor and sensory conduction studies were done with surface recording electrodes. EMG was done with a concentric needle electrode.      Results:  Nerve conduction studies:  1. Left median-D2, ulnar-D5, and radial sensory responses were normal.   2. Left median-APB and ulnar-ADM motor responses were normal.   3. Left spinal-accessory-trap motor response amplitude is reduced compared to the right.     Needle EM. Fibrillation potentials and positive sharp waves were seen in the left trapezius muscle.   2. Long duration motor unit potentials (MUP) with increased polyphasia and reduced recruitment were seen in the left trapezius muscle.     Interpretation:  This is an abnormal study. 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 The presence of motor unit remodeling in the trapezius muscle is a finding indicating early reinnervation (i.e., repair) after previous denervation. There is no evidence of a widespread cervical radiculopathy or brachial plexopathy on the basis of this study. Clinical correlation is recommended.    Sukhjinder Figueroa MD  Department of Neurology         Sensory NCS      Nerve / Sites Rec. Site Onset Peak Ref. NP Amp Ref. PP Amp Dist Jonathan Ref. Temp     ms ms ms  V   V  V cm m/s m/s  C   L MEDIAN - Dig II Anti      Wrist Dig II 2.86 4.22  17.9 10.0 26.4 14 48.9 48.0 34   L ULNAR - Dig V Anti      Wrist Dig V 2.29 3.02  17.3 8.0 24.6 12.5 54.5 48.0 33.6   L RADIAL - Snuff      Forearm Snuff 2.08 2.66  17.6 15.0 27.0 12.5 60.0 48.0 33.9       Motor NCS      Nerve / Sites Rec. Site Lat Ref. Amp Ref. Rel Amp Dist Jonathan Ref. Dur. Area Temp.     ms ms mV mV % cm m/s m/s ms %  C   L MEDIAN - APB      Wrist APB 3.96 4.40 11.0 5.0 100 8   5.47 100 33.8      Elbow APB 7.71  9.8  89.2 22 58.7 48.0 6.51 92 33.9   L ULNAR - ADM      Wrist ADM 2.97 3.50 10.1 5.0 100 8   6.56 100 33.6      B.Elbow ADM 6.72  9.0  88.8 21 56.0 48.0 6.51 90 33.4      A.Elbow ADM 8.65  8.9  87.7 10 51.9 48.0 7.03 88 33.3   L SPINAL ACCESS - Trapezius      Neck Trapezius 2.81  0.1  100    11.35  28.4   R SPINAL ACCESS - Trapezius      Neck Trapezius 3.13  1.9  100    10.68 100 28.9       F  Wave      Nerve Min F Lat Max F Lat Mean FLat Temp.    ms ms ms  C   L ULNAR 29.32 31.15 30.49 33.1       EMG Summary Table     Spontaneous MUAP Recruitment    IA Fib PSW Fasc H.F. Amp Dur. PPP Pattern   L. DELTOID N None None None None N N N N   L. BICEPS N None None None None N N N N   L. TRICEPS N None None None None N N N N   L. PRON TERES N None None None None N N N N   L. FIRST D INTEROSS N None None None None N N N N   L. TRAPEZIUS (U) Increased 3+ 3+ None None N 1+ 1+ Reduced                              Again, thank you for allowing me to participate in the care of your patient.      Sincerely,    Sukhjinder Figueroa MD

## 2018-04-12 ENCOUNTER — THERAPY VISIT (OUTPATIENT)
Dept: PHYSICAL THERAPY | Facility: CLINIC | Age: 68
End: 2018-04-12
Payer: MEDICARE

## 2018-04-12 DIAGNOSIS — M25.512 SHOULDER PAIN, LEFT: ICD-10-CM

## 2018-04-12 DIAGNOSIS — M54.2 CERVICAL PAIN: ICD-10-CM

## 2018-04-12 PROCEDURE — 97110 THERAPEUTIC EXERCISES: CPT | Mod: GP | Performed by: PHYSICAL THERAPIST

## 2018-04-12 PROCEDURE — 97112 NEUROMUSCULAR REEDUCATION: CPT | Mod: GP | Performed by: PHYSICAL THERAPIST

## 2018-04-13 ENCOUNTER — DOCUMENTATION ONLY (OUTPATIENT)
Dept: NEUROLOGY | Facility: CLINIC | Age: 68
End: 2018-04-13

## 2018-04-13 NOTE — PROGRESS NOTES
I personally discussed the results of EMG with the patient.  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  I advised the patient to continue physical therapy for the next several months.    Josue Clements MD.

## 2018-04-17 ENCOUNTER — OFFICE VISIT (OUTPATIENT)
Dept: NEUROPSYCHOLOGY | Facility: CLINIC | Age: 68
End: 2018-04-17

## 2018-04-17 DIAGNOSIS — F31.9 BIPOLAR I DISORDER (H): ICD-10-CM

## 2018-04-17 DIAGNOSIS — F09 COGNITIVE DISORDER: Primary | ICD-10-CM

## 2018-04-17 NOTE — MR AVS SNAPSHOT
After Visit Summary   4/17/2018    Braeden Umana    MRN: [id number]           Patient Information     Date Of Birth          1950        Visit Information        Provider Department      4/17/2018 8:30 AM Gretel Tyler PsyD [facility]        Today's Diagnoses     Cognitive disorder    -  1    Bipolar I disorder (H)           Follow-ups after your visit        Your next 10 appointments already scheduled     May 17, 2018  1:20 PM CDT   SHERWIN Spine with Bari Talbot PT   [place] [facility] ([facility]  )    [address] [place] [address]   [phone]            Jun 19, 2018  3:00 PM CDT   SHORT with Kevin Nunez RPH   [facility] ([facility])    [address] [place] [address]   [phone]            Jun 28, 2018  1:30 PM CDT   Return Visit with Josue Clements MD   [facility] ([facility])    [address] [place] [address]   [phone]              Who to contact     Please call your clinic at [phone] to:    Ask questions about your health    Make or cancel appointments    Discuss your medicines    Learn about your test results    Speak to your doctor            Additional Information About Your Visit        MyChart Information     [employer] gives you secure access to your electronic health record. If you see a primary care provider, you can also send messages to your care team and make appointments. If you have questions, please call your primary care clinic.  If you do not have a primary care provider, please call [phone] and they will assist you.      [employer] is an electronic gateway that provides easy, online access to your medical records. With [employer], you can request a clinic appointment, read your test results, renew a prescription or communicate with your care  team.     To access your existing account, please contact your [facility] or call [phone] for assistance.        Care EveryWhere ID     This is your Care EveryWhere ID. This could be used by other organizations to access your [place] medical records  [id number]         Blood Pressure from Last 3 Encounters:   03/29/18 122/64   03/20/18 116/80   02/26/18 133/74    Weight from Last 3 Encounters:   03/29/18 100.4 kg (221 lb 6.4 oz)   03/20/18 98.3 kg (216 lb 12.8 oz)   02/26/18 98 kg (216 lb)              We Performed the Following     [id number] TESTING, PER HR/PSYCHOLOGIST     NEUROPSYCH TESTING BY [facility]          Today's Medication Changes          These changes are accurate as of 4/17/18 11:59 PM.  If you have any questions, ask your nurse or doctor.               These medicines have changed or have updated prescriptions.        Dose/Directions    atenolol 50 MG tablet   Commonly known as:  TENORMIN   Indication:  2 daily   This may have changed:  how much to take   Used for:  Hypertension goal BP (blood pressure) < 140/90        Dose:  50 mg   Take 1 tablet (50 mg) by mouth daily   Quantity:  135 tablet   Refills:  0                Primary Care Provider Office Phone # Fax #    Treva Saleem [phone] [address] [facility] [address]        Equal Access to Services     STEVIE BUSTAMANTE AH: Hadii lainey butler hadasho Soyvette, waaxda luqadaha, qaybta kaalmada yudelka, didi antoine. So [facility] [phone].    ATENCIÓN: Si habla español, tiene a guajardo disposición servicios gratuitos de asistencia lingüística. Hebert al [phone].    We comply with applicable federal civil rights laws and [place] laws. We do not discriminate on the basis of race, color, national origin, age, disability, sex, sexual orientation, or gender identity.            Thank you!     Thank you for choosing [facility]UROPSYCHOLOGY  for your care. Our  goal is always to provide you with excellent care. Hearing back from our patients is one way we can continue to improve our services. Please take a few minutes to complete the written survey that you may receive in the mail after your visit with us. Thank you!             Your Updated Medication List - Protect others around you: Learn how to safely use, store and throw away your medicines at www.disposemymeds.org.          This list is accurate as of 4/17/18 11:59 PM.  Always use your most recent med list.                   Brand Name Dispense Instructions for use Diagnosis    ALPRAZolam 0.25 MG tablet    XANAX     Take 0.25 mg by mouth 3 times daily as needed.        * amLODIPine 5 MG tablet    NORVASC     Take 5 mg by mouth At Bedtime        * amLODIPine 10 MG tablet    NORVASC    90 tablet    Take 1 tablet (10 mg) by mouth daily    Hypertension goal BP (blood pressure) < 140/90       aspirin 81 MG EC tablet     90 tablet    Take 1 tablet (81 mg) by mouth daily        atenolol 50 MG tablet    TENORMIN    135 tablet    Take 1 tablet (50 mg) by mouth daily    Hypertension goal BP (blood pressure) < 140/90       BENADRYL 25 MG tablet   Generic drug:  diphenhydrAMINE      Take 25 mg by mouth every 6 hours as needed.        CYMBALTA 60 MG EC capsule   Generic drug:  DULoxetine     0    1 CAPSULE ONCE DAILY        ezetimibe 10 MG tablet    ZETIA    90 tablet    Take 1 tablet (10 mg) by mouth daily    Hyperlipidemia LDL goal <70       losartan 50 MG tablet    COZAAR    90 tablet    Take 1 tablet (50 mg) by mouth daily    Hypertension goal BP (blood pressure) < 140/90       NEURONTIN 300 MG capsule   Generic drug:  gabapentin     90 capsule    Take 300 mg by mouth daily Take 3 tablets daily        nitroGLYcerin 0.4 MG sublingual tablet    NITROSTAT    1 tablet    Place 0.4 mg under the tongue every 5 minutes as needed for chest pain Reported on 5/22/2017        PLAVIX 75 MG tablet   Generic drug:  clopidogrel     3 MONTHS     ONE DAILY        QUEtiapine 25 MG tablet    SEROquel     Take 25 mg by mouth Take 1 to 2 tablets by mouth at bedtime    Diplopia, Memory loss, Fatigue, unspecified type       ranitidine 150 MG tablet    ZANTAC    60 tablet    Take 1 tablet (150 mg) by mouth 2 times daily    Gastroesophageal reflux disease, esophagitis presence not specified       STATIN NOT PRESCRIBED (INTENTIONAL)      Please choose reason not prescribed, below    Atherosclerosis of coronary artery of native heart, angina presence unspecified, unspecified vessel or lesion type       * Notice:  This list has 2 medication(s) that are the same as other medications prescribed for you. Read the directions carefully, and ask your doctor or other care provider to review them with you.

## 2018-04-17 NOTE — PROGRESS NOTES
The patient was seen for neuropsychological evaluation at the request of Josue Clements for the purposes of diagnostic clarification and treatment planning.  Two hours of face-to-face testing were provided by this writer.  Please see Dr. Gretel Tyler's report for a full interpretation of the findings.

## 2018-04-19 ENCOUNTER — THERAPY VISIT (OUTPATIENT)
Dept: PHYSICAL THERAPY | Facility: CLINIC | Age: 68
End: 2018-04-19
Payer: MEDICARE

## 2018-04-19 DIAGNOSIS — M54.2 CERVICAL PAIN: ICD-10-CM

## 2018-04-19 DIAGNOSIS — M25.512 SHOULDER PAIN, LEFT: ICD-10-CM

## 2018-04-19 PROCEDURE — 97110 THERAPEUTIC EXERCISES: CPT | Mod: GP | Performed by: PHYSICAL THERAPIST

## 2018-04-19 PROCEDURE — 97140 MANUAL THERAPY 1/> REGIONS: CPT | Mod: GP | Performed by: PHYSICAL THERAPIST

## 2018-04-19 PROCEDURE — 97112 NEUROMUSCULAR REEDUCATION: CPT | Mod: GP | Performed by: PHYSICAL THERAPIST

## 2018-04-19 PROCEDURE — G8984 CARRY CURRENT STATUS: HCPCS | Mod: GP | Performed by: PHYSICAL THERAPIST

## 2018-04-19 PROCEDURE — G8985 CARRY GOAL STATUS: HCPCS | Mod: GP | Performed by: PHYSICAL THERAPIST

## 2018-04-22 NOTE — PROGRESS NOTES
Service Date: 04/17/2018      NEUROPSYCHOLOGICAL EVALUATION      RELEVANT HISTORY AND REASON FOR REFERRAL:  Braeden Umana is a 68-year-old  white man with a history of bipolar disorder, anxiety, sleep apnea, hypertension, coronary atherosclerosis and carotid artery stenosis, coming to neurological attention last year with memory concerns and diplopia.  A brain MRI collected on 12/14/2017 showed nonspecific scattered T2 hyperintensities in the subcortical white matter, probably reflecting sequelae of chronic small vessel ischemic disease and a single focus of microhemorrhage in the right frontal subcortical white matter, likely sequelae of chronic small vessel ischemic disease.  There were no findings to explain the disconjugate gaze.  In February of this year he underwent left endarterectomy, and subsequently developed trapezius muscle weakness, left shoulder pain, weakness and limited abduction of the left arm and difficulty with shoulder shrug.  The diplopia has been largely successfully treated with prism lenses.  Apparently he had a fairly normal occupational therapy evaluation for the cognitive problems.  This neuropsychological evaluation is now requested by his neurologist, Dr. Josue Clements to help with diagnostic clarification and treatment planning.      Current medications per Epic are Xanax, Norvasc, atenolol, low dose aspirin, Cymbalta, Benadryl, Zetia, gabapentin, nitroglycerin, Plavix, Seroquel and Zantac.      The second of four children, he was born in [facility] but mostly grew up in [place], [place] raised by his parents.  His father struggled with psychiatric afflictions, needing frequent hospitalizations and held odd jobs and gail Social Security Disability benefits.  His mother was a .  Mr. Umana did well in school, earning a Bachelor's degree in English and Khmer from [employer] and a Master's degree in English and literature from the [employer].  He completed all of the course work for a PhD there, but did not finish the dissertation.  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  He retired from that line of work 15 years ago and began drawing Social Security Disability benefits himself.  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  He lives in [place] with his spouse of 40 years.  She works part-time for [employer] in an outreach program.  They have a local son and a daughter in [place], [place].      BEHAVIORAL OBSERVATIONS AND CLINICAL INTERVIEW FINDINGS:  He arrived 15 minutes early, unaccompanied, presenting as an affable older gentleman of average stature and obese build, with white hair and a full beard that encroached on his neck, neat in a green short-sleeved shirt worn over a dark T-shirt and dark pants.  Mood seemed upbeat.  Affect and social behavior were appropriate.  Thoughts were conveyed in a clear logical fashion, and he demonstrated good insight into his situation.      He perceived a dramatic change in physical and mental functioning around September, 2017 when he developed double vision, word searching and faulty memory, causing him to miss appointments and make other mental errors.  He was also struggling with depression and sarath, and figured some of the changes were due to his age, as he was approaching 68.  The brain scan showed minor small vessel ischemia and pre-existing carotid artery blockage, which had been monitored for years.  The double vision was treated with prism lenses, later changed to stronger prisms, which substantially improved the diplopia.  He is not sure where he had the left endarterectomy, around February of this year.  It is his understanding that it was complicated by nerve damage which is affecting his left arm.  He cannot lift that arm above his head.  He went through physical therapy without much improvement.  " Dr. Clements did an EMG and told him it should gradually improve over time.  He also gets neck, shoulder blade and left arm pain.  He's been told the carotid artery occlusion probably did not cause the initial symptoms of concern.      Cognitively, he has more difficulty with communications and words, unusual for him, as he's always been skilled at that, and enjoys writing.  He feels his short-term memory was never good, but has worsened.  For example, he will forget grocery store items without a list, and doesn't reliably remember names.  He seems to remember recent actions and activities as well as places.  He notices some decline in higher level cognitive functions, such as writing music.  These cognitive inefficiencies correlate with mood states (\"without a doubt\") as he feels less sharp when depressed, and sharper than usual when in a manic state.      There is a long history of a mood disorder, which runs strongly in his family.  In his case, the primary condition is bipolar disorder.  Mood symptoms began as a youngster, which he in part attributes to a chaotic home life owing to his father's severe mental illness.  He recalls his parents sent him to see a psychoanalyst when he was in elementary or [employer] school.  The provider was not used to working with children and the intervention was not particularly helpful.  Later he sought mental health treatment around age 16, while in college.  Over the years he has been treated with assorted psychotropics and various forms of therapy, including primal therapy.  He's had suicidal thoughts but never acted on it, noting he's an optimist at his core.  He has not had ECTs or psychiatric hospitalizations.  The depression is characterized by  \"total exhaustion,\" excess sleep, overeating and diminished libido.  Yari is described as feeling like he's driving a car with one foot revving the gas, the other hitting the brakes.  He has a sense of desperation.  Sometimes " he can channel the excess energy into productive efforts, and it probably contributes to his creative thinking.  For example, he wrote several novels and composes music.  Now he's involved in woodworking.  During the manic episodes he sleeps less and talks fast.  These episodes can last for days at a time, but can also fluctuate fairly rapidly from depression to sarath.  Mood stabilizers have definitely helped.  Currently he sees his psychiatrist every 3-6 months, and he just established care with a new therapist and plans to see that provider every other week.      The rest of the medical history is unremarkable for the purposes of this evaluation.  He's never had stroke symptoms, seizures or head traumas with concussive sequelae.  Four or five years ago, while undergoing a diagnostic study, he had a bad reaction to contrast dye and apparently went into anaphylactic shock.  He thinks he might have had a cardiac arrest requiring CPR and possibly cardioversion, but he is not clear about the details. Coronary artery disease was first diagnosed around .  He had a heart attack in .  Over the years he's had 19 stents placed.  He also has hypertension.  Besides the left endarterectomy, he had nasal surgery to address a chronic sinus infection and an appendectomy as a child.      He has not been a tobacco user since his college years.  Drug use of any kind was denied.  He does not use alcohol and has never been a regular or heavy drinker.      Family history is mentionable for virtually his entire immediate family who have mood disorders.  His father was bipolar, his mother was probably depressed, as were his siblings.  One brother has OCD symptoms as well, and another brother seems to have autism spectrum disorder.  Both parents and his brother had heart disease and his father had Parkinson's disease.  His mother  around age 67 from complications of an MI.  His father  in his 80s with possible kidney  failure along with heart disease and Parkinson's disease.      During testing he was upbeat and cheerful, fully cooperative and well-motivated.  He was alert and attentive throughout and had no difficulty understanding or following directives.  Results are considered technically valid.      NEUROPSYCHOLOGICAL FINDINGS:  Select intellectual abilities were assessed with subtests from the Wechsler Adult Intelligence Scale Scale-IV.  Visuospatial processing and constructional abilities (Block Design) were low average.  Speeded graphomotor learning (Coding) and auditory attention span on a digit sequence learning exercise (Digit Span) were average.  He could repeat five digits in the forward direction and five in the reverse order.  Word knowledge and expressive communicability (Vocabulary) and social understanding, practical problem solving and verbal reasoning (Comprehension) were very superior.      Memory was grossly intact.  He was fully oriented, providing the correct date, day of the week and time of day.  Immediate memory for two story passages from the Wechsler Memory Scale-Revised was low average with 18 of 50 story elements recalled immediately after presentation.  Recall of the stories 30 minutes later was average.  Word list learning (Vinicio Auditory Verbal Learning Test) was average for overall learning despite somewhat inconsistent performance across trials, and a few intrusive errors.  Eight of the 15 words were learned by the last trial.  Retention of the list after a brief distractor exercise and 30-minute delay was average, with 100% retention of the originally learned material.  Twelve of the 15 words were correctly recognized when presented among a list of foils with two intrusive errors.  Immediate memory for figural material (four designs from the WMS) was average, with above average retention 30 minutes later.  Three of the four figures were correctly recognized when presented in multiple-choice  format.  His copy drawings of three Scales-Gestalt figures were reduced in size with slight integration errors and simplifications, still within normal limits for his age.  All three figures were recalled immediately after presentation, with some additional distortion.      Nonverbal associative fluency on the Make A Figure Test (producing novel designs under time constraints) was superior.  Nonverbal planning and foresight, as demonstrated on a maze-solving exercise (Porteus Maze Test), were above average.  Inductive reasoning on a one-deck version of the Wisconsin Card Sorting Test was mildly impaired with just one category abstracted (he employing an overly complex solution strategy).      Expressive and receptive language abilities were grossly intact.  Spontaneous speech was fluent, prosodic and grammatically rich.  Comprehension, as measured by the Token Test, was low average with a couple of minor errors.  Verbal associative fluency on the Controlled Oral Word Association Test (generating words beginning with target letters) was above average with 51 countable responses produced across the three 60 second trials.  Semantic fluency (rapid naming of animals, fruits and vegetables) was also above average.  Confrontation naming on the Winn Naming Test was fully intact, with all 60 pictured items correctly, rapidly named.      Fine motor speed and dexterity (Grooved Pegboard) was low average bilaterally, with comparable speeds for both hands.  He is right-handed.  A biletter cancellation exercise requiring efficient visual scanning and sustained vigilance was completed a little slowly with three omission errors.      CONCLUSIONS AND RECOMMENDATIONS:  This very pleasant 68-year-old man first came to neurological attention last year with memory concerns.  He also developed diplopia, treated with prism lenses, and was found to have an occluded left carotid artery,  treated with endarterectomy in February.   Unfortunately that was complicated by nerve damage causing left arm pain and reduced range of motion.  He has had physical therapy for that and has been told that it will probably gradually improve over time.  Mr. Umana has bipolar disorder, and notices a definite relationship between mood states and cognition (cognitive struggles during depressed).      The test findings are essentially within normal limits.  He is a little slow bilaterally on a fine motor dexterity task and peripheral factors may be partly to blame.  Nonverbal reasoning abilities are low average, while processing speeds and working memory are average and vocabulary and verbal reasoning are very superior.  These are likely normal variants.  Executive abilities are above average for the most part, with the exception of inductive reasoning (he apparently used an overly elaborate  solution strategy on the Wisconsin Card Sorting Test).  Expressive and receptive language abilities are intact.  Confrontation naming is perfect and speeded word retrieval is above average.  He is fully oriented with average long-term retention of story passages and a word list and high average  retention of figural material.  There are no signs of hemispatial visual neglect or visual misperceptions.      Taken as a whole, the findings do not provide consistent or compelling evidence of brain dysfunction.  I think the cognitive inefficiencies are due to normal aging and mood states.  He is on track with mental health treatment and insightful about the need for that and I have no further recommendations.      Gretel Tyler PsyD   Licensed Psychologist   Certified in Clinical Neuropsychology/[facility]       DIAGNOSTIC IMPRESSION:  Cognitive disorder associated with bipolar disorder.  This evaluation included approximately 3 hours of testing administered by a psychometrist with interpretation by a neuropsychologist (CPT code 58794) and an additional 3 hours of professional  time spent on the interview, data integration, record review and report preparation (CPT code 76983).         BIJU NINO             D: 2018   T: 2018   MT: nh      Name:     AI SHER   MRN:      [id number]        Account:      [id number]   :      1950           Service Date: 2018      Document: [id number]

## 2018-04-26 NOTE — PROGRESS NOTES
CHEUNG ORIENTATION TEST WAIS-IV                                    Raw           Age Scaled    Score  100  Vocabulary  52    15      Block Design  24      8       WECHSLER MEMORY SCALES Coding  53    11        Immed.   30 Min Digit Span  24    10      Logical Memory  9/9   7/7  Comprehension  33    16     Visual Reproduction    9   10      30 Minute Recognition    3   PORTEUS MAZE TEST       KELLI AUDITORY VERBAL LEARNING TEST  Test Age           16.5       Test Quotient         127        I  II  III IV  V VI VII     5   6   8   9   8   4   8   PSYCHOMOTOR TESTS        30 Minute Recall    8       Right    Left    30 Minute Recognition  12  Grooved Pegboard    94    94     Intrusions    2   Drops: 0         Drops: 0       LETTER CANCELLATION TEST MAKE A FIGURE TEST        Time  146   Errors     3      Score        50          SULTANA-GESTALT (3-figure) CONTROLLED WORD ASSOCIATION TEST       Recall  2.5  Score  51        BOSTON NAMING TEST TOKEN TEST       Score  60  Score  159        WISCONSIN CARD SORTING TEST - 1 deck SEMANTIC FLUENCY           # of categories   1  Raw Score  57

## 2018-06-05 ENCOUNTER — OFFICE VISIT (OUTPATIENT)
Dept: FAMILY MEDICINE | Facility: CLINIC | Age: 68
End: 2018-06-05
Payer: COMMERCIAL

## 2018-06-05 VITALS
WEIGHT: 216.13 LBS | DIASTOLIC BLOOD PRESSURE: 74 MMHG | SYSTOLIC BLOOD PRESSURE: 130 MMHG | OXYGEN SATURATION: 98 % | TEMPERATURE: 97.3 F | RESPIRATION RATE: 18 BRPM | BODY MASS INDEX: 35.69 KG/M2 | HEART RATE: 86 BPM

## 2018-06-05 DIAGNOSIS — I25.10 ATHEROSCLEROSIS OF CORONARY ARTERY OF NATIVE HEART, ANGINA PRESENCE UNSPECIFIED, UNSPECIFIED VESSEL OR LESION TYPE: ICD-10-CM

## 2018-06-05 DIAGNOSIS — M76.829 POSTERIOR TIBIAL TENDON DYSFUNCTION: Primary | ICD-10-CM

## 2018-06-05 DIAGNOSIS — H61.23 BILATERAL IMPACTED CERUMEN: ICD-10-CM

## 2018-06-05 PROCEDURE — 69209 REMOVE IMPACTED EAR WAX UNI: CPT | Performed by: NURSE PRACTITIONER

## 2018-06-05 PROCEDURE — 99214 OFFICE O/P EST MOD 30 MIN: CPT | Mod: 25 | Performed by: NURSE PRACTITIONER

## 2018-06-05 RX ORDER — CLOPIDOGREL BISULFATE 75 MG/1
TABLET ORAL
Qty: 90 TABLET | Status: SHIPPED | OUTPATIENT
Start: 2018-06-05 | End: 2019-06-25

## 2018-06-05 NOTE — PROGRESS NOTES
"  SUBJECTIVE:   Braeden Umana is a 68 year old male who presents to clinic today for the following health issues:    Pt notes right ear seems to be plugged   No pain      Musculoskeletal problem/pain      Duration: intermittent flare ups, recently getting worst     Description  Location: left ankle, \"looks wrong, off to the side a little bit\" and \"every once in a while a sudden twinge\".     Intensity:  Intermittent moderate     Accompanying signs and symptoms: intermittent swelling and bruising     History  Previous similar problem: YES- off and on again for years   Previous evaluation: brace     Precipitating or alleviating factors:  Trauma or overuse: not sure  Aggravating factors include: long walks    Therapies tried and outcome: nothing and started using brace again when taking a long walk that helps     He has an ankle brace at home that he has needed to use in the past, recently feels like he might need to use it again.    He did see podiatry in 2008 and 2012 for same symptoms.  Xray was negative.  Diagnosis was posterior tibial tendon dysfunction.  He has been wearing his orthotics - current pair is about 2 years old.     He would like to get his ears washed out.          Problem list and histories reviewed & adjusted, as indicated.  Additional history: as documented        Reviewed and updated as needed this visit by clinical staff  Allergies  Meds       Reviewed and updated as needed this visit by Provider         ROS:  CONSTITUTIONAL: NEGATIVE for fever, chills, change in weight  ENT/MOUTH: see HPI  RESP: NEGATIVE for significant cough or SOB  CV: NEGATIVE for chest pain, palpitations or peripheral edema  GI: NEGATIVE for nausea, abdominal pain, heartburn, or change in bowel habits  MUSCULOSKELETAL: see HPI  NEURO: NEGATIVE for weakness, dizziness or paresthesias  PSYCHIATRIC: NEGATIVE for changes in mood or affect    OBJECTIVE:     /74  Pulse 86  Temp 97.3  F (36.3  C) (Tympanic)  Resp 18 "  Wt 216 lb 2 oz (98 kg)  SpO2 98%  BMI 35.69 kg/m2  Body mass index is 35.69 kg/(m^2).  GENERAL: healthy, alert and no distress  HENT: normal cephalic/atraumatic and both ears: occluded with wax  RESP: lungs clear to auscultation - no rales, rhonchi or wheezes  CV: regular rate and rhythm, normal S1 S2, no S3 or S4, no murmur, click or rub, no peripheral edema and peripheral pulses strong  MS: tenderness of the left posterior tibial tendon, mild edema medial malleolus, pain with flexion and dorsiflexion of the left foot  SKIN: no suspicious lesions or rashes  NEURO: Normal strength and tone, mentation intact and speech normal  PSYCH: mentation appears normal, affect normal/bright        ASSESSMENT/PLAN:             1. Posterior tibial tendon dysfunction  Wear trilock brace during the day, rest, ice.  Voltaren gel PRN.  See podiatry if symptoms are not improving over the next month.   - diclofenac (VOLTAREN) 1 % GEL topical gel; Apply 2 grams to left ankle four times daily as needed using enclosed dosing card.  Dispense: 100 g; Refill: 0    2. Atherosclerosis of coronary artery of native heart, angina presence unspecified, unspecified vessel or lesion type  Refills given.   - clopidogrel (PLAVIX) 75 MG tablet; ONE DAILY  Dispense: 90 tablet; Refill: PRN    3. Bilateral impacted cerumen  Bilateral ear irrigation done by MA without instruments with success.   - HC REMOVAL IMPACTED CERUMEN IRRIGATION/LVG RIGOBERTO Saleem NP  [facility]

## 2018-06-05 NOTE — NURSING NOTE
Braeden Umana is a 68 year old male who presents in clinic with complaint of impacted ear wax (cerumen).  Per the order of Bottem, ear wax was removed from both sides by flushing with warm water and diocto solution and manual debridement has been performed. Patient denies pain/dizziness/discharge/drainage  (if yes, stop procedure and huddle with provider).  Ear wax has been successfully removed. (If not, huddle with provider).   Sveta Tomlinson

## 2018-06-06 ASSESSMENT — PATIENT HEALTH QUESTIONNAIRE - PHQ9: SUM OF ALL RESPONSES TO PHQ QUESTIONS 1-9: 6

## 2018-06-28 ENCOUNTER — OFFICE VISIT (OUTPATIENT)
Dept: NEUROLOGY | Facility: CLINIC | Age: 68
End: 2018-06-28
Payer: COMMERCIAL

## 2018-06-28 VITALS
BODY MASS INDEX: 35.74 KG/M2 | HEART RATE: 90 BPM | WEIGHT: 216.4 LBS | DIASTOLIC BLOOD PRESSURE: 66 MMHG | SYSTOLIC BLOOD PRESSURE: 128 MMHG | OXYGEN SATURATION: 96 % | TEMPERATURE: 98.1 F | RESPIRATION RATE: 16 BRPM

## 2018-06-28 DIAGNOSIS — R41.9 COGNITIVE COMPLAINTS WITH NORMAL NEUROPSYCHOLOGICAL EXAM: Primary | ICD-10-CM

## 2018-06-28 DIAGNOSIS — G52.8 SPINAL ACCESSORY NEUROPATHY: ICD-10-CM

## 2018-06-28 PROCEDURE — 99214 OFFICE O/P EST MOD 30 MIN: CPT | Performed by: PSYCHIATRY & NEUROLOGY

## 2018-06-28 NOTE — PATIENT INSTRUCTIONS
AFTER VISIT SUMMARY (AVS):    At today's visit we thoroughly discussed the results of neuropsychological and occupational therapy cognitive evaluations, which were normal.  At the present moment, there is no evidence of developing dementia or other neurodegenerative condition.  Please return for additional evaluation if your memory worsens.    Next follow-up appointment is on as needed basis.    Please do not hesitate to call me with any questions or concerns.    Thanks.

## 2018-06-28 NOTE — PROGRESS NOTES
ESTABLISHED PATIENT NEUROLOGY NOTE    DATE OF VISIT: 6/28/2018  CLINIC LOCATION: [facility]  MRN: [id number]  PATIENT NAME: Braeden Umana  YOB: 1950    PCP: Treva Saleem NP    REASON FOR VISIT:   Chief Complaint   Patient presents with     Follow Up For     memory-doing fine since last visit     SUBJECTIVE:                                                      HISTORY OF PRESENT ILLNESS: Patient is here for follow up regarding memory concerns.  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  I ordered EMG for further evaluation.  At that time, he did not fully complete the recommended workup for memory problems.  Please refer to my initial/other prior notes for further information.    Since the last visit, the patient did not notice significant memory changes.  He completed neuropsychologic evaluation on 04/17/2018.  It was essentially within normal limits with the impression that some of his cognitive inefficiencies are due to mood disorder and normal aging.  No consistent or compelling evidence of brain dysfunction was noted.  His previous CPT testing was also within normal limits.    The patient reports that the range of motion and strength in his left shoulder increased, but he continues to have intermittent pain managed with gabapentin and Cymbalta.  He completed the recommended EMG study on 04/11/2018, which was consistent with suspected left spinal accessory neuropathy without additional pathologic findings.    He denies interval development of new focal neurological symptoms.    On review of systems, patient endorses no additional active complaints. Medications, allergies, family and social history were also reviewed. There are no changes reported by patient.  REVIEW OF SYSTEMS:                                                    10-system review was completed. Pertinent positives are  included in HPI. The remainder of ROS is negative.  EXAM:                                                    Physical Exam:   Vitals: /66 (BP Location: Left arm, Patient Position: Sitting, Cuff Size: Adult Large)  Pulse 90  Temp 98.1  F (36.7  C) (Oral)  Resp 16  Wt 98.2 kg (216 lb 6.4 oz)  SpO2 96%  BMI 35.74 kg/m2    General: pt is in NAD, cooperative.  Skin: normal turgor, moist mucous membranes, no lesions/rashes noticed.  HEENT: ATNC, white sclera, normal conjunctiva.  Respiratory: Symmetric lung excursion, no accessory respiratory muscle use.  Abdomen: Non distended.  Neurological: awake, cooperative, follows commands, no aphasia or dysarthria noted, cranial nerves II-XII: no ptosis, extraocular motility is full, face is symmetric, shoulder shrug and arm abduction are 4+/5, tongue is midline, equally moves all extremities, no dysmetria bilaterally, casual gait is normal.  DATA:   I reviewed the tabulated EMG data, neuropsychologic and occupational therapy cognitive reports, as detailed in the history of present illness.  ASSESSMENT and PLAN:                                                    Assessment: 68-year-old male patient with history of bipolar disorder, bilateral carotid artery stenosis and left spinal accessory neuropathy, presents for follow-up of memory concerns.    The patient completed the recommended neuropsychologic evaluation, which was essentially within normal limits.  Mild cognitive inefficiencies were attributed to patient's age and mood disorder.  No evidence of emerging neurocognitive disorder was seen.  His CPT was also normal.  These findings were discussed with the patient in great detail.    His left spinal accessory neuropathy is improving.  Pain is currently well managed.    Diagnoses:    ICD-10-CM    1. Cognitive complaints with normal neuropsychological exam R41.9    2. Spinal accessory neuropathy G52.8      Plan: At today's visit we thoroughly discussed the results of  neuropsychological and occupational therapy cognitive evaluations, which were normal.  At the present moment, there is no evidence of developing dementia or other neurodegenerative condition.    I recommended the patient to return for additional evaluation if his memory worsens.  We will repeat his cognitive screening test (MoCA) and if it demonstrates interval worsening, will repeat neuropsychologic evaluation.    Next follow-up appointment is on as needed basis.    I advised the patient to call me with any questions or concerns.    Total Time: 25 minutes with > 50% spent counseling the patient on stated above assessment and recommendations, including review of current symptoms, test results, and the plan.    Josue Clements MD  [facility]/ Neurology

## 2018-06-28 NOTE — MR AVS SNAPSHOT
After Visit Summary   6/28/2018    Braeden Umana    MRN: [id number]           Patient Information     Date Of Birth          1950        Visit Information        Provider Department      6/28/2018 1:30 PM Josue Clements MD [facility]        Care Instructions    AFTER VISIT SUMMARY (AVS):    At today's visit we thoroughly discussed the results of neuropsychological and occupational therapy cognitive evaluations, which were normal.  At the present moment there is no evidence of developing dementia or other neuro degenerative condition.  Please return for additional evaluation if your memory worsens.    Next follow-up appointment is on as needed basis.    Please do not hesitate to call me with any questions or concerns.    Thanks.           Follow-ups after your visit        Who to contact     If you have questions or need follow up information about today's clinic visit or your schedule please contact [facility] directly at [phone].  Normal or non-critical lab and imaging results will be communicated to you by MyChart, letter or phone within 4 business days after the clinic has received the results. If you do not hear from us within 7 days, please contact the clinic through [employer] or phone. If you have a critical or abnormal lab result, we will notify you by phone as soon as possible.  Submit refill requests through [employer] or call your pharmacy and they will forward the refill request to us. Please allow 3 business days for your refill to be completed.          Additional Information About Your Visit        MyChart Information     [employer] gives you secure access to your electronic health record. If you see a primary care provider, you can also send messages to your care team and make appointments. If you have questions, please call your primary care clinic.  If you do not have a primary care provider, please call [phone] and  they will assist you.        Care EveryWhere ID     This is your Care EveryWhere ID. This could be used by other organizations to access your [place] medical records  [id number]        Your Vitals Were     Pulse Temperature Respirations Pulse Oximetry BMI (Body Mass Index)       90 98.1  F (36.7  C) (Oral) 16 96% 35.74 kg/m2        Blood Pressure from Last 3 Encounters:   06/28/18 128/66   06/05/18 130/74   03/29/18 122/64    Weight from Last 3 Encounters:   06/28/18 98.2 kg (216 lb 6.4 oz)   06/05/18 98 kg (216 lb 2 oz)   03/29/18 100.4 kg (221 lb 6.4 oz)              Today, you had the following     No orders found for display         Today's Medication Changes          These changes are accurate as of 6/28/18  1:59 PM.  If you have any questions, ask your nurse or doctor.               These medicines have changed or have updated prescriptions.        Dose/Directions    atenolol 50 MG tablet   Commonly known as:  TENORMIN   Indication:  2 daily   This may have changed:  how much to take   Used for:  Hypertension goal BP (blood pressure) < 140/90        Dose:  50 mg   Take 1 tablet (50 mg) by mouth daily   Quantity:  135 tablet   Refills:  0                Primary Care Provider Office Phone # Fax #    Treva Saleem [phone] [address] [facility] [address]        Equal Access to Services     [facility] Hospital[facility] AH: Hadii lainey butler hadasho Soyvette, waaxda luqadaha, qaybta kaalmada yudelka, didi gan . So [facility] [phone].    ATENCIÓN: Si habla español, tiene a guajardo disposición servicios gratuitos de asistencia lingüística. Llame al [phone].    We comply with applicable federal civil rights laws and [place] laws. We do not discriminate on the basis of race, color, national origin, age, disability, sex, sexual orientation, or gender identity.            Thank you!     Thank you for choosing [facility]  for your care. Our goal is  always to provide you with excellent care. Hearing back from our patients is one way we can continue to improve our services. Please take a few minutes to complete the written survey that you may receive in the mail after your visit with us. Thank you!             Your Updated Medication List - Protect others around you: Learn how to safely use, store and throw away your medicines at www.disposemymeds.org.          This list is accurate as of 6/28/18  1:59 PM.  Always use your most recent med list.                   Brand Name Dispense Instructions for use Diagnosis    ALPRAZolam 0.25 MG tablet    XANAX     Take 0.25 mg by mouth 3 times daily as needed.        amLODIPine 10 MG tablet    NORVASC    90 tablet    Take 1 tablet (10 mg) by mouth daily    Hypertension goal BP (blood pressure) < 140/90       aspirin 81 MG EC tablet     90 tablet    Take 1 tablet (81 mg) by mouth daily        atenolol 50 MG tablet    TENORMIN    135 tablet    Take 1 tablet (50 mg) by mouth daily    Hypertension goal BP (blood pressure) < 140/90       BENADRYL 25 MG tablet   Generic drug:  diphenhydrAMINE      Take 25 mg by mouth every 6 hours as needed.        clopidogrel 75 MG tablet    PLAVIX    90 tablet    ONE DAILY    Atherosclerosis of coronary artery of native heart, angina presence unspecified, unspecified vessel or lesion type       CYMBALTA 60 MG EC capsule   Generic drug:  DULoxetine     0    1 CAPSULE ONCE DAILY        diclofenac 1 % Gel topical gel    VOLTAREN    100 g    Apply 2 grams to left ankle four times daily as needed using enclosed dosing card.    Posterior tibial tendon dysfunction       ezetimibe 10 MG tablet    ZETIA    90 tablet    Take 1 tablet (10 mg) by mouth daily    Hyperlipidemia LDL goal <70       losartan 50 MG tablet    COZAAR    90 tablet    Take 1 tablet (50 mg) by mouth daily    Hypertension goal BP (blood pressure) < 140/90       NEURONTIN 300 MG capsule   Generic drug:  gabapentin     90 capsule     Take 300 mg by mouth daily Take 3 tablets daily        nitroGLYcerin 0.4 MG sublingual tablet    NITROSTAT    1 tablet    Place 0.4 mg under the tongue every 5 minutes as needed for chest pain Reported on 5/22/2017        QUEtiapine 25 MG tablet    SEROquel     Take 25 mg by mouth Take 1 to 2 tablets by mouth at bedtime    Diplopia, Memory loss, Fatigue, unspecified type       ranitidine 150 MG tablet    ZANTAC    60 tablet    Take 1 tablet (150 mg) by mouth 2 times daily    Gastroesophageal reflux disease, esophagitis presence not specified       STATIN NOT PRESCRIBED (INTENTIONAL)      Please choose reason not prescribed, below    Atherosclerosis of coronary artery of native heart, angina presence unspecified, unspecified vessel or lesion type

## 2018-07-10 ENCOUNTER — TELEPHONE (OUTPATIENT)
Dept: GASTROENTEROLOGY | Facility: OUTPATIENT CENTER | Age: 68
End: 2018-07-10

## 2018-07-10 NOTE — TELEPHONE ENCOUNTER
"Patient taking any blood thinners ? Patient states he has consulted his provider and will stop Plavix 5 days prior to exam    Heart disease ? Stenting \" a couple of years ago\"    Lung disease ? denies      Sleep apnea ? Cpap    Diabetic ? denies    Kidney disease ? denies    Dialysis ? n/a    Electronic implanted medical devices ? denies    Are you taking any narcotic pain medication ?   noWhat is your daily dosage ?    PTSD ? n/a    Prep instructions reviewed with patient ? Patient declined review.  policy, MAC sedation plan reviewed. Advised patient to have someone stay with him post exam    Pharmacy : n/a    Indication for procedure :   Positive FIT (fecal immunochemical test) [R19.5]  - Primary          Special screening for malignant neoplasms, colon [Z12.11]                 Referring provider :Treva Saleem NP     Arrival Time : Patient will arrive at 2:30  PM    "

## 2018-07-11 ENCOUNTER — TRANSFERRED RECORDS (OUTPATIENT)
Dept: HEALTH INFORMATION MANAGEMENT | Facility: CLINIC | Age: 68
End: 2018-07-11

## 2018-07-16 ENCOUNTER — TRANSFERRED RECORDS (OUTPATIENT)
Dept: HEALTH INFORMATION MANAGEMENT | Facility: CLINIC | Age: 68
End: 2018-07-16

## 2018-07-16 ENCOUNTER — DOCUMENTATION ONLY (OUTPATIENT)
Dept: GASTROENTEROLOGY | Facility: OUTPATIENT CENTER | Age: 68
End: 2018-07-16
Payer: MEDICARE

## 2018-07-18 LAB — COPATH REPORT: NORMAL

## 2018-08-15 ENCOUNTER — OFFICE VISIT (OUTPATIENT)
Dept: FAMILY MEDICINE | Facility: CLINIC | Age: 68
End: 2018-08-15
Payer: COMMERCIAL

## 2018-08-15 VITALS
DIASTOLIC BLOOD PRESSURE: 83 MMHG | WEIGHT: 220 LBS | RESPIRATION RATE: 20 BRPM | BODY MASS INDEX: 36.33 KG/M2 | TEMPERATURE: 98.2 F | OXYGEN SATURATION: 96 % | HEART RATE: 90 BPM | SYSTOLIC BLOOD PRESSURE: 144 MMHG

## 2018-08-15 DIAGNOSIS — R14.1 FLATULENCE, ERUCTATION, AND GAS PAIN: Primary | ICD-10-CM

## 2018-08-15 DIAGNOSIS — R14.2 FLATULENCE, ERUCTATION, AND GAS PAIN: Primary | ICD-10-CM

## 2018-08-15 DIAGNOSIS — R14.3 FLATULENCE, ERUCTATION, AND GAS PAIN: Primary | ICD-10-CM

## 2018-08-15 PROCEDURE — 99214 OFFICE O/P EST MOD 30 MIN: CPT | Performed by: NURSE PRACTITIONER

## 2018-08-15 NOTE — PATIENT INSTRUCTIONS
"For your gas and bloatin. Increase your water intake to 60+ oz per day.  2. Increase your fiber intake in foods to 20-30gms per day. Read labels, eat fruits and vegetables, etc. Use your \"Myfitnesspal\" andre.  3. Consider adding Floragen 3, one per day (available in our pharmacy).  4. Increase your aerobic activity as tolerated.   5. Follow up in one month for a physical and/or abdominal work up.  6. If you develop any new or worsening abdominal symptoms, please be re-evaluated.      4 Steps for Eating Healthier  Changing the way you eat can improve your health. It can lower your cholesterol and blood pressure, and help you stay at a healthy weight. Your diet doesn t have to be bland and boring to be healthy. Just watch your calories and follow these steps:    Step 1. Eat fewer unhealthy fats    Choose more fish and lean meats instead of fatty cuts of meat.    Skip butter and lard, and use less margarine.    Pass on foods that have palm, coconut, or hydrogenated oils.    Eat fewer high-fat dairy foods like cheese, ice cream, and whole milk.    Get a heart-healthy cookbook and try some low-fat recipes.  Step 2. Go light on salt    Keep the saltshaker off the table.    Limit high-salt ingredients, such as soy sauce, bouillon, and garlic salt.    Instead of adding salt when cooking, season your food with herbs and flavorings. Try lemon, garlic, and onion, or salt-free herb seasonings.    Limit convenience foods, such as boxed or canned foods and restaurant food.    Read food labels and choose lower-sodium options.  Step 3. Limit sugar    Pause before you add sugars to pancakes, cereal, coffee, or tea. This includes white and brown table sugar, syrup, honey, and molasses. Cut your usual amount by half.    Use non-sugar sweeteners. Stevia, aspartame, and sucralose can satisfy a sweet tooth without adding calories.    Swap out sugar-filled soda and other drinks. Buy sugar-free or low-calorie beverages. Remember water " is always the best choice.    Read labels and choose foods with less added sugar. Keep in mind that dairy foods and foods with fruit will have some natural sugar.    Cut the sugar in recipes by 1/3 to 1/2. Boost the flavor with extracts like almond, vanilla, or orange. Or add spices such as cinnamon or nutmeg.  Step 4. Eat more fiber    Eat fresh fruits and vegetables every day.    Boost your diet with whole grains. Go for oats, whole-grain rice, and bran.    Add beans and lentils to your meals.    Drink more water to match your fiber increase to help prevent constipation.  Date Last Reviewed: 6/1/2017 2000-2017 [employer]. [address] [place] [address], [place] [id number]. All rights reserved. This information is not intended as a substitute for professional medical care. Always follow your healthcare professional's instructions.          Excess Gas  Certain foods produce gas when digested. In some people, these foods make an excessive amount of gas. This may cause bloating, burping, or increased gas passing through the rectum (flatulence).     Foods that cause gas  The following foods are more likely to cause this problem. Limit them, or remove them from your diet:    Broccoli    Cauliflower    [place] sprouts    Cabbage    Cooked dried beans    Fizzy (carbonated) drinks, such as sparkling water, soda, beer, and champagne  Other causes  Other causes of excess gas include:    Eating too fast or talking while you chew. This may cause you to swallow air. This increases the amount of gas in your stomach. And it may make your symptoms worse. Chew each mouthful completely before you swallow. Take your time.    Chewing on gum or sucking on hard candy. These cause you to swallow more often. And some of what you are swallowing is air. This leads to more gas in your stomach. Avoid chewing gum and hard candy.    Overeating. This may increase the feeling of being bloated and cause more gas. When you are full, stop  eating.     Being constipated. This can increase the amount of normal intestinal gas. Avoid constipation by getting more fiber in your diet. Good sources of fiber include whole-grain cereal, fresh vegetables (except those in the above list), and fresh fruits. High-fiber foods absorb water and carry it out of the body. When adding more fiber to your diet, you also need to drink more water. You should drink at least 8, 8-ounce glasses of water (2 quarts) per day.  Date Last Reviewed: 8/1/2016 2000-2017 [employer]. [address] [place] [address]. All rights reserved. This information is not intended as a substitute for professional medical care. Always follow your healthcare professional's instructions.

## 2018-08-15 NOTE — MR AVS SNAPSHOT
"              After Visit Summary   8/15/2018    Braeden Umana    MRN: [id number]           Patient Information     Date Of Birth          1950        Visit Information        Provider Department      8/15/2018 1:40 PM Elin Goldman APRN [facility] Instructions    For your gas and bloatin. Increase your water intake to 60+ oz per day.  2. Increase your fiber intake in foods to 20-30gms per day. Read labels, eat fruits and vegetables, etc. Use your \"Myfitnesspal\" andre.  3. Consider adding Floragen 3, one per day (available in our pharmacy).  4. Increase your aerobic activity as tolerated.   5. Follow up in one month for a physical and/or abdominal work up.  6. If you develop any new or worsening abdominal symptoms, please be re-evaluated.      4 Steps for Eating Healthier  Changing the way you eat can improve your health. It can lower your cholesterol and blood pressure, and help you stay at a healthy weight. Your diet doesn t have to be bland and boring to be healthy. Just watch your calories and follow these steps:    Step 1. Eat fewer unhealthy fats    Choose more fish and lean meats instead of fatty cuts of meat.    Skip butter and lard, and use less margarine.    Pass on foods that have palm, coconut, or hydrogenated oils.    Eat fewer high-fat dairy foods like cheese, ice cream, and whole milk.    Get a heart-healthy cookbook and try some low-fat recipes.  Step 2. Go light on salt    Keep the saltshaker off the table.    Limit high-salt ingredients, such as soy sauce, bouillon, and garlic salt.    Instead of adding salt when cooking, season your food with herbs and flavorings. Try lemon, garlic, and onion, or salt-free herb seasonings.    Limit convenience foods, such as boxed or canned foods and restaurant food.    Read food labels and choose lower-sodium options.  Step 3. Limit sugar    Pause before you add sugars to pancakes, cereal, coffee, or tea. " This includes white and brown table sugar, syrup, honey, and molasses. Cut your usual amount by half.    Use non-sugar sweeteners. Stevia, aspartame, and sucralose can satisfy a sweet tooth without adding calories.    Swap out sugar-filled soda and other drinks. Buy sugar-free or low-calorie beverages. Remember water is always the best choice.    Read labels and choose foods with less added sugar. Keep in mind that dairy foods and foods with fruit will have some natural sugar.    Cut the sugar in recipes by 1/3 to 1/2. Boost the flavor with extracts like almond, vanilla, or orange. Or add spices such as cinnamon or nutmeg.  Step 4. Eat more fiber    Eat fresh fruits and vegetables every day.    Boost your diet with whole grains. Go for oats, whole-grain rice, and bran.    Add beans and lentils to your meals.    Drink more water to match your fiber increase to help prevent constipation.  Date Last Reviewed: 6/1/2017 2000-2017 [employer]. [address] [place] [address]. All rights reserved. This information is not intended as a substitute for professional medical care. Always follow your healthcare professional's instructions.          Excess Gas  Certain foods produce gas when digested. In some people, these foods make an excessive amount of gas. This may cause bloating, burping, or increased gas passing through the rectum (flatulence).     Foods that cause gas  The following foods are more likely to cause this problem. Limit them, or remove them from your diet:    Broccoli    Cauliflower    [place] sprouts    Cabbage    Cooked dried beans    Fizzy (carbonated) drinks, such as sparkling water, soda, beer, and champagne  Other causes  Other causes of excess gas include:    Eating too fast or talking while you chew. This may cause you to swallow air. This increases the amount of gas in your stomach. And it may make your symptoms worse. Chew each mouthful completely before you swallow. Take  your time.    Chewing on gum or sucking on hard candy. These cause you to swallow more often. And some of what you are swallowing is air. This leads to more gas in your stomach. Avoid chewing gum and hard candy.    Overeating. This may increase the feeling of being bloated and cause more gas. When you are full, stop eating.     Being constipated. This can increase the amount of normal intestinal gas. Avoid constipation by getting more fiber in your diet. Good sources of fiber include whole-grain cereal, fresh vegetables (except those in the above list), and fresh fruits. High-fiber foods absorb water and carry it out of the body. When adding more fiber to your diet, you also need to drink more water. You should drink at least 8, 8-ounce glasses of water (2 quarts) per day.  Date Last Reviewed: 8/1/2016 2000-2017 [employer]. [address] [place] [address]. All rights reserved. This information is not intended as a substitute for professional medical care. Always follow your healthcare professional's instructions.                Follow-ups after your visit        Who to contact     If you have questions or need follow up information about today's clinic visit or your schedule please contact [facility] directly at [phone].  Normal or non-critical lab and imaging results will be communicated to you by [employer]ehart, letter or phone within 4 business days after the clinic has received the results. If you do not hear from us within 7 days, please contact the clinic through [employer] or phone. If you have a critical or abnormal lab result, we will notify you by phone as soon as possible.  Submit refill requests through [facility] or call your pharmacy and they will forward the refill request to us. Please allow 3 business days for your refill to be completed.          Additional Information About Your Visit        [facility] Information     [facility] gives you secure access to your  electronic health record. If you see a primary care provider, you can also send messages to your care team and make appointments. If you have questions, please call your primary care clinic.  If you do not have a primary care provider, please call [phone] and they will assist you.        Care EveryWhere ID     This is your Care EveryWhere ID. This could be used by other organizations to access your [place] medical records  [id number]        Your Vitals Were     Pulse Temperature Respirations Pulse Oximetry BMI (Body Mass Index)       90 98.2  F (36.8  C) (Oral) 20 96% 36.33 kg/m2        Blood Pressure from Last 3 Encounters:   08/15/18 144/83   06/28/18 128/66   06/05/18 130/74    Weight from Last 3 Encounters:   08/15/18 220 lb (99.8 kg)   06/28/18 216 lb 6.4 oz (98.2 kg)   06/05/18 216 lb 2 oz (98 kg)              Today, you had the following     No orders found for display         Today's Medication Changes          These changes are accurate as of 8/15/18  2:19 PM.  If you have any questions, ask your nurse or doctor.               These medicines have changed or have updated prescriptions.        Dose/Directions    losartan 50 MG tablet   Commonly known as:  COZAAR   This may have changed:  how much to take   Used for:  Hypertension goal BP (blood pressure) < 140/90        Dose:  50 mg   Take 1 tablet (50 mg) by mouth daily   Quantity:  90 tablet   Refills:  PRN                Primary Care Provider Office Phone # Fax #    Treva Saleem [phone]       [address] [facility] [address]        Equal Access to Services     [facility] AH: Hadii lainey butler hadasho Soomaali, waaxda luqadaha, qaybta kaalmada didi jha . So [facility] [phone].    ATENCIÓN: Si habla español, tiene a guajardo disposición servicios gratuitos de asistencia lingüística. Llame al [phone].    We comply with applicable federal civil rights laws and [place] laws. We do  not discriminate on the basis of race, color, national origin, age, disability, sex, sexual orientation, or gender identity.            Thank you!     Thank you for choosing [facility]  for your care. Our goal is always to provide you with excellent care. Hearing back from our patients is one way we can continue to improve our services. Please take a few minutes to complete the written survey that you may receive in the mail after your visit with us. Thank you!             Your Updated Medication List - Protect others around you: Learn how to safely use, store and throw away your medicines at www.disposemymeds.org.          This list is accurate as of 8/15/18  2:19 PM.  Always use your most recent med list.                   Brand Name Dispense Instructions for use Diagnosis    ALPRAZolam 0.25 MG tablet    XANAX     Take 0.25 mg by mouth 3 times daily as needed.        amLODIPine 10 MG tablet    NORVASC    90 tablet    Take 1 tablet (10 mg) by mouth daily    Hypertension goal BP (blood pressure) < 140/90       aspirin 81 MG EC tablet     90 tablet    Take 1 tablet (81 mg) by mouth daily        atenolol 50 MG tablet    TENORMIN    135 tablet    Take 1 tablet (50 mg) by mouth daily    Hypertension goal BP (blood pressure) < 140/90       BENADRYL 25 MG tablet   Generic drug:  diphenhydrAMINE      Take 25 mg by mouth every 6 hours as needed.        clopidogrel 75 MG tablet    PLAVIX    90 tablet    ONE DAILY    Atherosclerosis of coronary artery of native heart, angina presence unspecified, unspecified vessel or lesion type       CYMBALTA 60 MG EC capsule   Generic drug:  DULoxetine     0    1 CAPSULE ONCE DAILY        diclofenac 1 % Gel topical gel    VOLTAREN    100 g    Apply 2 grams to left ankle four times daily as needed using enclosed dosing card.    Posterior tibial tendon dysfunction       ezetimibe 10 MG tablet    ZETIA    90 tablet    Take 1 tablet (10 mg) by mouth daily    Hyperlipidemia  LDL goal <70       losartan 50 MG tablet    COZAAR    90 tablet    Take 1 tablet (50 mg) by mouth daily    Hypertension goal BP (blood pressure) < 140/90       NEURONTIN 300 MG capsule   Generic drug:  gabapentin     90 capsule    Take 300 mg by mouth daily Take 3 tablets daily        nitroGLYcerin 0.4 MG sublingual tablet    NITROSTAT    1 tablet    Place 0.4 mg under the tongue every 5 minutes as needed for chest pain Reported on 5/22/2017        QUEtiapine 25 MG tablet    SEROquel     Take 25 mg by mouth Take 1 to 2 tablets by mouth at bedtime    Diplopia, Memory loss, Fatigue, unspecified type       ranitidine 150 MG tablet    ZANTAC    60 tablet    Take 1 tablet (150 mg) by mouth 2 times daily    Gastroesophageal reflux disease, esophagitis presence not specified       STATIN NOT PRESCRIBED (INTENTIONAL)      Please choose reason not prescribed, below    Atherosclerosis of coronary artery of native heart, angina presence unspecified, unspecified vessel or lesion type

## 2018-08-15 NOTE — PROGRESS NOTES
"  SUBJECTIVE:   Braeden Umana is a 68 year old male who presents to clinic today for the following health issues:  Chief Complaint   Patient presents with     Gastric Problem       Chronic digestive issues, getting worse.  Cramping, bloating, gas.  \"Sometimes my stomach is as hard as a rock.\"  30 min after a meal he will have lower/mid abdominal pain and cramping.   No diet changes.  Colonoscopy 1 month ago, polyp but no other significant finding.  The colonoscopy did show diverticulosis.  Stools are more constipated.  He does not have the sensation of a complete bowel movement.     Diet:  Smaller breakfast and lunch.  Eating processed foods.      Problem list and histories reviewed & adjusted, as indicated.  Additional history: as documented    Patient Active Problem List   Diagnosis     Coronary atherosclerosis     Hypertrophy (benign) of prostate     Idiopathic urticaria     ANXIETY      Esophageal reflux     Idiopathic angioedema     Mild recurrent major depression (H)     Hyperlipidemia LDL goal <70     Carotid artery stenosis     Advanced directives, counseling/discussion     Hypertension goal BP (blood pressure) < 140/90     Achilles bursitis or tendinitis     Sleep apnea     BMI >35     Cervical pain     Shoulder pain, left     Past Surgical History:   Procedure Laterality Date     SURGICAL HISTORY OF -       sinus surgery     SURGICAL HISTORY OF -       angiogram and stent placement     SURGICAL HISTORY OF -       orchioplexy     SURGICAL HISTORY OF -   12/31/12    angiogram and 4 stents     SURGICAL HISTORY OF -   2/26/13    angiogram and stent     SURGICAL HISTORY OF -   1/15    angiogram and stents     SURGICAL HISTORY OF -   5/15    angiogram and stent       Social History   Substance Use Topics     Smoking status: Former Smoker     Smokeless tobacco: Never Used      Comment: x30 years+     Alcohol use No     Family History   Problem Relation Age of Onset     C.A.D. Mother      bypass     " Hypertension Mother      C.A.D. Father      late 50's     Hypertension Father      Neurologic Disorder Father      parkinsons     C.ALAURA. Brother      x2/CAD angioplasty age 54     Allergies Brother      Diabetes No family hx of      Cerebrovascular Disease No family hx of      Cancer - colorectal No family hx of      Prostate Cancer No family hx of          Current Outpatient Prescriptions   Medication Sig Dispense Refill     ALPRAZolam (XANAX) 0.25 MG tablet Take 0.25 mg by mouth 3 times daily as needed.       amLODIPine (NORVASC) 10 MG tablet Take 1 tablet (10 mg) by mouth daily 90 tablet 3     aspirin 81 MG EC tablet Take 1 tablet (81 mg) by mouth daily 90 tablet 3     clopidogrel (PLAVIX) 75 MG tablet ONE DAILY 90 tablet PRN     CYMBALTA 60 MG OR CPEP 1 CAPSULE ONCE DAILY 0 0     diclofenac (VOLTAREN) 1 % GEL topical gel Apply 2 grams to left ankle four times daily as needed using enclosed dosing card. 100 g 0     diphenhydrAMINE (BENADRYL) 25 MG tablet Take 25 mg by mouth every 6 hours as needed.       ezetimibe (ZETIA) 10 MG tablet Take 1 tablet (10 mg) by mouth daily 90 tablet PRN     gabapentin (NEURONTIN) 300 MG capsule Take 300 mg by mouth daily Take 3 tablets daily 90 capsule      losartan (COZAAR) 50 MG tablet Take 1 tablet (50 mg) by mouth daily (Patient taking differently: Take 25 mg by mouth daily ) 90 tablet PRN     nitroglycerin (NITROSTAT) 0.4 MG SL tablet Place 0.4 mg under the tongue every 5 minutes as needed for chest pain Reported on 5/22/2017 1 tablet 0     QUEtiapine (SEROQUEL) 25 MG tablet Take 25 mg by mouth Take 1 to 2 tablets by mouth at bedtime       ranitidine (ZANTAC) 150 MG tablet Take 1 tablet (150 mg) by mouth 2 times daily 60 tablet PRN     STATIN NOT PRESCRIBED, INTENTIONAL, Please choose reason not prescribed, below       atenolol (TENORMIN) 50 MG tablet Take 1 tablet (50 mg) by mouth daily (Patient not taking: Reported on 8/15/2018) 135 tablet 0     Allergies   Allergen  Reactions     Contrast Dye Other (See Comments)     IV contrast dye-total cardiac arrest     Apple Hives     angioedema     Cashews [Nuts] Hives     Angio edema     Citric Acid      Codeine      sensitive     Coreg [Carvedilol] Difficulty breathing     Keflex [Cephalexin Monohydrate] Swelling     Angioedema - possibly due to Keflex     [place] Oil Hives     angioedema     Shrimp Hives     angioedema     Simvastatin      Zocor      Recent Labs   Lab Test  03/20/18   1645  12/12/17   1411 05/27/16   12/15/15   0753  04/28/15 02/18/15   09/08/10   1443   A1C   --   5.7   --    --    --    --    --    --    --    --    LDL  90   --    --    --   98   --   59  103   < >   --    HDL  19*   --    --    --   29*   --   27  26   < >   --    TRIG  220*   --    --    --   177*   --   102  100   < >   --    ALT   --   32   --    --    --    --   25  21   < >  13   CR   --   1.09  1.32   < >   --    < >  0.96   --    < >  1.18   GFRESTIMATED   --   67  54   < >   --    < >  >60   --    < >  63   GFRESTBLACK   --   81  >60   < >   --    < >   --    --    < >  76   POTASSIUM   --   4.0  3.9   < >   --    < >  4.3   --    < >  4.7   TSH   --   1.58   --    --    --    --    --    --    --   1.38    < > = values in this interval not displayed.      BP Readings from Last 3 Encounters:   08/15/18 144/83   06/28/18 128/66   06/05/18 130/74    Wt Readings from Last 3 Encounters:   08/15/18 220 lb (99.8 kg)   06/28/18 216 lb 6.4 oz (98.2 kg)   06/05/18 216 lb 2 oz (98 kg)                  Labs reviewed in EPIC    Reviewed and updated as needed this visit by clinical staff  Allergies  Meds       Reviewed and updated as needed this visit by Provider         ROS:  Constitutional, HEENT, cardiovascular, pulmonary, GI, , musculoskeletal, neuro, skin, endocrine and psych systems are negative, except as otherwise noted.    OBJECTIVE:     /83  Pulse 90  Temp 98.2  F (36.8  C) (Oral)  Resp 20  Wt 220 lb (99.8 kg)  SpO2 96%  BMI  "36.33 kg/m2  Body mass index is 36.33 kg/(m^2).  GENERAL: healthy, alert and no distress  EYES: Eyes grossly normal to inspection, PERRL and conjunctivae and sclerae normal  NECK: no adenopathy, no asymmetry, masses, or scars and thyroid normal to palpation  RESP: lungs clear to auscultation - no rales, rhonchi or wheezes  CV: regular rate and rhythm, normal S1 S2, no S3 or S4, no murmur, click or rub, no peripheral edema and peripheral pulses strong  ABDOMEN: soft, nontender, no hepatosplenomegaly, no masses and bowel sounds normal. No HSM. No rebound or guarding.  MS: no gross musculoskeletal defects noted, no edema  SKIN: no suspicious lesions or rashes  NEURO: Normal strength and tone, mentation intact and speech normal  PSYCH: mentation appears normal, affect normal/bright      ASSESSMENT/PLAN:     (R14.3,  R14.1,  R14.2) Flatulence, eructation, and gas pain  (primary encounter diagnosis)  Comment: differentials: viral gastroenteritis, food intolerances, constipation, food allergies, etc  Plan:     Patient Instructions   For your gas and bloatin. Increase your water intake to 60+ oz per day.  2. Increase your fiber intake in foods to 20-30gms per day. Read labels, eat fruits and vegetables, etc. Use your \"Myfitnesspal\" andre.  3. Consider adding Floragen 3, one per day (available in our pharmacy).  4. Increase your aerobic activity as tolerated.   5. Follow up in one month for a physical and/or abdominal work up.  6. If you develop any new or worsening abdominal symptoms, please be re-evaluated.      4 Steps for Eating Healthier  Changing the way you eat can improve your health. It can lower your cholesterol and blood pressure, and help you stay at a healthy weight. Your diet doesn t have to be bland and boring to be healthy. Just watch your calories and follow these steps:    Step 1. Eat fewer unhealthy fats    Choose more fish and lean meats instead of fatty cuts of meat.    Skip butter and lard, and use " less margarine.    Pass on foods that have palm, coconut, or hydrogenated oils.    Eat fewer high-fat dairy foods like cheese, ice cream, and whole milk.    Get a heart-healthy cookbook and try some low-fat recipes.  Step 2. Go light on salt    Keep the saltshaker off the table.    Limit high-salt ingredients, such as soy sauce, bouillon, and garlic salt.    Instead of adding salt when cooking, season your food with herbs and flavorings. Try lemon, garlic, and onion, or salt-free herb seasonings.    Limit convenience foods, such as boxed or canned foods and restaurant food.    Read food labels and choose lower-sodium options.  Step 3. Limit sugar    Pause before you add sugars to pancakes, cereal, coffee, or tea. This includes white and brown table sugar, syrup, honey, and molasses. Cut your usual amount by half.    Use non-sugar sweeteners. Stevia, aspartame, and sucralose can satisfy a sweet tooth without adding calories.    Swap out sugar-filled soda and other drinks. Buy sugar-free or low-calorie beverages. Remember water is always the best choice.    Read labels and choose foods with less added sugar. Keep in mind that dairy foods and foods with fruit will have some natural sugar.    Cut the sugar in recipes by 1/3 to 1/2. Boost the flavor with extracts like almond, vanilla, or orange. Or add spices such as cinnamon or nutmeg.  Step 4. Eat more fiber    Eat fresh fruits and vegetables every day.    Boost your diet with whole grains. Go for oats, whole-grain rice, and bran.    Add beans and lentils to your meals.    Drink more water to match your fiber increase to help prevent constipation.  Date Last Reviewed: 6/1/2017 2000-2017 [employer]. [address] [place] [address], [place] [address]. All rights reserved. This information is not intended as a substitute for professional medical care. Always follow your healthcare professional's instructions.          Excess Gas  Certain foods produce gas when  digested. In some people, these foods make an excessive amount of gas. This may cause bloating, burping, or increased gas passing through the rectum (flatulence).     Foods that cause gas  The following foods are more likely to cause this problem. Limit them, or remove them from your diet:    Broccoli    Cauliflower    [place] sprouts    Cabbage    Cooked dried beans    Fizzy (carbonated) drinks, such as sparkling water, soda, beer, and champagne  Other causes  Other causes of excess gas include:    Eating too fast or talking while you chew. This may cause you to swallow air. This increases the amount of gas in your stomach. And it may make your symptoms worse. Chew each mouthful completely before you swallow. Take your time.    Chewing on gum or sucking on hard candy. These cause you to swallow more often. And some of what you are swallowing is air. This leads to more gas in your stomach. Avoid chewing gum and hard candy.    Overeating. This may increase the feeling of being bloated and cause more gas. When you are full, stop eating.     Being constipated. This can increase the amount of normal intestinal gas. Avoid constipation by getting more fiber in your diet. Good sources of fiber include whole-grain cereal, fresh vegetables (except those in the above list), and fresh fruits. High-fiber foods absorb water and carry it out of the body. When adding more fiber to your diet, you also need to drink more water. You should drink at least 8, 8-ounce glasses of water (2 quarts) per day.  Date Last Reviewed: 8/1/2016 2000-2017 [employer]. [address] [place] [address], [place] [id number]. All rights reserved. This information is not intended as a substitute for professional medical care. Always follow your healthcare professional's instructions.          I spent a total of 30 min with the patient, >50% time spent face to face counseling regarding the above issues, establishing a plan of care, and coordinating  follow up care.     JEREMY Cuevas [facility]

## 2018-09-26 ENCOUNTER — TRANSFERRED RECORDS (OUTPATIENT)
Dept: HEALTH INFORMATION MANAGEMENT | Facility: CLINIC | Age: 68
End: 2018-09-26

## 2018-10-12 ENCOUNTER — OFFICE VISIT (OUTPATIENT)
Dept: URGENT CARE | Facility: URGENT CARE | Age: 68
End: 2018-10-12
Payer: COMMERCIAL

## 2018-10-12 VITALS
HEIGHT: 67 IN | RESPIRATION RATE: 16 BRPM | BODY MASS INDEX: 34.53 KG/M2 | OXYGEN SATURATION: 95 % | DIASTOLIC BLOOD PRESSURE: 76 MMHG | TEMPERATURE: 99.9 F | HEART RATE: 114 BPM | WEIGHT: 220 LBS | SYSTOLIC BLOOD PRESSURE: 138 MMHG

## 2018-10-12 DIAGNOSIS — J01.00 ACUTE NON-RECURRENT MAXILLARY SINUSITIS: Primary | ICD-10-CM

## 2018-10-12 PROCEDURE — 99213 OFFICE O/P EST LOW 20 MIN: CPT | Performed by: PEDIATRICS

## 2018-10-12 NOTE — PROGRESS NOTES
"SUBJECTIVE:  Braeden Umana is a 68 year old male here with concerns about sinus infection.  He states onset of symptoms were 2 week(s) ago.  He has had maxillary pressure. Course of illness is worsening. Severity moderately severe  Current and Associated symptoms: fever, nasal congestion, rhinorrhea and cough   Predisposing factors include none. Recent treatment has included: None    Past Medical History:   Diagnosis Date     Angioedema 8/10     Coronary atherosclerosis of unspecified type of vessel, native or graft      Depressive disorder, not elsewhere classified      Hypertrophy (benign) of prostate      Idiopathic urticaria      MI (myocardial infarction) (H) 1/15     Other anxiety states      Pure hypercholesterolemia      Unspecified sleep apnea      Social History   Substance Use Topics     Smoking status: Former Smoker     Smokeless tobacco: Never Used      Comment: x30 years+     Alcohol use No       ROS:  EYES: NEGATIVE for vision changes or irritation  CV: NEGATIVE for chest pain, palpitations or peripheral edema  GI: NEGATIVE for nausea, abdominal pain, heartburn, or change in bowel habits  MUSCULOSKELETAL: NEGATIVE for significant arthralgias or myalgia  NEURO: NEGATIVE for weakness, dizziness or paresthesias    OBJECTIVE:  /76  Pulse 114  Temp 99.9  F (37.7  C) (Oral)  Resp 16  Ht 5' 7\" (1.702 m)  Wt 220 lb (99.8 kg)  SpO2 95%  BMI 34.46 kg/m2  Exam:GENERAL APPEARANCE: healthy, alert and no distress  HENT: ear canals and TM's normal.  Nose and mouth without ulcers, erythema or lesions. Maxillary tenderness  RESP: lungs clear to auscultation - no rales, rhonchi or wheezes  CV: regular rates and rhythm, normal S1 S2, no murmur noted  NEURO: Normal strength and tone, sensory exam grossly normal,  normal speech and mentation  SKIN: no suspicious lesions or rashes    ASSESSMENT:  Sinusitis  See diagnosis    PLAN:  See orders  Follow up with primary clinic if not improving    "

## 2018-10-12 NOTE — MR AVS SNAPSHOT
"              After Visit Summary   10/12/2018    Braeden Umana    MRN: [id number]           Patient Information     Date Of Birth          1950        Visit Information        Provider Department      10/12/2018 5:55 PM Singh Guerrero MD [facility]        Today's Diagnoses     Acute non-recurrent maxillary sinusitis    -  1       Follow-ups after your visit        Who to contact     If you have questions or need follow up information about today's clinic visit or your schedule please contact [facility] directly at [phone].  Normal or non-critical lab and imaging results will be communicated to you by Critical Pharmaceuticalshart, letter or phone within 4 business days after the clinic has received the results. If you do not hear from us within 7 days, please contact the clinic through Nine Start or phone. If you have a critical or abnormal lab result, we will notify you by phone as soon as possible.  Submit refill requests through Aubrey or call your pharmacy and they will forward the refill request to us. Please allow 3 business days for your refill to be completed.          Additional Information About Your Visit        MyChart Information     Aubrey gives you secure access to your electronic health record. If you see a primary care provider, you can also send messages to your care team and make appointments. If you have questions, please call your primary care clinic.  If you do not have a primary care provider, please call [phone] and they will assist you.        Care EveryWhere ID     This is your Care EveryWhere ID. This could be used by other organizations to access your Agency medical records  [id number]        Your Vitals Were     Pulse Temperature Respirations Height Pulse Oximetry BMI (Body Mass Index)    114 99.9  F (37.7  C) (Oral) 16 5' 7\" (1.702 m) 95% 34.46 kg/m2       Blood Pressure from Last 3 Encounters:   10/12/18 138/76   08/15/18 144/83   06/28/18 " 128/66    Weight from Last 3 Encounters:   10/12/18 220 lb (99.8 kg)   08/15/18 220 lb (99.8 kg)   06/28/18 216 lb 6.4 oz (98.2 kg)              Today, you had the following     No orders found for display         Today's Medication Changes          These changes are accurate as of 10/12/18  6:47 PM.  If you have any questions, ask your nurse or doctor.               Start taking these medicines.        Dose/Directions    amoxicillin-clavulanate 875-125 MG per tablet   Commonly known as:  AUGMENTIN   Used for:  Acute non-recurrent maxillary sinusitis   Started by:  Singh Guerrero MD        Dose:  1 tablet   Take 1 tablet by mouth 2 times daily   Quantity:  20 tablet   Refills:  0         These medicines have changed or have updated prescriptions.        Dose/Directions    losartan 50 MG tablet   Commonly known as:  COZAAR   This may have changed:  how much to take   Used for:  Hypertension goal BP (blood pressure) < 140/90        Dose:  50 mg   Take 1 tablet (50 mg) by mouth daily   Quantity:  90 tablet   Refills:  PRN         Stop taking these medicines if you haven't already. Please contact your care team if you have questions.     atenolol 50 MG tablet   Commonly known as:  TENORMIN   Stopped by:  Singh Guerrero MD                Where to get your medicines      These medications were sent to [facility] [address] [place] [address] [address] [facility]  [address] [facility] [address]     Phone:  [phone]     amoxicillin-clavulanate 875-125 MG per tablet                Primary Care Provider Office Phone # Fax #    Treva Saleem [phone] [address] [facility] [facility] [address]        Equal Access to Services     [facility] AH: Hadii lainey butler hadasho Soyvette, waaxda luqadaha, qaybta kaalmada adequangyajuan diego, didi antoine. So [facility] [phone].    ATENCIÓN: Si habla español, tiene a guajardo disposición servicios gratuitos de asistencia lingüística. Llame al  [phone].    We comply with applicable federal civil rights laws and [place] laws. We do not discriminate on the basis of race, color, national origin, age, disability, sex, sexual orientation, or gender identity.            Thank you!     Thank you for choosing [facility]  for your care. Our goal is always to provide you with excellent care. Hearing back from our patients is one way we can continue to improve our services. Please take a few minutes to complete the written survey that you may receive in the mail after your visit with us. Thank you!             Your Updated Medication List - Protect others around you: Learn how to safely use, store and throw away your medicines at www.disposemymeds.org.          This list is accurate as of 10/12/18  6:47 PM.  Always use your most recent med list.                   Brand Name Dispense Instructions for use Diagnosis    ALPRAZolam 0.25 MG tablet    XANAX     Take 0.25 mg by mouth 3 times daily as needed.        amLODIPine 10 MG tablet    NORVASC    90 tablet    Take 1 tablet (10 mg) by mouth daily    Hypertension goal BP (blood pressure) < 140/90       amoxicillin-clavulanate 875-125 MG per tablet    AUGMENTIN    20 tablet    Take 1 tablet by mouth 2 times daily    Acute non-recurrent maxillary sinusitis       aspirin 81 MG EC tablet     90 tablet    Take 1 tablet (81 mg) by mouth daily        BENADRYL 25 MG tablet   Generic drug:  diphenhydrAMINE      Take 25 mg by mouth every 6 hours as needed.        clopidogrel 75 MG tablet    PLAVIX    90 tablet    ONE DAILY    Atherosclerosis of coronary artery of native heart, angina presence unspecified, unspecified vessel or lesion type       CYMBALTA 60 MG EC capsule   Generic drug:  DULoxetine     0    1 CAPSULE ONCE DAILY        diclofenac 1 % Gel topical gel    VOLTAREN    100 g    Apply 2 grams to left ankle four times daily as needed using enclosed dosing card.    Posterior tibial tendon  dysfunction       ezetimibe 10 MG tablet    ZETIA    90 tablet    Take 1 tablet (10 mg) by mouth daily    Hyperlipidemia LDL goal <70       losartan 50 MG tablet    COZAAR    90 tablet    Take 1 tablet (50 mg) by mouth daily    Hypertension goal BP (blood pressure) < 140/90       NEURONTIN 300 MG capsule   Generic drug:  gabapentin     90 capsule    Take 300 mg by mouth daily Take 3 tablets daily        nitroGLYcerin 0.4 MG sublingual tablet    NITROSTAT    1 tablet    Place 0.4 mg under the tongue every 5 minutes as needed for chest pain Reported on 5/22/2017        order for DME     1 Device    Equipment being ordered: CPAP REPLACEMENT MASK FAX TO [facility] [phone]    Sleep apnea       QUEtiapine 25 MG tablet    SEROquel     Take 25 mg by mouth Take 1 to 2 tablets by mouth at bedtime    Diplopia, Memory loss, Fatigue, unspecified type       ranitidine 150 MG tablet    ZANTAC    60 tablet    Take 1 tablet (150 mg) by mouth 2 times daily    Gastroesophageal reflux disease, esophagitis presence not specified       STATIN NOT PRESCRIBED (INTENTIONAL)      Please choose reason not prescribed, below    Atherosclerosis of coronary artery of native heart, angina presence unspecified, unspecified vessel or lesion type

## 2018-10-22 ENCOUNTER — E-VISIT (OUTPATIENT)
Dept: FAMILY MEDICINE | Facility: CLINIC | Age: 68
End: 2018-10-22
Payer: COMMERCIAL

## 2018-10-22 ENCOUNTER — MYC MEDICAL ADVICE (OUTPATIENT)
Dept: FAMILY MEDICINE | Facility: CLINIC | Age: 68
End: 2018-10-22

## 2018-10-22 DIAGNOSIS — J01.00 ACUTE NON-RECURRENT MAXILLARY SINUSITIS: ICD-10-CM

## 2018-10-22 NOTE — TELEPHONE ENCOUNTER
My chart message sent   F/u to sinusitis urgent care visit 10/12/18   Advised regla Henson Registered Nurse   [facility]

## 2018-10-25 ENCOUNTER — OFFICE VISIT (OUTPATIENT)
Dept: FAMILY MEDICINE | Facility: CLINIC | Age: 68
End: 2018-10-25
Payer: COMMERCIAL

## 2018-10-25 ENCOUNTER — RADIANT APPOINTMENT (OUTPATIENT)
Dept: GENERAL RADIOLOGY | Facility: CLINIC | Age: 68
End: 2018-10-25
Attending: NURSE PRACTITIONER
Payer: COMMERCIAL

## 2018-10-25 VITALS
OXYGEN SATURATION: 97 % | DIASTOLIC BLOOD PRESSURE: 89 MMHG | BODY MASS INDEX: 34.3 KG/M2 | HEART RATE: 103 BPM | WEIGHT: 219 LBS | RESPIRATION RATE: 18 BRPM | TEMPERATURE: 97.8 F | SYSTOLIC BLOOD PRESSURE: 157 MMHG

## 2018-10-25 DIAGNOSIS — R55 SYNCOPE, UNSPECIFIED SYNCOPE TYPE: ICD-10-CM

## 2018-10-25 DIAGNOSIS — J01.00 ACUTE MAXILLARY SINUSITIS, RECURRENCE NOT SPECIFIED: Primary | ICD-10-CM

## 2018-10-25 LAB
BASOPHILS # BLD AUTO: 0.1 10E9/L (ref 0–0.2)
BASOPHILS NFR BLD AUTO: 0.8 %
DIFFERENTIAL METHOD BLD: ABNORMAL
EOSINOPHIL # BLD AUTO: 0.4 10E9/L (ref 0–0.7)
EOSINOPHIL NFR BLD AUTO: 3.8 %
ERYTHROCYTE [DISTWIDTH] IN BLOOD BY AUTOMATED COUNT: 14.5 % (ref 10–15)
HCT VFR BLD AUTO: 46.1 % (ref 40–53)
HGB BLD-MCNC: 14.8 G/DL (ref 13.3–17.7)
LYMPHOCYTES # BLD AUTO: 2.2 10E9/L (ref 0.8–5.3)
LYMPHOCYTES NFR BLD AUTO: 19.3 %
MCH RBC QN AUTO: 28.7 PG (ref 26.5–33)
MCHC RBC AUTO-ENTMCNC: 32.1 G/DL (ref 31.5–36.5)
MCV RBC AUTO: 90 FL (ref 78–100)
MONOCYTES # BLD AUTO: 1 10E9/L (ref 0–1.3)
MONOCYTES NFR BLD AUTO: 8.8 %
NEUTROPHILS # BLD AUTO: 7.5 10E9/L (ref 1.6–8.3)
NEUTROPHILS NFR BLD AUTO: 67.3 %
PLATELET # BLD AUTO: 386 10E9/L (ref 150–450)
RBC # BLD AUTO: 5.15 10E12/L (ref 4.4–5.9)
WBC # BLD AUTO: 11.2 10E9/L (ref 4–11)

## 2018-10-25 PROCEDURE — 85025 COMPLETE CBC W/AUTO DIFF WBC: CPT | Performed by: NURSE PRACTITIONER

## 2018-10-25 PROCEDURE — 71046 X-RAY EXAM CHEST 2 VIEWS: CPT

## 2018-10-25 PROCEDURE — 93000 ELECTROCARDIOGRAM COMPLETE: CPT | Performed by: NURSE PRACTITIONER

## 2018-10-25 PROCEDURE — 99214 OFFICE O/P EST MOD 30 MIN: CPT | Performed by: NURSE PRACTITIONER

## 2018-10-25 PROCEDURE — 80048 BASIC METABOLIC PNL TOTAL CA: CPT | Performed by: NURSE PRACTITIONER

## 2018-10-25 PROCEDURE — 36415 COLL VENOUS BLD VENIPUNCTURE: CPT | Performed by: NURSE PRACTITIONER

## 2018-10-25 RX ORDER — DOXYCYCLINE 100 MG/1
100 CAPSULE ORAL 2 TIMES DAILY
Qty: 10 CAPSULE | Refills: 0 | Status: SHIPPED | OUTPATIENT
Start: 2018-10-25 | End: 2018-10-25

## 2018-10-25 RX ORDER — DOXYCYCLINE 100 MG/1
100 CAPSULE ORAL 2 TIMES DAILY
Qty: 20 CAPSULE | Refills: 0 | Status: SHIPPED | OUTPATIENT
Start: 2018-10-25 | End: 2018-12-04

## 2018-10-25 NOTE — PROGRESS NOTES
"  SUBJECTIVE:   Braeden Umana is a 68 year old male who presents to clinic today for the following health issues:      Sinus Problem       Duration: several weeks. Has been on medication for 2 weeks.    Was seen in urgent care     Description  Some facial pain/pressure, exhausted, persistent cough, post nasal drip, \"going down hill\". Fainted last night.     Severity: severe    Accompanying signs and symptoms: intermittent low grade fever    History (predisposing factors):  none    Precipitating or alleviating factors: None    Therapies tried and outcome:  Currently taking Augmentin     He felt like symptoms had improved with Augmentin on the last day (10/22) but did not resolve.  He was off for 24 hours and then the prescription was extended for another week.  He is on day 3, but symptoms are worsening.   Cough is worsening.  Nasal drainage, post nasal drainage is worsening.   No shortness of breath or chest pain.  Fatigue, exhaustion.    He did feel lightheaded when he up and started walking last night and passed out.  He has CAD.  He did have an event monitor after a hospitalization last year which was normal.  He had carotid endartectomy 6 months ago.           Problem list and histories reviewed & adjusted, as indicated.  Additional history: as documented        Reviewed and updated as needed this visit by clinical staff       Reviewed and updated as needed this visit by Provider             OBJECTIVE:     /89 (BP Location: Right arm, Patient Position: Standing, Cuff Size: Adult Large)  Pulse 103  Temp 97.8  F (36.6  C) (Oral)  Resp 18  Wt 219 lb (99.3 kg)  SpO2 97%  BMI 34.3 kg/m2  Body mass index is 34.3 kg/(m^2).  GENERAL: alert and no distress  EYES: Eyes grossly normal to inspection, PERRL and conjunctivae and sclerae normal  HENT: normal cephalic/atraumatic, ear canals and TM's normal, nose and mouth without ulcers or lesions, oropharynx clear, oral mucous membranes moist and sinuses: " ethmoid tenderness on bilaterally  NECK: no adenopathy, no asymmetry, masses, or scars and thyroid normal to palpation  RESP: lungs clear to auscultation - no rales, rhonchi or wheezes  CV: regular rate and rhythm, normal S1 S2, no S3 or S4, no murmur, click or rub, no peripheral edema and peripheral pulses strong  ABDOMEN: soft, nontender, no hepatosplenomegaly, no masses and bowel sounds normal  MS: no gross musculoskeletal defects noted, no edema  NEURO: Normal strength and tone, mentation intact and speech normal  PSYCH: mentation appears normal, affect normal/bright    Diagnostic Test Results:  Results for orders placed or performed in visit on 10/25/18 (from the past 24 hour(s))   CBC with platelets differential   Result Value Ref Range    WBC 11.2 (H) 4.0 - 11.0 10e9/L    RBC Count 5.15 4.4 - 5.9 10e12/L    Hemoglobin 14.8 13.3 - 17.7 g/dL    Hematocrit 46.1 40.0 - 53.0 %    MCV 90 78 - 100 fl    MCH 28.7 26.5 - 33.0 pg    MCHC 32.1 31.5 - 36.5 g/dL    RDW 14.5 10.0 - 15.0 %    Platelet Count 386 150 - 450 10e9/L    % Neutrophils 67.3 %    % Lymphocytes 19.3 %    % Monocytes 8.8 %    % Eosinophils 3.8 %    % Basophils 0.8 %    Absolute Neutrophil 7.5 1.6 - 8.3 10e9/L    Absolute Lymphocytes 2.2 0.8 - 5.3 10e9/L    Absolute Monocytes 1.0 0.0 - 1.3 10e9/L    Absolute Eosinophils 0.4 0.0 - 0.7 10e9/L    Absolute Basophils 0.1 0.0 - 0.2 10e9/L    Diff Method Automated Method        ASSESSMENT/PLAN:             1. Acute maxillary sinusitis, recurrence not specified  Not improving  Chest xray done to rule out pneumonia.  Will change Augmentin to doxycyline.  Follow up if no improvement in one week - consider referral to ENT.   - doxycycline monohydrate 100 MG capsule; Take 1 capsule (100 mg) by mouth 2 times daily  Dispense: 20 capsule; Refill: 0    2. Syncope, unspecified syncope type  Likely vasovagal, but will have him check with cardiology if he should be seen.  Go to the ED if he has a recurrence or  development of new symptoms.   - CBC with platelets differential  - Basic metabolic panel  - XR Chest 2 Views; Future  - EKG 12-lead complete w/read - Clinics        Treva Saleem NP  [facility]

## 2018-10-25 NOTE — MR AVS SNAPSHOT
After Visit Summary   10/25/2018    Braeden Umana    MRN: [id number]           Patient Information     Date Of Birth          1950        Visit Information        Provider Department      10/25/2018 2:30 PM Treva Saleem NP [facility]        Today's Diagnoses     Acute maxillary sinusitis, recurrence not specified    -  1    Syncope, unspecified syncope type           Follow-ups after your visit        Follow-up notes from your care team     Return in about 1 week (around 11/1/2018), or if symptoms worsen or fail to improve.      Who to contact     If you have questions or need follow up information about today's clinic visit or your schedule please contact [facility] directly at [phone].  Normal or non-critical lab and imaging results will be communicated to you by Xactiumhart, letter or phone within 4 business days after the clinic has received the results. If you do not hear from us within 7 days, please contact the clinic through Xactiumhart or phone. If you have a critical or abnormal lab result, we will notify you by phone as soon as possible.  Submit refill requests through [employer] Mapping (SAM) or call your pharmacy and they will forward the refill request to us. Please allow 3 business days for your refill to be completed.          Additional Information About Your Visit        MyChart Information     [employer] (SAM) gives you secure access to your electronic health record. If you see a primary care provider, you can also send messages to your care team and make appointments. If you have questions, please call your primary care clinic.  If you do not have a primary care provider, please call [phone] and they will assist you.        Care EveryWhere ID     This is your Care EveryWhere ID. This could be used by other organizations to access your [place] medical records  [id number]        Your Vitals Were     Pulse Temperature Respirations Pulse Oximetry BMI  (Body Mass Index)       103 97.8  F (36.6  C) (Oral) 18 97% 34.3 kg/m2        Blood Pressure from Last 3 Encounters:   10/25/18 157/89   10/12/18 138/76   08/15/18 144/83    Weight from Last 3 Encounters:   10/25/18 219 lb (99.3 kg)   10/12/18 220 lb (99.8 kg)   08/15/18 220 lb (99.8 kg)              We Performed the Following     Basic metabolic panel     CBC with platelets differential     EKG 12-lead complete w/read - Clinics          Today's Medication Changes          These changes are accurate as of 10/25/18 11:59 PM.  If you have any questions, ask your nurse or doctor.               Start taking these medicines.        Dose/Directions    doxycycline monohydrate 100 MG capsule   Used for:  Acute maxillary sinusitis, recurrence not specified   Started by:  Treva Saleem NP        Dose:  100 mg   Take 1 capsule (100 mg) by mouth 2 times daily   Quantity:  20 capsule   Refills:  0         These medicines have changed or have updated prescriptions.        Dose/Directions    losartan 50 MG tablet   Commonly known as:  COZAAR   This may have changed:  how much to take   Used for:  Hypertension goal BP (blood pressure) < 140/90        Dose:  50 mg   Take 1 tablet (50 mg) by mouth daily   Quantity:  90 tablet   Refills:  PRN         Stop taking these medicines if you haven't already. Please contact your care team if you have questions.     amoxicillin-clavulanate 875-125 MG per tablet   Commonly known as:  AUGMENTIN   Stopped by:  Treva Saleem NP                Where to get your medicines      These medications were sent to [facility] [address] [place], [place] [address] - [address] [facility]  [address] [facility] [address]     Phone:  [phone]     doxycycline monohydrate 100 MG capsule                Primary Care Provider Office Phone # Fax #    Treva Saleem [phone]       [address] [facility] [address]        Equal Access to Services     STEVIE BUSTAMANTE AH: Malvin butler  rica Gan, wakareemda luqadaha, qaybta kaalmada yudelka, didi dejesustra elina. So [facility] [phone].    ATENCIÓN: Si habla kaylee, tiene a guajardo disposición servicios gratuitos de asistencia lingüística. Hbeert al [phone].    We comply with applicable federal civil rights laws and [place] laws. We do not discriminate on the basis of race, color, national origin, age, disability, sex, sexual orientation, or gender identity.            Thank you!     Thank you for choosing [facility]  for your care. Our goal is always to provide you with excellent care. Hearing back from our patients is one way we can continue to improve our services. Please take a few minutes to complete the written survey that you may receive in the mail after your visit with us. Thank you!             Your Updated Medication List - Protect others around you: Learn how to safely use, store and throw away your medicines at www.disposemymeds.org.          This list is accurate as of 10/25/18 11:59 PM.  Always use your most recent med list.                   Brand Name Dispense Instructions for use Diagnosis    ALPRAZolam 0.25 MG tablet    XANAX     Take 0.25 mg by mouth 3 times daily as needed.        amLODIPine 10 MG tablet    NORVASC    90 tablet    Take 1 tablet (10 mg) by mouth daily    Hypertension goal BP (blood pressure) < 140/90       aspirin 81 MG EC tablet     90 tablet    Take 1 tablet (81 mg) by mouth daily        BENADRYL 25 MG tablet   Generic drug:  diphenhydrAMINE      Take 25 mg by mouth every 6 hours as needed.        clopidogrel 75 MG tablet    PLAVIX    90 tablet    ONE DAILY    Atherosclerosis of coronary artery of native heart, angina presence unspecified, unspecified vessel or lesion type       CYMBALTA 60 MG EC capsule   Generic drug:  DULoxetine     0    1 CAPSULE ONCE DAILY        diclofenac 1 % Gel topical gel    VOLTAREN    100 g    Apply 2 grams to left ankle four times  daily as needed using enclosed dosing card.    Posterior tibial tendon dysfunction       doxycycline monohydrate 100 MG capsule     20 capsule    Take 1 capsule (100 mg) by mouth 2 times daily    Acute maxillary sinusitis, recurrence not specified       evolocumab 140 MG/ML prefilled autoinjector    REPATHA          ezetimibe 10 MG tablet    ZETIA    90 tablet    Take 1 tablet (10 mg) by mouth daily    Hyperlipidemia LDL goal <70       losartan 50 MG tablet    COZAAR    90 tablet    Take 1 tablet (50 mg) by mouth daily    Hypertension goal BP (blood pressure) < 140/90       NEURONTIN 300 MG capsule   Generic drug:  gabapentin     90 capsule    Take 300 mg by mouth daily Take 3 tablets daily        nitroGLYcerin 0.4 MG sublingual tablet    NITROSTAT    1 tablet    Place 0.4 mg under the tongue every 5 minutes as needed for chest pain Reported on 5/22/2017        order for DME     1 Device    Equipment being ordered: CPAP REPLACEMENT MASK FAX TO [facility] [phone]    Sleep apnea       QUEtiapine 25 MG tablet    SEROquel     Take 25 mg by mouth Take 1 to 2 tablets by mouth at bedtime    Diplopia, Memory loss, Fatigue, unspecified type       ranitidine 150 MG tablet    ZANTAC    60 tablet    Take 1 tablet (150 mg) by mouth 2 times daily    Gastroesophageal reflux disease, esophagitis presence not specified       STATIN NOT PRESCRIBED (INTENTIONAL)      Please choose reason not prescribed, below    Atherosclerosis of coronary artery of native heart, angina presence unspecified, unspecified vessel or lesion type

## 2018-10-26 LAB
ANION GAP SERPL CALCULATED.3IONS-SCNC: 7 MMOL/L (ref 3–14)
BUN SERPL-MCNC: 17 MG/DL (ref 7–30)
CALCIUM SERPL-MCNC: 9.2 MG/DL (ref 8.5–10.1)
CHLORIDE SERPL-SCNC: 105 MMOL/L (ref 94–109)
CO2 SERPL-SCNC: 27 MMOL/L (ref 20–32)
CREAT SERPL-MCNC: 1.18 MG/DL (ref 0.66–1.25)
GFR SERPL CREATININE-BSD FRML MDRD: 61 ML/MIN/1.7M2
GLUCOSE SERPL-MCNC: 89 MG/DL (ref 70–99)
POTASSIUM SERPL-SCNC: 4.9 MMOL/L (ref 3.4–5.3)
SODIUM SERPL-SCNC: 139 MMOL/L (ref 133–144)

## 2018-11-20 LAB
CHOLEST SERPL-MCNC: 125 MG/DL (ref 100–199)
HDLC SERPL-MCNC: 26 MG/DL
LDLC SERPL CALC-MCNC: 73 MG/DL
NONHDLC SERPL-MCNC: 99 MG/DL
TRIGL SERPL-MCNC: 129 MG/DL

## 2018-11-27 ENCOUNTER — TRANSFERRED RECORDS (OUTPATIENT)
Dept: HEALTH INFORMATION MANAGEMENT | Facility: CLINIC | Age: 68
End: 2018-11-27

## 2018-11-29 LAB — EJECTION FRACTION: 67

## 2018-12-04 ENCOUNTER — OFFICE VISIT (OUTPATIENT)
Dept: FAMILY MEDICINE | Facility: CLINIC | Age: 68
End: 2018-12-04
Payer: COMMERCIAL

## 2018-12-04 VITALS
HEART RATE: 98 BPM | OXYGEN SATURATION: 95 % | WEIGHT: 216 LBS | TEMPERATURE: 97.4 F | RESPIRATION RATE: 18 BRPM | DIASTOLIC BLOOD PRESSURE: 91 MMHG | BODY MASS INDEX: 33.83 KG/M2 | SYSTOLIC BLOOD PRESSURE: 161 MMHG

## 2018-12-04 DIAGNOSIS — L03.115 CELLULITIS OF RIGHT LOWER EXTREMITY: ICD-10-CM

## 2018-12-04 DIAGNOSIS — J01.01 ACUTE RECURRENT MAXILLARY SINUSITIS: Primary | ICD-10-CM

## 2018-12-04 DIAGNOSIS — R55 SYNCOPE, UNSPECIFIED SYNCOPE TYPE: ICD-10-CM

## 2018-12-04 DIAGNOSIS — I10 BENIGN ESSENTIAL HYPERTENSION: ICD-10-CM

## 2018-12-04 DIAGNOSIS — Z23 NEED FOR PROPHYLACTIC VACCINATION AND INOCULATION AGAINST INFLUENZA: ICD-10-CM

## 2018-12-04 PROCEDURE — 99214 OFFICE O/P EST MOD 30 MIN: CPT | Mod: 25 | Performed by: FAMILY MEDICINE

## 2018-12-04 PROCEDURE — 90662 IIV NO PRSV INCREASED AG IM: CPT | Performed by: FAMILY MEDICINE

## 2018-12-04 PROCEDURE — G0008 ADMIN INFLUENZA VIRUS VAC: HCPCS | Performed by: FAMILY MEDICINE

## 2018-12-04 RX ORDER — DOXYCYCLINE 100 MG/1
100 CAPSULE ORAL 2 TIMES DAILY
Qty: 42 CAPSULE | Refills: 0 | Status: SHIPPED | OUTPATIENT
Start: 2018-12-04 | End: 2018-12-17

## 2018-12-04 RX ORDER — CHLORTHALIDONE 25 MG/1
25 TABLET ORAL
COMMUNITY
Start: 2016-05-03 | End: 2018-12-27

## 2018-12-04 NOTE — PROGRESS NOTES
SUBJECTIVE:   Braeden Umana is a 68 year old male who presents to clinic today for the following health issues:    RESPIRATORY SYMPTOMS      Duration: x8-9 days     Description  Fatigue, cough, sore throat, post nasal  drip     Severity: mild    Accompanying signs and symptoms: None    Therapies tried and outcome:  Robitussin outcome effective      Additional: pt went 3 rounds of antibiotic for sinus infection and was recommenced to see an ENT       1.  Right lower leg problem: he had an itchy area on his right shin, which he scratched; then it flared up red and tender yesterday.  No fevers, chills, nausea or exudate.  He has applied a bandaid over the area he scratched. No alleviating or exacerbating factors.     2.  Sinusitis:  The patient reports he developed a sinus infection that was recurrent in late October and took three rounds of antibiotic after visiting his PCP.  He was advised that if his symptoms recurred, he should see an ENT.  He has once again has right sinus pressure and drainage, starting 9 days ago.  He feels fatigued.  No fevers.  No cough, ear pain or sore throat.  Usually, he has pressure between his eyes and over the forehead with sinusitis.  He notes he had sinus surgery 20 yrs ago.  Of the antibiotics he took, doxycycline seemed to work the best.  He has also tried neti pot.    3.  Syncope:   Around the same time that the sinus infections started in late October, he had an episode of syncope.  He had gotten up off the couch and had started walking then passed out.  He was advised to f/u with his cardiologist, which he did.  He has had several more episodes of pre-syncope, but no further syncope.  This always occurs with standing up.  He says that his cardiology office checked orthostatics and this was not the problem.  He has been having very elevated BPs, and his cardiologist has been increasing his medications.  He states that today's value is an improvement.  He is currently wearing  an event monitor and had an echo done.            Problem list and histories reviewed & adjusted, as indicated.  Additional history: as documented    Patient Active Problem List   Diagnosis     Coronary atherosclerosis     Hypertrophy (benign) of prostate     Idiopathic urticaria     ANXIETY      Esophageal reflux     Idiopathic angioedema     Mild recurrent major depression (H)     Hyperlipidemia LDL goal <70     Carotid artery stenosis     Advanced directives, counseling/discussion     Hypertension goal BP (blood pressure) < 140/90     Achilles bursitis or tendinitis     Sleep apnea     BMI >35     Cervical pain     Shoulder pain, left     Past Surgical History:   Procedure Laterality Date     SURGICAL HISTORY OF -       sinus surgery     SURGICAL HISTORY OF -       angiogram and stent placement     SURGICAL HISTORY OF -       orchioplexy     SURGICAL HISTORY OF -   12/31/12    angiogram and 4 stents     SURGICAL HISTORY OF -   2/26/13    angiogram and stent     SURGICAL HISTORY OF -   1/15    angiogram and stents     SURGICAL HISTORY OF -   5/15    angiogram and stent       Social History   Substance Use Topics     Smoking status: Former Smoker     Smokeless tobacco: Never Used      Comment: x30 years+     Alcohol use No     Family History   Problem Relation Age of Onset     C.A.D. Mother      bypass     Hypertension Mother      C.A.D. Father      late 50's     Hypertension Father      Neurologic Disorder Father      parkinsons     C.A.D. Brother      x2/CAD angioplasty age 54     Allergies Brother      Diabetes No family hx of      Cerebrovascular Disease No family hx of      Cancer - colorectal No family hx of      Prostate Cancer No family hx of          Current Outpatient Prescriptions   Medication Sig Dispense Refill     ALPRAZolam (XANAX) 0.25 MG tablet Take 0.25 mg by mouth 3 times daily as needed.       amLODIPine (NORVASC) 10 MG tablet Take 1 tablet (10 mg) by mouth daily 90 tablet 3     aspirin 81 MG EC  tablet Take 1 tablet (81 mg) by mouth daily 90 tablet 3     chlorthalidone (HYGROTON) 25 MG tablet Take 25 mg by mouth       clopidogrel (PLAVIX) 75 MG tablet ONE DAILY 90 tablet PRN     CYMBALTA 60 MG OR CPEP 1 CAPSULE ONCE DAILY 0 0     diclofenac (VOLTAREN) 1 % GEL topical gel Apply 2 grams to left ankle four times daily as needed using enclosed dosing card. 100 g 0     diphenhydrAMINE (BENADRYL) 25 MG tablet Take 25 mg by mouth every 6 hours as needed.       doxycycline monohydrate (MONODOX) 100 MG capsule Take 1 capsule (100 mg) by mouth 2 times daily for 21 days 42 capsule 0     evolocumab (REPATHA) 140 MG/ML prefilled autoinjector        ezetimibe (ZETIA) 10 MG tablet Take 1 tablet (10 mg) by mouth daily 90 tablet PRN     gabapentin (NEURONTIN) 300 MG capsule Take 300 mg by mouth daily Take 3 tablets daily 90 capsule      losartan (COZAAR) 50 MG tablet Take 1 tablet (50 mg) by mouth daily (Patient taking differently: Take 100 mg by mouth daily ) 90 tablet PRN     nitroglycerin (NITROSTAT) 0.4 MG SL tablet Place 0.4 mg under the tongue every 5 minutes as needed for chest pain Reported on 5/22/2017 1 tablet 0     order for DME Equipment being ordered: CPAP REPLACEMENT MASK  FAX TO [facility] [phone] 1 Device 0     QUEtiapine (SEROQUEL) 25 MG tablet Take 25 mg by mouth Take 1 to 2 tablets by mouth at bedtime       ranitidine (ZANTAC) 150 MG tablet Take 1 tablet (150 mg) by mouth 2 times daily 60 tablet PRN     STATIN NOT PRESCRIBED, INTENTIONAL, Please choose reason not prescribed, below       Allergies   Allergen Reactions     Contrast Dye Other (See Comments)     IV contrast dye-total cardiac arrest     Apple Hives     angioedema     Cashews [Nuts] Hives     Angio edema     Citric Acid      Codeine      sensitive     Coreg [Carvedilol] Difficulty breathing     Keflex [Cephalexin Monohydrate] Swelling     Angioedema - possibly due to Keflex     [place] Oil Hives     angioedema     Shrimp Hives      angioedema     Simvastatin      Zocor        Reviewed and updated as needed this visit by clinical staff  Tobacco  Allergies  Meds  Med Hx  Surg Hx  Fam Hx  Soc Hx      Reviewed and updated as needed this visit by Provider         ROS:  Constitutional, HEENT, cardiovascular, pulmonary, gi and gu systems are negative, except as otherwise noted.    OBJECTIVE:     BP (!) 161/91 (BP Location: Left arm, Patient Position: Sitting, Cuff Size: Adult Large)  Pulse 98  Temp 97.4  F (36.3  C) (Oral)  Resp 18  Wt 216 lb (98 kg)  SpO2 95%  BMI 33.83 kg/m2  Body mass index is 33.83 kg/(m^2).  GENERAL APPEARANCE: healthy, alert and no distress  EYES: Eyes grossly normal to inspection, PERRL and conjunctivae and sclerae normal  HENT: ear canals and TM's normal and nose and mouth without ulcers or lesions.  There is purulent discharge and a nasal polyp to the right naris.  There is right maxillary pain to percussion.    NECK: mild shotty LAD in the anterior cervical chains; no asymmetry, masses, or scars and thyroid normal to palpation  RESP: lungs clear to auscultation - no rales, rhonchi or wheezes  CV: regular rates and rhythm, normal S1 S2, no S3 or S4 and no murmur, click or rub  SKIN: there is a 4cm area of erythema, warmth and tenderness to his right anterior shin; there is a central area of excoriation.  No exudate.  No fluctuance, induration; minimal swelling if any.  I drew an outline around the infection.   PSYCH: mentation appears normal and affect normal/bright        ASSESSMENT/PLAN:             1. Acute recurrent maxillary sinusitis  rx done for doxycycline for 3 weeks given recurrent nature.  Referral to ENT also done, patient with h/o sinus surgery 20 yrs ago and now having recurrent infections.  Advised yogurt/probiotic, advised to monitor for severe diarrhea.    2. Need for prophylactic vaccination and inoculation against influenza    - FLU VACCINE, INCREASED ANTIGEN, PRESV FREE, AGE 65+ [35783]  -  Vaccine Administration, Initial [80226]    3. Benign essential hypertension  Uncontrolled but improved per the patient.  He continues to work with cardiology on this, so I did not change his regimen.      4. Syncope, unspecified syncope type  F/u with cardiology on testing.  Reviewed that his Echo showed some mild aortic valve sclerosis.  He has a history of carotid stenosis s/p endarterectomy on the left, palpitationns, HTN, JONNATHAN, coronary artery disease s/p stents, so a cardiovascular etiology or bp med side effect does seem most likely.     5.  Cellulitis of the RLE; also chose doxycycline to cover cellulitis.  Advised to elevate the leg and monitor for any spreading redness, worsening pain or swelling or new fever.      Donna Hope MD  [facility]

## 2018-12-04 NOTE — PROGRESS NOTES

## 2018-12-04 NOTE — MR AVS SNAPSHOT
After Visit Summary   12/4/2018    Braeden Umana    MRN: [id number]           Patient Information     Date Of Birth          1950        Visit Information        Provider Department      12/4/2018 10:20 AM Donna Hope MD [facility]        Today's Diagnoses     Acute maxillary sinusitis, recurrence not specified           Follow-ups after your visit        Additional Services     OTOLARYNGOLOGY REFERRAL       Your provider has referred you to: [facility]: [facility] (Otolaryngology) - [place] [phone]  [email or web address]  N: [facility] [phone]   [email or web address]  N: [facility] [place] [phone]   [email or web address]    Please be aware that coverage of these services is subject to the terms and limitations of your health insurance plan.  Call member services at your health plan with any benefit or coverage questions.      Please bring the following with you to your appointment:    (1) Any X-Rays, CTs or MRIs which have been performed.  Contact the facility where they were done to arrange for  prior to your scheduled appointment.   (2) List of current medications  (3) This referral request   (4) Any documents/labs given to you for this referral                  Who to contact     If you have questions or need follow up information about today's clinic visit or your schedule please contact [facility] directly at [phone].  Normal or non-critical lab and imaging results will be communicated to you by MyChart, letter or phone within 4 business days after the clinic has received the results. If you do not hear from us within 7 days, please contact the clinic through MyChart or phone. If you have a critical or abnormal lab result, we will  notify you by phone as soon as possible.  Submit refill requests through [employer] or call your pharmacy and they will forward the refill request to us. Please allow 3 business days for your refill to be completed.          Additional Information About Your Visit        [email or web address] Information     [employer] gives you secure access to your electronic health record. If you see a primary care provider, you can also send messages to your care team and make appointments. If you have questions, please call your primary care clinic.  If you do not have a primary care provider, please call [phone] and they will assist you.        Care EveryWhere ID     This is your Care EveryWhere ID. This could be used by other organizations to access your [facility] medical records  [id number]        Your Vitals Were     Pulse Temperature Respirations Pulse Oximetry BMI (Body Mass Index)       98 97.4  F (36.3  C) (Oral) 18 95% 33.83 kg/m2        Blood Pressure from Last 3 Encounters:   12/04/18 (!) 161/91   10/25/18 157/89   10/12/18 138/76    Weight from Last 3 Encounters:   12/04/18 216 lb (98 kg)   10/25/18 219 lb (99.3 kg)   10/12/18 220 lb (99.8 kg)              We Performed the Following     OTOLARYNGOLOGY REFERRAL          Today's Medication Changes          These changes are accurate as of 12/4/18 10:54 AM.  If you have any questions, ask your nurse or doctor.               These medicines have changed or have updated prescriptions.        Dose/Directions    losartan 50 MG tablet   Commonly known as:  COZAAR   This may have changed:  how much to take   Used for:  Hypertension goal BP (blood pressure) < 140/90        Dose:  50 mg   Take 1 tablet (50 mg) by mouth daily   Quantity:  90 tablet   Refills:  PRN            Where to get your medicines      These medications were sent to [id number]  [place] - [address]  [address] [facility] [address] [facility] [address]     Phone:  [phone]     doxycycline monohydrate 100  MG capsule                Primary Care Provider Office Phone # Fax #    Treva SaleemLINDA [phone] [address] [place]  [facility] [address]        Equal Access to Services     STEVIE BUSTAMANTE : Hadii aad ku hadtereo Soomaali, waaxda luqadaha, qaybta kaalmada adeegyada, didi arauzn ambrose zamora elvia antoine. So [facility] [phone].    ATENCIÓN: Si habla español, tiene a guajardo disposición servicios gratuitos de asistencia lingüística. Llame al [phone].    We comply with applicable federal civil rights laws and [place] laws. We do not discriminate on the basis of race, color, national origin, age, disability, sex, sexual orientation, or gender identity.            Thank you!     Thank you for choosing [facility]  for your care. Our goal is always to provide you with excellent care. Hearing back from our patients is one way we can continue to improve our services. Please take a few minutes to complete the written survey that you may receive in the mail after your visit with us. Thank you!             Your Updated Medication List - Protect others around you: Learn how to safely use, store and throw away your medicines at www.disposemymeds.org.          This list is accurate as of 12/4/18 10:54 AM.  Always use your most recent med list.                   Brand Name Dispense Instructions for use Diagnosis    ALPRAZolam 0.25 MG tablet    XANAX     Take 0.25 mg by mouth 3 times daily as needed.        amLODIPine 10 MG tablet    NORVASC    90 tablet    Take 1 tablet (10 mg) by mouth daily    Hypertension goal BP (blood pressure) < 140/90       aspirin 81 MG EC tablet    ASA    90 tablet    Take 1 tablet (81 mg) by mouth daily        BENADRYL 25 MG tablet   Generic drug:  diphenhydrAMINE      Take 25 mg by mouth every 6 hours as needed.        chlorthalidone 25 MG tablet    HYGROTON     Take 25 mg by mouth        clopidogrel 75 MG tablet    PLAVIX    90 tablet    ONE DAILY     Atherosclerosis of coronary artery of native heart, angina presence unspecified, unspecified vessel or lesion type       CYMBALTA 60 MG capsule   Generic drug:  DULoxetine     0    1 CAPSULE ONCE DAILY        diclofenac 1 % topical gel    VOLTAREN    100 g    Apply 2 grams to left ankle four times daily as needed using enclosed dosing card.    Posterior tibial tendon dysfunction       doxycycline monohydrate 100 MG capsule    MONODOX    42 capsule    Take 1 capsule (100 mg) by mouth 2 times daily for 21 days    Acute maxillary sinusitis, recurrence not specified       evolocumab 140 MG/ML prefilled autoinjector    REPATHA          ezetimibe 10 MG tablet    ZETIA    90 tablet    Take 1 tablet (10 mg) by mouth daily    Hyperlipidemia LDL goal <70       losartan 50 MG tablet    COZAAR    90 tablet    Take 1 tablet (50 mg) by mouth daily    Hypertension goal BP (blood pressure) < 140/90       NEURONTIN 300 MG capsule   Generic drug:  gabapentin     90 capsule    Take 300 mg by mouth daily Take 3 tablets daily        nitroGLYcerin 0.4 MG sublingual tablet    NITROSTAT    1 tablet    Place 0.4 mg under the tongue every 5 minutes as needed for chest pain Reported on 5/22/2017        order for DME     1 Device    Equipment being ordered: CPAP REPLACEMENT MASK FAX TO [facility] [phone]    Sleep apnea       QUEtiapine 25 MG tablet    SEROquel     Take 25 mg by mouth Take 1 to 2 tablets by mouth at bedtime    Diplopia, Memory loss, Fatigue, unspecified type       ranitidine 150 MG tablet    ZANTAC    60 tablet    Take 1 tablet (150 mg) by mouth 2 times daily    Gastroesophageal reflux disease, esophagitis presence not specified       STATIN NOT PRESCRIBED    INTENTIONAL     Please choose reason not prescribed, below    Atherosclerosis of coronary artery of native heart, angina presence unspecified, unspecified vessel or lesion type

## 2018-12-10 ENCOUNTER — MYC MEDICAL ADVICE (OUTPATIENT)
Dept: FAMILY MEDICINE | Facility: CLINIC | Age: 68
End: 2018-12-10

## 2018-12-10 NOTE — TELEPHONE ENCOUNTER
Dr. Hope     Patient given doxycycline for 3 weeks on 12/4/18 to treat sinusitis and cellulitis     Patient was advised to stop doxy by ENT as no evidence of sinus infection     Patient continued to take for cellulitis - would you like him to complete the 3 week course or shorten?     Please advise     Gianna Henson Registered Nurse   [facility]

## 2018-12-10 NOTE — TELEPHONE ENCOUNTER
He should complete at least a 7 day course; if it is not totally resolved, would extend to 10 days.  Thanks.

## 2018-12-10 NOTE — TELEPHONE ENCOUNTER
Phone call to patient     Discussed with patient that he should complete 7 days of doxy to treat the cellulitis - if not resolved completely after 7 days to extend for 3 more days     Patient is advised that if symptoms worsen in any way to return call to clinic for recommendations - patient agrees with this plan     Gianna Henson Registered Nurse   [facility] and [facility]

## 2018-12-17 ENCOUNTER — OFFICE VISIT (OUTPATIENT)
Dept: FAMILY MEDICINE | Facility: CLINIC | Age: 68
End: 2018-12-17
Payer: COMMERCIAL

## 2018-12-17 ENCOUNTER — TELEPHONE (OUTPATIENT)
Dept: FAMILY MEDICINE | Facility: CLINIC | Age: 68
End: 2018-12-17

## 2018-12-17 VITALS
WEIGHT: 219 LBS | RESPIRATION RATE: 18 BRPM | BODY MASS INDEX: 34.3 KG/M2 | HEART RATE: 91 BPM | DIASTOLIC BLOOD PRESSURE: 66 MMHG | SYSTOLIC BLOOD PRESSURE: 104 MMHG | TEMPERATURE: 97 F | OXYGEN SATURATION: 97 %

## 2018-12-17 DIAGNOSIS — K21.9 GASTROESOPHAGEAL REFLUX DISEASE, ESOPHAGITIS PRESENCE NOT SPECIFIED: ICD-10-CM

## 2018-12-17 DIAGNOSIS — I25.10 ATHEROSCLEROSIS OF CORONARY ARTERY OF NATIVE HEART, ANGINA PRESENCE UNSPECIFIED, UNSPECIFIED VESSEL OR LESION TYPE: ICD-10-CM

## 2018-12-17 DIAGNOSIS — I10 HYPERTENSION GOAL BP (BLOOD PRESSURE) < 140/90: ICD-10-CM

## 2018-12-17 DIAGNOSIS — R79.89 ELEVATED SERUM CREATININE: ICD-10-CM

## 2018-12-17 DIAGNOSIS — R55 NEAR SYNCOPE: Primary | ICD-10-CM

## 2018-12-17 LAB
ANION GAP SERPL CALCULATED.3IONS-SCNC: 6 MMOL/L (ref 3–14)
BUN SERPL-MCNC: 33 MG/DL (ref 7–30)
CALCIUM SERPL-MCNC: 9.7 MG/DL (ref 8.5–10.1)
CHLORIDE SERPL-SCNC: 98 MMOL/L (ref 94–109)
CO2 SERPL-SCNC: 29 MMOL/L (ref 20–32)
CREAT SERPL-MCNC: 1.52 MG/DL (ref 0.66–1.25)
ERYTHROCYTE [DISTWIDTH] IN BLOOD BY AUTOMATED COUNT: 14.5 % (ref 10–15)
GFR SERPL CREATININE-BSD FRML MDRD: 45 ML/MIN/1.7M2
GLUCOSE SERPL-MCNC: 116 MG/DL (ref 70–99)
HCT VFR BLD AUTO: 45.3 % (ref 40–53)
HGB BLD-MCNC: 15 G/DL (ref 13.3–17.7)
MCH RBC QN AUTO: 29.6 PG (ref 26.5–33)
MCHC RBC AUTO-ENTMCNC: 33.1 G/DL (ref 31.5–36.5)
MCV RBC AUTO: 90 FL (ref 78–100)
PLATELET # BLD AUTO: 387 10E9/L (ref 150–450)
POTASSIUM SERPL-SCNC: 4.8 MMOL/L (ref 3.4–5.3)
RBC # BLD AUTO: 5.06 10E12/L (ref 4.4–5.9)
SODIUM SERPL-SCNC: 133 MMOL/L (ref 133–144)
WBC # BLD AUTO: 10.2 10E9/L (ref 4–11)

## 2018-12-17 PROCEDURE — 99215 OFFICE O/P EST HI 40 MIN: CPT | Performed by: NURSE PRACTITIONER

## 2018-12-17 PROCEDURE — 93000 ELECTROCARDIOGRAM COMPLETE: CPT | Performed by: NURSE PRACTITIONER

## 2018-12-17 PROCEDURE — 36415 COLL VENOUS BLD VENIPUNCTURE: CPT | Performed by: NURSE PRACTITIONER

## 2018-12-17 PROCEDURE — 85027 COMPLETE CBC AUTOMATED: CPT | Performed by: NURSE PRACTITIONER

## 2018-12-17 PROCEDURE — 80048 BASIC METABOLIC PNL TOTAL CA: CPT | Performed by: NURSE PRACTITIONER

## 2018-12-17 ASSESSMENT — PATIENT HEALTH QUESTIONNAIRE - PHQ9: SUM OF ALL RESPONSES TO PHQ QUESTIONS 1-9: 7

## 2018-12-17 NOTE — TELEPHONE ENCOUNTER
Phone call from Ally RN with [facility]     Ally RN is returning call back from Treva Canales RN states that Treva Figueroa NP would be better talking provider to provider rather than triage RN and provides options below     1. Cardiology betty [address] [phone]    2. If prefers to speak to Bhavana Mack CNP directly return call back to triage at [phone] and they will page her directly     Gianna Henson Registered Nurse   [facility]

## 2018-12-17 NOTE — PROGRESS NOTES
SUBJECTIVE:   Braeden Umana is a 68 year old male who presents to clinic today for the following health issues:      Dizziness      Duration:   Pt states he has been feeling lightheaded a lot. When he stands up after sitting down he starts feeling faint and needs to sit back down. He notes this has been going on since late October and getting worst over the past week.     Description   Feeling faint:  YES  Feeling like the surroundings are moving: No,   Loss of consciousness or falls: Only the first time    He has seen 2 cardiologist. Heart monitor and Echo test     Intensity:  severe    Accompanying signs and symptoms:   Nausea/vomig: YES- a little nausea right now  Palpitations: no   Weakness in arms or legs: no   Vision or speech changes: vision spots  Ringing in ears (Tinnitus): no   Hearing loss related to dizziness: no   Other (fevers/chills/sweating/dyspnea): Constipation and Exhaustion: He needs 2 naps a day sometimes 4 hours per nap.   He is only able to do 3-5 minute walks a couple of times a day, but then is extremely exhausted.  He is awaiting the event monitor results which was just returned.  The two weeks he wore it, he pressed the button 4-5 times, now is having symptoms several times a day.  Also a lot of acid reflux-getting worse over the past week. Last night he was up most of the night.    He has started to monitor his BP. He is taking a 1/2 tablet of chlorthalidone   Blood pressures have been coming down       History (similar episodes/head trauma/previous evaluation/recent bleeding): None    Precipitating or alleviating factors (new meds/chemicals): Yes. Repatha is new      Therapies tried and outcome: He has been trying to take short walks. Uses a cane    He has needed to take Mylanta every night for the past 2 weeks.  He is having acid reflux and some nausea.   He has not been taking Zantac.         Problem list and histories reviewed & adjusted, as indicated.  Additional history: as  documented        Reviewed and updated as needed this visit by clinical staff  Allergies  Meds       Reviewed and updated as needed this visit by Provider         ROS:  CONSTITUTIONAL: NEGATIVE for fever, chills, change in weight; positive for fatigue  INTEGUMENTARY/SKIN: NEGATIVE for worrisome rashes, moles or lesions  EYES: NEGATIVE for vision changes or irritation  ENT/MOUTH: NEGATIVE for ear, mouth and throat problems  RESP: NEGATIVE for significant cough or SOB  CV: NEGATIVE for chest pain, palpitations or peripheral edema  GI: NEGATIVE for nausea, abdominal pain, heartburn, or change in bowel habits  MUSCULOSKELETAL: NEGATIVE for significant arthralgias or myalgia  NEURO: NEGATIVE for weakness and dizziness; negative for paresthesias  ENDOCRINE: NEGATIVE for temperature intolerance, skin/hair changes  HEME/ALLERGY/IMMUNE: NEGATIVE for bleeding problems  PSYCHIATRIC: NEGATIVE for changes in mood or affect    OBJECTIVE:     /66 (BP Location: Right arm, Patient Position: Standing, Cuff Size: Adult Large)   Pulse 91   Temp 97  F (36.1  C) (Oral)   Resp 18   Wt 99.3 kg (219 lb)   SpO2 97%   BMI 34.30 kg/m    Body mass index is 34.3 kg/m .  GENERAL: healthy, alert and no distress  NECK: no adenopathy, no asymmetry, masses, or scars and thyroid normal to palpation  RESP: lungs clear to auscultation - no rales, rhonchi or wheezes  CV: regular rate and rhythm, normal S1 S2, no S3 or S4, no murmur, click or rub, no peripheral edema and peripheral pulses strong  ABDOMEN: soft, nontender, no hepatosplenomegaly, no masses and bowel sounds normal  MS: no gross musculoskeletal defects noted, no edema  NEURO: Normal strength and tone, mentation intact and speech normal  PSYCH: mentation appears normal, affect normal/bright        ASSESSMENT/PLAN:         I spent 45 minutes with patient, face-to-face, discussing the following and coordinating care regardin. Near syncope  Symptoms are increasing in  frequency, accompanied by extreme fatigue and exercise intolerance.  Highest concern would be for an underlying arrhythmia.  EKG today is normal.   Orthostatics today are normal.  I did speak to Dr. Preston regarding patient's symptoms and he will have his nurse get him in sooner.  Zio patch results are still pending.  Will check labs to rule out anemia, electrolyte imbalance.  Will also have him see MTM to discuss medication interactions; possible side effect related to Repatha?  - CBC with platelets  - Basic metabolic panel  - EKG 12-lead complete w/read - Clinics    2. Gastroesophageal reflux disease, esophagitis presence not specified  Restart Zantac 150 mg BID.  If symptoms are not improving, will consider changing to a PPI.   - ranitidine (ZANTAC) 150 MG tablet; Take 1 tablet (150 mg) by mouth 2 times daily  Dispense: 60 tablet; Refill: PRN    3. Atherosclerosis of coronary artery of native heart, angina presence unspecified, unspecified vessel or lesion type  Complex cardiac history.  See above.     4. Hypertension goal BP (blood pressure) < 140/90  At goal. No significant orthostatic hypotension.           Treva Saleem NP  [facility] -

## 2018-12-17 NOTE — TELEPHONE ENCOUNTER
S-(situation): concerned with feeling faint     B-(background): started back in October - recently had cardiac testing done through [facility] - does not yet have the results back     A-(assessment): patient states that the feelings of faintness have continued and he feels that they are worsening since October   Using a cane now to steady himself   Patient is not having any chest pain or shortness of breath   No headache   Patient states that last night he had acid reflux and now has stomach ache - he used mylanta   No recent falls - in October patient did fall and hit his head     R-(recommendations): patient requesting office visit with pcp to discuss ongoing faintness and get recommendations     Office visit scheduled with pcp - writer asked patient to contact his heart clinic prior to appt. With pcp to see if cardiac testing is done and completed     Gianna Henson Registered Nurse   [facility]

## 2018-12-23 ENCOUNTER — TRANSFERRED RECORDS (OUTPATIENT)
Dept: HEALTH INFORMATION MANAGEMENT | Facility: CLINIC | Age: 68
End: 2018-12-23

## 2018-12-23 LAB — EJECTION FRACTION: 71

## 2018-12-27 ENCOUNTER — OFFICE VISIT (OUTPATIENT)
Dept: FAMILY MEDICINE | Facility: CLINIC | Age: 68
End: 2018-12-27
Payer: COMMERCIAL

## 2018-12-27 VITALS
TEMPERATURE: 98.4 F | OXYGEN SATURATION: 98 % | RESPIRATION RATE: 16 BRPM | DIASTOLIC BLOOD PRESSURE: 60 MMHG | WEIGHT: 221.13 LBS | BODY MASS INDEX: 34.63 KG/M2 | SYSTOLIC BLOOD PRESSURE: 110 MMHG | HEART RATE: 85 BPM

## 2018-12-27 DIAGNOSIS — I25.10 CORONARY ARTERY DISEASE INVOLVING NATIVE HEART WITHOUT ANGINA PECTORIS, UNSPECIFIED VESSEL OR LESION TYPE: ICD-10-CM

## 2018-12-27 DIAGNOSIS — R53.83 FATIGUE, UNSPECIFIED TYPE: Primary | ICD-10-CM

## 2018-12-27 PROCEDURE — 99495 TRANSJ CARE MGMT MOD F2F 14D: CPT | Performed by: FAMILY MEDICINE

## 2018-12-27 NOTE — PROGRESS NOTES
SUBJECTIVE:   Braeden Umana is a 68 year old male who presents to clinic today for the following health issues:      Hospital Follow-up Visit:    Hospital/Nursing Home/IP Rehab Facility: [facility]  Date of Admission: 12/23/18  Date of Discharge: 12/24/18  Reason(s) for Admission:   Unstable angina (HC) (Primary Dx);   Orthostasis;   Exertional dyspnea;   ASCVD (arteriosclerotic cardiovascular disease);   Cardiovascular symptoms            Problems taking medications regularly:  None       Medication changes since discharge: None       Problems adhering to non-medication therapy:  None    Summary of hospitalization:  CareEverywhere information obtained and reviewed  Diagnostic Tests/Treatments reviewed.  Follow up needed: none  Other Healthcare Providers Involved in Patient s Care:         Specialist appointment - Cardiologist  Update since discharge: stable.     Post Discharge Medication Reconciliation: discharge medications reconciled, continue medications without change.  Plan of care communicated with patient     Coding guidelines for this visit:  Type of Medical   Decision Making Face-to-Face Visit       within 7 Days of discharge Face-to-Face Visit        within 14 days of discharge   Moderate Complexity [id number]   High Complexity [id number]              -------------------------------------    Problem list and histories reviewed & adjusted, as indicated.  Additional history:    He was hospitalized for sudden onset of jaw pain and chest pressure and fatigue, dyspnea and syncope. He had a thorough cardiac workup including and event montitor, echo, Carotid US, and angiogram all without cause for his symptoms identified. The diagnosis left was orthostatic hypotension causing the syncope. Med change during hosp was the addition of bystolic.     The fatigue was thought possibly to be due to repatha-this was stopped but the fatigue or side effects from this might linger for around 3  weeks.     The carotid US showed evidence of possible subclavian steal. He does not have an arm claudication symptoms typical for this and his neuro symptoms do not correlate with arm activity.     He reports to be doing well now except for the fatigue being the main remaining symptoms.     med hx, family hx, and soc hx reviewed and updated     Ros neg for other const, psych, ID symptoms.     OBJECTIVE: /60 (BP Location: Right arm, Patient Position: Sitting, Cuff Size: Adult Large)   Pulse 85   Temp 98.4  F (36.9  C) (Oral)   Resp 16   Wt 100.3 kg (221 lb 2 oz)   SpO2 98%   BMI 34.63 kg/m   no apparent distress   Exam:  GENERAL APPEARANCE: healthy, alert and no distress  EYES: Eyes grossly normal to inspection  HENT: ear canals and TM's normal and nose and mouth without ulcers or lesions  RESP: lungs clear to auscultation - no rales, rhonchi or wheezes  CV: regular rates and rhythm, normal S1 S2, no S3 or S4 and no murmur, click or rub -  ABDOMEN:  soft, nontender, no HSM or masses and bowel sounds normal  PSYCH: mentation appears normal and affect normal/bright     Of note his bp and pulse were the same in both arms.       ICD-10-CM    1. Fatigue, unspecified type R53.83    2. Coronary artery disease involving native heart without angina pectoris, unspecified vessel or lesion type I25.10    still in the process of determining the cause of his dizziness/fatigue. The suspicion is the repatha. He has cards appt upcoming. Otherwise he is doing ok at present. No changes in meds today. follow up based on outcome of the trial off repatha.

## 2019-01-03 LAB
CHOLEST SERPL-MCNC: 107 MG/DL (ref 100–199)
CREAT SERPL-MCNC: 1.52 MG/DL (ref 0.72–1.25)
GFR SERPL CREATININE-BSD FRML MDRD: 46 ML/MIN/1.73M2
GLUCOSE SERPL-MCNC: 100 MG/DL (ref 65–100)
HDLC SERPL-MCNC: 24 MG/DL
LDLC SERPL CALC-MCNC: 59 MG/DL
NONHDLC SERPL-MCNC: 83 MG/DL
POTASSIUM SERPL-SCNC: 4.5 MMOL/L (ref 3.5–5)
TRIGL SERPL-MCNC: 118 MG/DL
TSH SERPL-ACNC: 2.19 UIU/ML (ref 0.35–4.94)

## 2019-01-08 PROBLEM — M54.2 CERVICAL PAIN: Status: RESOLVED | Noted: 2018-03-05 | Resolved: 2019-01-08

## 2019-01-08 PROBLEM — M25.512 SHOULDER PAIN, LEFT: Status: RESOLVED | Noted: 2018-03-22 | Resolved: 2019-01-08

## 2019-01-25 DIAGNOSIS — I25.10 ATHEROSCLEROSIS OF CORONARY ARTERY OF NATIVE HEART, ANGINA PRESENCE UNSPECIFIED, UNSPECIFIED VESSEL OR LESION TYPE: Primary | ICD-10-CM

## 2019-01-26 NOTE — TELEPHONE ENCOUNTER
Nebivolol HCl (BYSTOLIC PO)     --   Sig - Route: Take 2.5 mg by mouth daily - Oral   Class: Historical           Last Written Prescription Date:  NA  Last Fill Quantity: NA,   # refills: NA  Last Office Visit: 12/27/18 WITH KIRILL  Future Office visit:       Routing refill request to provider for review/approval because:  Medication is reported/historical

## 2019-01-28 NOTE — TELEPHONE ENCOUNTER
I wonder if he is still on this. Or if was ordered by his cardiologist. If that is the case, should he be asking them for the refill in that case?     I think he is relatively new to us so may have been ordered by [facility] last pcp.

## 2019-01-29 NOTE — TELEPHONE ENCOUNTER
Dr. Quiroz review:  Bystolic was prescribed in hospital 12/24/18 (last fill by [facility] hospitalist)  Attempted to reach patient.  LMOM.  Does he have a cardiologist??? If not shall we give him a referral?  Alice Barnett RN

## 2019-01-30 RX ORDER — NEBIVOLOL 2.5 MG/1
2.5 TABLET ORAL DAILY
Qty: 90 TABLET | Refills: 3 | Status: SHIPPED | OUTPATIENT
Start: 2019-01-30 | End: 2019-11-25

## 2019-01-30 NOTE — TELEPHONE ENCOUNTER
Spoke with patient. Patient states he was in hospital for a couple of days-cardiologist in hospital started him on medication with intention for him to continue medication. Was given referral to cardiology upon discharge. Patient has been having issues scheduling an appointment. Patient states the medication is helping and he is not having side effects.     Relayed provider message below. Patient verbalized understanding. Patient states will make an appointment to follow up with cardiology    Doris Watson RN  Triage Nurse

## 2019-01-30 NOTE — TELEPHONE ENCOUNTER
I think he has a cardiologist. Yes he should see leesa at some point. I refilled the medication.     Thanks,

## 2019-02-04 ENCOUNTER — TRANSFERRED RECORDS (OUTPATIENT)
Dept: HEALTH INFORMATION MANAGEMENT | Facility: CLINIC | Age: 69
End: 2019-02-04

## 2019-02-04 LAB
CREAT SERPL-MCNC: 1.14 MG/DL (ref 0.72–1.25)
GFR SERPL CREATININE-BSD FRML MDRD: >60 ML/MIN/1.73M2
GLUCOSE SERPL-MCNC: 105 MG/DL (ref 65–100)
POTASSIUM SERPL-SCNC: 4.6 MMOL/L (ref 3.5–5)

## 2019-02-21 ENCOUNTER — OFFICE VISIT (OUTPATIENT)
Dept: FAMILY MEDICINE | Facility: CLINIC | Age: 69
End: 2019-02-21
Payer: MEDICARE

## 2019-02-21 VITALS
RESPIRATION RATE: 18 BRPM | HEART RATE: 74 BPM | BODY MASS INDEX: 34.77 KG/M2 | WEIGHT: 222 LBS | SYSTOLIC BLOOD PRESSURE: 121 MMHG | TEMPERATURE: 97 F | DIASTOLIC BLOOD PRESSURE: 61 MMHG | OXYGEN SATURATION: 100 %

## 2019-02-21 DIAGNOSIS — Z12.5 SCREENING PSA (PROSTATE SPECIFIC ANTIGEN): ICD-10-CM

## 2019-02-21 DIAGNOSIS — M62.81 MUSCLE WEAKNESS (GENERALIZED): Primary | ICD-10-CM

## 2019-02-21 PROBLEM — E66.01 MORBID OBESITY (H): Status: RESOLVED | Noted: 2018-01-17 | Resolved: 2019-02-21

## 2019-02-21 LAB
ERYTHROCYTE [DISTWIDTH] IN BLOOD BY AUTOMATED COUNT: 14.8 % (ref 10–15)
ERYTHROCYTE [SEDIMENTATION RATE] IN BLOOD BY WESTERGREN METHOD: 19 MM/H (ref 0–20)
HCT VFR BLD AUTO: 44.1 % (ref 40–53)
HGB BLD-MCNC: 14.8 G/DL (ref 13.3–17.7)
MCH RBC QN AUTO: 29.8 PG (ref 26.5–33)
MCHC RBC AUTO-ENTMCNC: 33.6 G/DL (ref 31.5–36.5)
MCV RBC AUTO: 89 FL (ref 78–100)
PLATELET # BLD AUTO: 402 10E9/L (ref 150–450)
RBC # BLD AUTO: 4.97 10E12/L (ref 4.4–5.9)
WBC # BLD AUTO: 11 10E9/L (ref 4–11)

## 2019-02-21 PROCEDURE — 82550 ASSAY OF CK (CPK): CPT | Performed by: NURSE PRACTITIONER

## 2019-02-21 PROCEDURE — 85027 COMPLETE CBC AUTOMATED: CPT | Performed by: NURSE PRACTITIONER

## 2019-02-21 PROCEDURE — 86038 ANTINUCLEAR ANTIBODIES: CPT | Performed by: NURSE PRACTITIONER

## 2019-02-21 PROCEDURE — G0103 PSA SCREENING: HCPCS | Performed by: NURSE PRACTITIONER

## 2019-02-21 PROCEDURE — 99214 OFFICE O/P EST MOD 30 MIN: CPT | Performed by: NURSE PRACTITIONER

## 2019-02-21 PROCEDURE — 85652 RBC SED RATE AUTOMATED: CPT | Performed by: NURSE PRACTITIONER

## 2019-02-21 PROCEDURE — 36415 COLL VENOUS BLD VENIPUNCTURE: CPT | Performed by: NURSE PRACTITIONER

## 2019-02-21 NOTE — PROGRESS NOTES
"  SUBJECTIVE:   Braeden Umana is a 68 year old male who presents to clinic today for the following health issues:    assessment for post polio syndrome   Pt's brother may have post polio syndrome     He had polio when he was 2-1/2 years of age.  His mother and older brother also were diagnosed with polio at the same time.  He describes having a \"light case\".  He is wondering if his symptoms may be post-polio syndrome.   Muscle weakness (mainly legs, but also arms to some degree) which has been gradual but progressive, fatigue, balance and coordination difficulty.  Even after a light work out, he will have extreme fatigue that can lasts for several days, which seems to be worsening.   He feels like symptoms started about a year ago.  He did have a carotid endarterectomy at that time.  He does have heart disease, unable to tolerate any statins due to myalgias.     He has had a recent bmp, TSH, Lyme test.           Problem list and histories reviewed & adjusted, as indicated.  Additional history: as documented        Reviewed and updated as needed this visit by clinical staff       Reviewed and updated as needed this visit by Provider         ROS:  CONSTITUTIONAL: NEGATIVE for fever, chills, change in weight  ENT/MOUTH: NEGATIVE for ear, mouth and throat problems  RESP: NEGATIVE for significant cough or SOB  CV: NEGATIVE for chest pain, palpitations or peripheral edema  GI: NEGATIVE for nausea, abdominal pain, heartburn, or change in bowel habits  MUSCULOSKELETAL: see HPI  NEURO: see HPI  PSYCHIATRIC: NEGATIVE for changes in mood or affect    OBJECTIVE:     /61   Pulse 74   Temp 97  F (36.1  C) (Oral)   Resp 18   Wt 100.7 kg (222 lb)   SpO2 100%   BMI 34.77 kg/m    Body mass index is 34.77 kg/m .  GENERAL: healthy, alert and no distress  NECK: no adenopathy, no asymmetry, masses, or scars and thyroid normal to palpation  RESP: lungs clear to auscultation - no rales, rhonchi or wheezes  CV: regular rate " and rhythm, normal S1 S2, no S3 or S4, no murmur, click or rub, no peripheral edema and peripheral pulses strong  ABDOMEN: soft, nontender, no hepatosplenomegaly, no masses and bowel sounds normal  MS: no gross musculoskeletal defects noted, no edema  NEURO: Normal tone, slight decreased strength UE bilaterally, no fasciculations, sensory exam grossly normal and mentation intact  PSYCH: mentation appears normal, affect normal/bright        ASSESSMENT/PLAN:             1. Muscle weakness (generalized)  Will check the following labs to rule out autoimmune/inflammatory cause and will refer to neurology for further evaluation.  - NEUROLOGY ADULT REFERRAL  - ESR: Erythrocyte sedimentation rate  - CBC with platelets  - CK total  - Anti Nuclear Jazz IgG by IFA with Reflex    2. Screening PSA (prostate specific antigen)    - PSA, screen        Treva Saleem NP  [facility]

## 2019-02-22 LAB
ANA SER QL IF: NEGATIVE
CK SERPL-CCNC: 42 U/L (ref 30–300)
PSA SERPL-ACNC: 0.37 UG/L (ref 0–4)

## 2019-03-04 ENCOUNTER — TRANSFERRED RECORDS (OUTPATIENT)
Dept: HEALTH INFORMATION MANAGEMENT | Facility: CLINIC | Age: 69
End: 2019-03-04

## 2019-03-14 ENCOUNTER — TRANSFERRED RECORDS (OUTPATIENT)
Dept: HEALTH INFORMATION MANAGEMENT | Facility: CLINIC | Age: 69
End: 2019-03-14

## 2019-06-06 ENCOUNTER — ANCILLARY PROCEDURE (OUTPATIENT)
Dept: GENERAL RADIOLOGY | Facility: CLINIC | Age: 69
End: 2019-06-06
Attending: FAMILY MEDICINE
Payer: MEDICARE

## 2019-06-06 ENCOUNTER — OFFICE VISIT (OUTPATIENT)
Dept: ORTHOPEDICS | Facility: CLINIC | Age: 69
End: 2019-06-06
Payer: MEDICARE

## 2019-06-06 VITALS — RESPIRATION RATE: 16 BRPM | BODY MASS INDEX: 34.77 KG/M2 | WEIGHT: 222 LBS

## 2019-06-06 DIAGNOSIS — M25.572 ACUTE LEFT ANKLE PAIN: ICD-10-CM

## 2019-06-06 DIAGNOSIS — M79.672 LEFT FOOT PAIN: Primary | ICD-10-CM

## 2019-06-06 NOTE — PROGRESS NOTES
SPORTS & ORTHOPEDIC WALK-IN VISIT 6/6/2019    Primary Care Physician: Dr. Saleem    Here today for left ankle and foot pain.  Was walking a few days ago and felt a sharp pop in the anterior ankle.  Not particularly painful at that time.  In the days following he has noticed increased swelling and ecchymosis overlying the dorsal foot and left ankle.  Has pain to direct pressure in these areas but does not have significant discomfort with walking.  Has a chronic ankle and foot issues in the past though this feels different.  No tingling or numbness.      Reason for visit:     What part of your body is injured / painful?  left ankle and left foot    What caused the injury /pain? Unsure - walking when it happened    How long ago did your injury occur or pain begin? several days ago    What are your most bothersome symptoms? Swelling and Other: bruising    How would you characterize your symptom?  aching    What makes your symptoms better? Nothing    What makes your symptoms worse? Other: nothing    Have you been previously seen for this problem? No    Medical History:    Any recent changes to your medical history? No    Any new medication prescribed since last visit? No    Have you had surgery on this body part before? No    Social History:    Occupation:     Handedness:     Exercise: not lately due to leg cramps    Review of Systems:    Do you have fever, chills, weight loss? No    Do you have any vision problems? No    Do you have any chest pain or edema? No    Do you have any shortness of breath or wheezing?  No    Do you have stomach problems? No    Do you have any numbness or focal weakness? Yes, generalized weakness    Do you have diabetes? No    Do you have problems with bleeding or clotting? Yes, on blood thinner    Do you have an rashes or other skin lesions? No         Past Medical History, Current Medications, and Allergies are reviewed in the electronic medical record as appropriate.       EXAM:Resp 16    Wt 100.7 kg (222 lb)   BMI 34.77 kg/m      General: Alert, pleasant, no distress  Left ankle and foot: warm, well perfused, SILT throughout     Inspection: Soft tissue swelling and ecchymosis over the dorsal mid metatarsal area as well as slightly anterior to the lateral malleolus.  No significant deformity.  Pes planus.     ROM: Symmetric without discomfort     Strength: Dorsiflexion, plantar flexion, inversion and eversion without significant weakness or discomfort.     Palpation: TTP over the lateral malleolus as well as over the midportion of the second and third metatarsal area.  No TTP of the tibiotalar joint.  He does have slight TTP over the medial malleolus     Special Tests: None      Imaging: xrays of left foot and ankle per radiology:   Findings:   Mild swelling around the ankle. Ankle mortise congruent. No displaced  fracture. Decreased calcaneal pitch. Plantar calcaneal spur. Os  trigonum. Mild first metatarsophalangeal degenerative changes. Chronic  bony proliferation medial malleolus. Well-corticated ossicle at the  base of the fifth metatarsal may be from remote trauma or os peroneum.  Soft tissue swelling dorsum of foot.      Assessment: Patient is a 69 year old male with acute foot pain after injury a few days ago. No sign of fracture on radiographs. Swelling and ecchymosis give concern for occult fx vs tendon injury    Recommendations:   Reviewed imaging and assessment with patient in detail  Recommended period of rest in post-op shoe or boot.   Discussed icing and elevation  Will follow up after two weeks if he is still having difficulty.     Cl Worley MD

## 2019-06-25 DIAGNOSIS — I25.10 ATHEROSCLEROSIS OF CORONARY ARTERY OF NATIVE HEART, ANGINA PRESENCE UNSPECIFIED, UNSPECIFIED VESSEL OR LESION TYPE: ICD-10-CM

## 2019-06-26 RX ORDER — CLOPIDOGREL BISULFATE 75 MG/1
TABLET ORAL
Qty: 90 TABLET | Refills: 0 | Status: SHIPPED | OUTPATIENT
Start: 2019-06-26 | End: 2019-10-08

## 2019-06-26 NOTE — TELEPHONE ENCOUNTER
Prescription approved per [facility] Refill Protocol.    Doris Watson, RN  Triage Nurse

## 2019-06-26 NOTE — TELEPHONE ENCOUNTER
"Requested Prescriptions   Pending Prescriptions Disp Refills     clopidogrel (PLAVIX) 75 MG tablet [Pharmacy Med Name: CLOPIDOGREL 75MG    TAB]  Last Written Prescription Date:  6/5/18  Last Fill Quantity: 90,  # refills: prn   Last office visit: 2/21/2019 with prescribing provider:     Future Office Visit:     90 tablet 0     Sig: TAKE 1 TABLET BY MOUTH ONCE DAILY       Plavix Passed - 6/25/2019  6:32 PM        Passed - No active PPI on record unless is Protonix        Passed - Normal HGB on file in past 12 months     Recent Labs   Lab Test 02/21/19  1337   HGB 14.8           Passed - Normal Platelets on file in past 12 months     Recent Labs   Lab Test 02/21/19  1337              Passed - Recent (12 mo) or future (30 days) visit within the authorizing provider's specialty     Patient had office visit in the last 12 months or has a visit in the next 30 days with authorizing provider or within the authorizing provider's specialty.  See \"Patient Info\" tab in inbasket, or \"Choose Columns\" in Meds & Orders section of the refill encounter.            Passed - Medication is active on med list        Passed - Patient is age 18 or older          "

## 2019-06-27 LAB
CHOLEST SERPL-MCNC: 137 MG/DL (ref 100–199)
CREAT SERPL-MCNC: 1.28 MG/DL (ref 0.72–1.25)
GFR SERPL CREATININE-BSD FRML MDRD: 55 ML/MIN/1.73M2
GLUCOSE SERPL-MCNC: 99 MG/DL (ref 65–100)
HDLC SERPL-MCNC: 25 MG/DL
LDLC SERPL CALC-MCNC: 89 MG/DL
NONHDLC SERPL-MCNC: 112 MG/DL
POTASSIUM SERPL-SCNC: 4.6 MMOL/L (ref 3.5–5)
TRIGL SERPL-MCNC: 113 MG/DL

## 2019-09-11 ENCOUNTER — OFFICE VISIT (OUTPATIENT)
Dept: FAMILY MEDICINE | Facility: CLINIC | Age: 69
End: 2019-09-11
Payer: MEDICARE

## 2019-09-11 VITALS — RESPIRATION RATE: 18 BRPM

## 2019-09-11 DIAGNOSIS — L21.9 SEBORRHEIC DERMATITIS: ICD-10-CM

## 2019-09-11 DIAGNOSIS — B37.2 CANDIDAL INTERTRIGO: Primary | ICD-10-CM

## 2019-09-11 PROCEDURE — 99213 OFFICE O/P EST LOW 20 MIN: CPT | Performed by: NURSE PRACTITIONER

## 2019-09-11 RX ORDER — NYSTATIN 100000 [USP'U]/G
POWDER TOPICAL DAILY
Qty: 60 G | Status: SHIPPED | OUTPATIENT
Start: 2019-09-11 | End: 2021-01-21

## 2019-09-11 RX ORDER — KETOCONAZOLE 20 MG/ML
SHAMPOO TOPICAL
Qty: 120 ML | Status: SHIPPED | OUTPATIENT
Start: 2019-09-11 | End: 2022-01-01

## 2019-09-11 RX ORDER — KETOCONAZOLE 20 MG/G
CREAM TOPICAL 2 TIMES DAILY
Qty: 60 G | Status: SHIPPED | OUTPATIENT
Start: 2019-09-11 | End: 2019-11-25 | Stop reason: ALTCHOICE

## 2019-09-11 ASSESSMENT — MIFFLIN-ST. JEOR: SCORE: 1635.56

## 2019-09-11 NOTE — PROGRESS NOTES
"Subjective     Braeden Umana is a 69 year old male who presents to clinic today for the following health issues:    HPI   Rash  Duration of complaint: x several years; recurring  Location: gluteal fold and now under breasts and on penis  Can be itchy at times    He was prescribed Nystatin cream, which helps, but never completely clears up rash.  He did run out of this and symptoms are worsening.       He has chronic problems with flaking skin on scalp and eyebrows, eyelids at times.    He has been working with a functional medicine specialist, losing weight.  Mood is stable.         Reviewed and updated as needed this visit by Provider  Tobacco  Allergies  Meds  Problems  Med Hx  Surg Hx  Fam Hx         Review of Systems   ROS COMP: CONSTITUTIONAL: NEGATIVE for fever, chills, change in weight  INTEGUMENTARY/SKIN: see HPI  ENT/MOUTH: NEGATIVE for ear, mouth and throat problems  RESP: NEGATIVE for significant cough or SOB  CV: NEGATIVE for chest pain, palpitations or peripheral edema  MUSCULOSKELETAL: NEGATIVE for significant arthralgias or myalgia  NEURO: NEGATIVE for weakness, dizziness or paresthesias  PSYCHIATRIC: NEGATIVE for changes in mood or affect      Objective    BP (P) 130/76   Pulse (P) 97   Temp (P) 98.2  F (36.8  C) (Oral)   Resp 18   Ht (P) 1.689 m (5' 6.5\")   Wt (P) 93 kg (205 lb)   SpO2 (P) 98%   BMI (P) 32.59 kg/m    Body mass index is 32.59 kg/m  (pended).  Physical Exam   GENERAL: healthy, alert and no distress  RESP: lungs clear to auscultation - no rales, rhonchi or wheezes  CV: regular rate and rhythm, normal S1 S2, no S3 or S4, no murmur, click or rub, no peripheral edema and peripheral pulses strong  SKIN: erythematous plaques under breasts, in skin fold of penis and the gluteal fold  PSYCH: mentation appears normal, affect normal/bright            Assessment & Plan     1. Candidal intertrigo  Keep areas dry as possible.  Change to Nizoral cream, continue Nystatin powder " "once cleared.   - ketoconazole (NIZORAL) 2 % external cream; Apply topically 2 times daily Apply to affected areas BID  Dispense: 60 g; Refill: PRN  - nystatin (MYCOSTATIN) 210914 UNIT/GM external powder; Apply topically daily  Dispense: 60 g; Refill: PRN    2. Seborrheic dermatitis  Discussed the use and indication of this medication as well as potential side effects.   - ketoconazole (NIZORAL) 2 % external shampoo; Use twice weekly  Dispense: 120 mL; Refill: PRN     BMI:   Estimated body mass index is 32.59 kg/m  (pended) as calculated from the following:    Height as of this encounter: (P) 1.689 m (5' 6.5\").    Weight as of this encounter: (P) 93 kg (205 lb).   Weight management plan: Discussed healthy diet and exercise guidelines            Return in about 6 months (around 3/11/2020).    Treva Saleem NP  [facility]        "

## 2019-09-11 NOTE — PATIENT INSTRUCTIONS
For significant itching, try adding equal parts hydrocortisone cream (Cortaid) to the ketoconazole cream.      Patient Education     Candida Skin Infection (Adult)  Candida is type of yeast. It grows naturally on the skin and in the mouth. If it grows out of control, it can cause an infection. Candida can cause infections in the genital area, skin folds, in the mouth, and under the breasts. Anyone can get this infection. It is more common in a person with a weak immune system, such as from diabetes, HIV, or cancer. It s also more common in someone who has been on antibiotic therapy. And it s more common people who are overweight or who have incontinence. Wearing tight-fitting clothing and taking part in activities with lots of skin-to-skin contact can also put you at risk.  Candida causes the skin to become bright red and inflamed. The border of the infected part of the skin is often raised. The infection causes pain and itching. Sometimes the skin peels and bleeds. In the mouth, candida is called thrush, and may cause white thickened areas.  A Candida rash is most often treated with an antifungal cream or ointment. The rash will clear a few days after starting the medicine. Infections that don t go away may need a prescription medicine. In rare cases, a bacterial infection can also occur.  Home care  Your healthcare provider will recommend an antifungal cream or ointment for the rash. He or she may also prescribe a medicine for the itch. Follow all instructions for using these medicines. Don t use cornstarch powder. Cornstarch can cause the Candida infection to get worse.  General care:    Keep your skin clean by washing the area twice a day.    Use the cream as directed until your rash is gone. Once the skin has healed, keep it dry to prevent another infection.     If you are overweight, talk with your healthcare provider about a plan to lose excess weight.    Avoid clothes that fit tightly.  Follow-up  care  Follow up with your healthcare provider, or as advised. Your rash will clear in 7 to 14 days. Call your healthcare provider if the rash is not gone after 14 days.  When to seek medical advice  Call your healthcare provider right away if any of these occur:    Pain or redness that gets worse or spreads    Fluid coming from the skin    Yellow crusts on the skin    Fever of 100.4 F (38 C) or higher, or as directed by your healthcare provider  Date Last Reviewed: 9/1/2016 2000-2018 [employer]. [address] [place] [address]. All rights reserved. This information is not intended as a substitute for professional medical care. Always follow your healthcare professional's instructions.

## 2019-09-12 ENCOUNTER — TELEPHONE (OUTPATIENT)
Dept: FAMILY MEDICINE | Facility: CLINIC | Age: 69
End: 2019-09-12

## 2019-09-12 NOTE — TELEPHONE ENCOUNTER
Reason for Call:  Medication or medication refill:    Do you use a [facility]?  Name of the pharmacy and phone number for the current request:  [id number] - [place], [place] - [id number]    Name of the medication requested: ketoconazole (NIZORAL) 2 % external cream    Other request: Pharmacy is requesting a call back to clarify what part of the body this medication is to be applied to, for billing purposes they need that information. Please assist. Thanks!    Can we leave a detailed message on this number? Not Applicable    Phone number patient can be reached at: see contacts    Best Time: Any    Call taken on 9/12/2019 at 9:35 AM by Raeann Cabrera

## 2019-09-12 NOTE — TELEPHONE ENCOUNTER
Nurse called and let the pharmacy know areas of RX cream application. Read the visit notes.  Alice Barnett RN

## 2019-10-01 ENCOUNTER — TRANSFERRED RECORDS (OUTPATIENT)
Dept: HEALTH INFORMATION MANAGEMENT | Facility: CLINIC | Age: 69
End: 2019-10-01

## 2019-10-03 ENCOUNTER — HEALTH MAINTENANCE LETTER (OUTPATIENT)
Age: 69
End: 2019-10-03

## 2019-10-04 ENCOUNTER — OFFICE VISIT (OUTPATIENT)
Dept: FAMILY MEDICINE | Facility: CLINIC | Age: 69
End: 2019-10-04
Payer: MEDICARE

## 2019-10-04 VITALS
SYSTOLIC BLOOD PRESSURE: 134 MMHG | DIASTOLIC BLOOD PRESSURE: 88 MMHG | HEART RATE: 98 BPM | RESPIRATION RATE: 16 BRPM | OXYGEN SATURATION: 99 % | HEIGHT: 67 IN | WEIGHT: 203 LBS | BODY MASS INDEX: 31.86 KG/M2 | TEMPERATURE: 99.4 F

## 2019-10-04 DIAGNOSIS — Z71.6 TOBACCO ABUSE COUNSELING: ICD-10-CM

## 2019-10-04 DIAGNOSIS — L03.221 CELLULITIS OF NECK: ICD-10-CM

## 2019-10-04 DIAGNOSIS — Z72.0 TOBACCO ABUSE: ICD-10-CM

## 2019-10-04 DIAGNOSIS — L40.8 INVERSE PSORIASIS: Primary | ICD-10-CM

## 2019-10-04 DIAGNOSIS — Z87.891 PERSONAL HISTORY OF TOBACCO USE: ICD-10-CM

## 2019-10-04 PROCEDURE — 99214 OFFICE O/P EST MOD 30 MIN: CPT | Performed by: PREVENTIVE MEDICINE

## 2019-10-04 RX ORDER — DESONIDE 0.5 MG/G
1 CREAM TOPICAL 2 TIMES DAILY
Qty: 60 G | Refills: 0 | Status: SHIPPED | OUTPATIENT
Start: 2019-10-04 | End: 2019-11-25

## 2019-10-04 ASSESSMENT — MIFFLIN-ST. JEOR: SCORE: 1626.49

## 2019-10-04 NOTE — PROGRESS NOTES
Chief Complaint   Patient presents with     Fever     Subjective     Braeden Umana is a 69 year old male who presents to clinic today for the following health issues:    HPI   Fever      Duration: wed night    Description (location/character/radiation): aches on body, neck feels warm and feels like got hit with a bat    Intensity:  severe    History (similar episodes/previous evaluation): None    Precipitating or alleviating factors: None    Therapies tried and outcome: tylenol but didn't help much    Patient states he got a fungus cream from Dr. Saleem and its not helping and would like to discuss about it.     He has a rash on his upper buttock crease, under his left breast and on his penis.  He also noted a warmth and redness on the back of his neck.    Patient Active Problem List   Diagnosis     Coronary atherosclerosis     Hypertrophy (benign) of prostate     Idiopathic urticaria     ANXIETY      Esophageal reflux     Idiopathic angioedema     Hyperlipidemia LDL goal <70     Carotid artery stenosis     Advanced directives, counseling/discussion     Hypertension goal BP (blood pressure) < 140/90     Achilles bursitis or tendinitis     Sleep apnea     Past Surgical History:   Procedure Laterality Date     SURGICAL HISTORY OF -       sinus surgery     SURGICAL HISTORY OF -       angiogram and stent placement     SURGICAL HISTORY OF -       orchioplexy     SURGICAL HISTORY OF -   12/31/12    angiogram and 4 stents     SURGICAL HISTORY OF -   2/26/13    angiogram and stent     SURGICAL HISTORY OF -   1/15    angiogram and stents     SURGICAL HISTORY OF -   5/15    angiogram and stent       Social History     Tobacco Use     Smoking status: Former Smoker     Packs/day: 0.00     Smokeless tobacco: Never Used     Tobacco comment: x30 years+   Substance Use Topics     Alcohol use: No     Family History   Problem Relation Age of Onset     C.A.D. Mother         bypass     Hypertension Mother      C.A.D. Father          late 50's     Hypertension Father      Neurologic Disorder Father         parkinsons     C.A.D. Brother         x2/CAD angioplasty age 54     Other - See Comments Brother         Possible Post Polio Syndrome     Allergies Brother      Diabetes No family hx of      Cerebrovascular Disease No family hx of      Cancer - colorectal No family hx of      Prostate Cancer No family hx of            PROBLEMS TO ADD ON...  Reviewed and updated as needed this visit by Provider         Review of Systems   ROS COMP: Constitutional, HEENT, cardiovascular, pulmonary, GI, , musculoskeletal, neuro endocrine and psych systems are negative, except as otherwise noted.      Objective    There were no vitals taken for this visit.  There is no height or weight on file to calculate BMI.  Physical Exam   GENERAL: healthy, alert and no distress  EYES: Eyes grossly normal to inspection, PERRL and conjunctivae and sclerae normal  HENT: ear canals and TM's normal, nose and mouth without ulcers or lesions  NECK: no adenopathy, no asymmetry, masses, or scars and thyroid normal to palpation  RESP: lungs clear to auscultation - no rales, rhonchi or wheezes  CV: regular rate and rhythm, normal S1 S2, no S3 or S4, no murmur, click or rub, no peripheral edema and peripheral pulses strong  ABDOMEN: soft, nontender, no hepatosplenomegaly, no masses and bowel sounds normal  MS: no gross musculoskeletal defects noted, no edema  SKIN:  - inverse psoriasis rash on upper buttock crease, left chest under breast and glans penis  Also warmth and redness on neck just under hairline  NEURO: Normal strength and tone, mentation intact and speech normal  PSYCH: mentation appears normal, affect normal/bright            Assessment & Plan     1. Inverse psoriasis  - desonide (DESOWEN) 0.05 % external cream; Apply 1 g topically 2 times daily  Dispense: 60 g; Refill: 0  Advise low potency topical steroid for next two weeks  Follow up for reassessment    2. Cellulitis  "of neck  - amoxicillin-clavulanate (AUGMENTIN) 875-125 MG tablet; Take 1 tablet by mouth 2 times daily  Dispense: 14 tablet; Refill: 0  Start augmentin 875 mg two times per day for 1 week  Follow up in 2 days for recheck  Outlined with marker.    3. Tobacco abuse    - Tobacco Cessation - Order to Satisfy Health Maintenance  - Prof Fee: Shared Decision Making Visit for Lung Cancer Screening    4. Tobacco abuse counseling      5. Personal history of tobacco use         BMI:   Estimated body mass index is 32.27 kg/m  as calculated from the following:    Height as of this encounter: 1.689 m (5' 6.5\").    Weight as of this encounter: 92.1 kg (203 lb).   Weight management plan: Discussed healthy diet and exercise guidelines        See Patient Instructions    No follow-ups on file.    Lawson Burton MD, MD  [facility]        "

## 2019-10-04 NOTE — PATIENT INSTRUCTIONS

## 2019-10-08 DIAGNOSIS — I25.10 ATHEROSCLEROSIS OF CORONARY ARTERY OF NATIVE HEART, ANGINA PRESENCE UNSPECIFIED, UNSPECIFIED VESSEL OR LESION TYPE: ICD-10-CM

## 2019-10-08 NOTE — TELEPHONE ENCOUNTER
"Requested Prescriptions   Pending Prescriptions Disp Refills     clopidogrel (PLAVIX) 75 MG tablet [Pharmacy Med Name: CLOPIDOGREL 75MG    TAB]  Last Written Prescription Date:  6-26-19  Last Fill Quantity: 90 tab,  # refills: 0   Last office visit: 10/4/2019 with prescribing provider:  MERI Burton   Future Office Visit:   90 tablet 0     Sig: TAKE 1 TABLET BY MOUTH ONCE DAILY       Plavix Passed - 10/8/2019  7:31 AM        Passed - No active PPI on record unless is Protonix        Passed - Normal HGB on file in past 12 months     Recent Labs   Lab Test 02/21/19  1337   HGB 14.8           Passed - Normal Platelets on file in past 12 months     Recent Labs   Lab Test 02/21/19  1337              Passed - Recent (12 mo) or future (30 days) visit within the authorizing provider's specialty     Patient has had an office visit with the authorizing provider or a provider within the authorizing providers department within the previous 12 mos or has a future within next 30 days. See \"Patient Info\" tab in inbasket, or \"Choose Columns\" in Meds & Orders section of the refill encounter.            Passed - Medication is active on med list        Passed - Patient is age 18 or older         "

## 2019-10-13 RX ORDER — CLOPIDOGREL BISULFATE 75 MG/1
TABLET ORAL
Qty: 90 TABLET | Refills: 0 | Status: SHIPPED | OUTPATIENT
Start: 2019-10-13 | End: 2020-01-12

## 2019-10-13 NOTE — TELEPHONE ENCOUNTER
Prescription approved per [facility] according to the [facility] Refill Protocol.  Sola Goldstein RN

## 2019-10-14 ENCOUNTER — TELEPHONE (OUTPATIENT)
Dept: FAMILY MEDICINE | Facility: CLINIC | Age: 69
End: 2019-10-14

## 2019-10-14 NOTE — TELEPHONE ENCOUNTER
Left message for patient. This med was rx'd in past for psoriasis.want to confirm the skin areas where this RX is being applied.  Alice Barnett RN

## 2019-10-14 NOTE — TELEPHONE ENCOUNTER
Reason for Call:  Other prescription    Detailed comments: Pharmacy calling to ask which part of the body the fluticasone (CUTIVATE) 0.05 % external cream is being applied to.       Call taken on 10/14/2019 at 2:40 PM by Deborah Lopez

## 2019-10-14 NOTE — TELEPHONE ENCOUNTER
Prior Authorization Retail Medication Request    Medication/Dose: desonide (DESOWEN) 0.05 % external cream  ICD code (if different than what is on RX):  Previously Tried and Failed:  Rationale:    Insurance Name:    Insurance ID:  [id number]    Pharmacy Information (if different than what is on RX)  Name:  Phone:    Please include previous medications tried and failed.  Please ask insurance for medications on formulary.

## 2019-10-15 NOTE — TELEPHONE ENCOUNTER
This was previously denied, please see encounter date 10/4/19. The covered alternatives are also listed in that encounter. If you would like to appeal, we will need a letter of medical necessity routed back to the PA team.

## 2019-10-15 NOTE — TELEPHONE ENCOUNTER
Prior Authorization Not Needed per Insurance    Medication: desonide (DESOWEN) 0.05 % external cream - PA NOT NEEDED  Insurance Company: [employer] - Phone [phone] Fax [phone]  Expected CoPay:      Pharmacy Filling the Rx: [facility] [id number] [facility], [place] - [address]  Pharmacy Notified: No  Patient Notified: No    PA NOT NEEDED DUE TO PREVIOUS DENIAL FROM

## 2019-11-25 ENCOUNTER — ANCILLARY PROCEDURE (OUTPATIENT)
Dept: GENERAL RADIOLOGY | Facility: CLINIC | Age: 69
End: 2019-11-25
Attending: FAMILY MEDICINE
Payer: MEDICARE

## 2019-11-25 ENCOUNTER — OFFICE VISIT (OUTPATIENT)
Dept: FAMILY MEDICINE | Facility: CLINIC | Age: 69
End: 2019-11-25
Payer: MEDICARE

## 2019-11-25 VITALS
DIASTOLIC BLOOD PRESSURE: 72 MMHG | WEIGHT: 201 LBS | HEART RATE: 81 BPM | SYSTOLIC BLOOD PRESSURE: 100 MMHG | TEMPERATURE: 98.1 F | BODY MASS INDEX: 31.96 KG/M2 | OXYGEN SATURATION: 97 %

## 2019-11-25 DIAGNOSIS — R05.9 COUGH: ICD-10-CM

## 2019-11-25 DIAGNOSIS — R05.9 COUGH: Primary | ICD-10-CM

## 2019-11-25 DIAGNOSIS — R09.82 POST-NASAL DRAINAGE: ICD-10-CM

## 2019-11-25 PROCEDURE — 71046 X-RAY EXAM CHEST 2 VIEWS: CPT

## 2019-11-25 PROCEDURE — 99214 OFFICE O/P EST MOD 30 MIN: CPT | Performed by: FAMILY MEDICINE

## 2019-11-25 NOTE — PROGRESS NOTES
Subjective     Braeden Umana is a 69 year old male who presents to clinic today for the following health issues:    HPI   Acute Illness   Acute illness concerns: cough, congestion  Onset: 2 weeks     Fever: no    Chills/Sweats: no     Headache (location?): YES    Sinus Pressure:mild symptoms     Conjunctivitis:  no    Ear Pain: no    Rhinorrhea: YES    Congestion: YES    Sore Throat: no      Cough: YES-non-productive    Wheeze: no    Decreased Appetite: no    Nausea: no    Vomiting: no    Diarrhea:  no    Dysuria/Freq.: no    Fatigue/Achiness: YES    Sick/Strep Exposure: no      Therapies Tried and outcome: Mucinex - helping       Symptoms started off in his chest and then moved on to his sinuses. Then symptoms are now in the chest. He has a dry non-productive cough and feeling tired.   He endorses PND and mild dyspnea with activity.   Symptoms affecting sleep.      Reviewed and updated as needed this visit by Provider         Review of Systems   ROS COMP: Constitutional, HEENT, cardiovascular, pulmonary, gi and gu systems are negative, except as otherwise noted.      Objective    There were no vitals taken for this visit.  There is no height or weight on file to calculate BMI.  Physical Exam   /72   Pulse 81   Temp 98.1  F (36.7  C) (Oral)   Wt 91.2 kg (201 lb)   SpO2 97%   BMI 31.96 kg/m    GENERAL: healthy, alert and no distress  EYES: Eyes grossly normal to inspection,  HENT: ear canals with cerumen and TM not visualized, nose and mouth without ulcers or lesions, no sinus tenderness   NECK: no adenopathy  RESP: lungs clear to auscultation with some wheezes   CV: regular rate and rhythm, normal S1 S2    Diagnostic Test Results:  none         Assessment & Plan         1. Cough  - due to symptoms and exam, ordered CXR to r/u pneumonia  - per my read CXR showed no acute changes; official read pending  - advised to continue current tx  - declined albuterol inhaler   - if symptoms are not improving in a  few days then I'll send in course of abx to tx superimposed bacterial infection   - XR Chest 2 Views; Future    2. Post-nasal drainage  - advised to start Flonase or Nasacort Werner Nuno MD  [facility]

## 2019-12-02 ENCOUNTER — TELEPHONE (OUTPATIENT)
Dept: FAMILY MEDICINE | Facility: CLINIC | Age: 69
End: 2019-12-02

## 2019-12-02 DIAGNOSIS — R91.8 ABNORMALITY OF LUNG ON CHEST X-RAY: Primary | ICD-10-CM

## 2019-12-02 NOTE — TELEPHONE ENCOUNTER
Dr. Nuno-Patient would like to proceed with PFT. Order pended.    Writer called patient and reviewed message per Dr. Nuno.    Patient verbalized understanding and in agreement with plan.    Thank you!  MICHELLE GuthrieN, RN

## 2019-12-02 NOTE — TELEPHONE ENCOUNTER
RN, can you please inform pt that xray showed no acute changes. It did show that he could have possible COPD. He would benefit from pulmonary function testing for further evaluation. If pt is interested then I'll place order.   DM

## 2019-12-04 ENCOUNTER — MYC MEDICAL ADVICE (OUTPATIENT)
Dept: FAMILY MEDICINE | Facility: CLINIC | Age: 69
End: 2019-12-04

## 2019-12-04 DIAGNOSIS — R05.9 COUGH: Primary | ICD-10-CM

## 2019-12-04 NOTE — TELEPHONE ENCOUNTER
Called patient, informed patient PFT order has been signed, gave patient scheduling information for PFT. Patient verbalized understanding and is in agreement with plan    Thank You!  Doris Watson, RN  Triage Nurse

## 2019-12-16 ENCOUNTER — HEALTH MAINTENANCE LETTER (OUTPATIENT)
Age: 69
End: 2019-12-16

## 2019-12-23 DIAGNOSIS — R05.9 COUGH: ICD-10-CM

## 2019-12-25 LAB
DLCOUNC-%PRED-PRE: 86 %
DLCOUNC-PRE: 20 ML/MIN/MMHG
DLCOUNC-PRED: 23.07 ML/MIN/MMHG
ERV-%PRED-PRE: 201 %
ERV-PRE: 1.03 L
ERV-PRED: 0.51 L
EXPTIME-PRE: 6.23 SEC
FEF2575-%PRED-PRE: 140 %
FEF2575-PRE: 3.11 L/SEC
FEF2575-PRED: 2.21 L/SEC
FEFMAX-%PRED-PRE: 135 %
FEFMAX-PRE: 10.17 L/SEC
FEFMAX-PRED: 7.49 L/SEC
FEV1-%PRED-PRE: 84 %
FEV1-PRE: 2.4 L
FEV1FEV6-PRE: 85 %
FEV1FEV6-PRED: 78 %
FEV1FVC-PRE: 85 %
FEV1FVC-PRED: 76 %
FEV1SVC-PRE: 75 %
FEV1SVC-PRED: 69 %
FIFMAX-PRE: 7.54 L/SEC
FRCPLETH-%PRED-PRE: 75 %
FRCPLETH-PRE: 2.63 L
FRCPLETH-PRED: 3.5 L
FVC-%PRED-PRE: 76 %
FVC-PRE: 2.83 L
FVC-PRED: 3.72 L
IC-%PRED-PRE: 59 %
IC-PRE: 2.16 L
IC-PRED: 3.61 L
RVPLETH-%PRED-PRE: 63 %
RVPLETH-PRE: 1.6 L
RVPLETH-PRED: 2.54 L
TLCPLETH-%PRED-PRE: 74 %
TLCPLETH-PRE: 4.79 L
TLCPLETH-PRED: 6.42 L
VA-%PRED-PRE: 75 %
VA-PRE: 4.27 L
VC-%PRED-PRE: 77 %
VC-PRE: 3.18 L
VC-PRED: 4.12 L

## 2020-01-11 DIAGNOSIS — I25.10 ATHEROSCLEROSIS OF CORONARY ARTERY OF NATIVE HEART, ANGINA PRESENCE UNSPECIFIED, UNSPECIFIED VESSEL OR LESION TYPE: ICD-10-CM

## 2020-01-12 RX ORDER — CLOPIDOGREL BISULFATE 75 MG/1
TABLET ORAL
Qty: 90 TABLET | Refills: 0 | Status: SHIPPED | OUTPATIENT
Start: 2020-01-12 | End: 2020-01-15

## 2020-01-13 NOTE — TELEPHONE ENCOUNTER
Signed Prescriptions:                        Disp   Refills    clopidogrel (PLAVIX) 75 MG tablet          90 tab*0        Sig: TAKE 1 TABLET BY MOUTH ONCE DAILY  Authorizing Provider: MOLLY BERMEO  Ordering User: JUNE LIM

## 2020-01-13 NOTE — TELEPHONE ENCOUNTER
"Requested Prescriptions   Pending Prescriptions Disp Refills     clopidogrel (PLAVIX) 75 MG tablet [Pharmacy Med Name: Clopidogrel Bisulfate 75 MG Oral Tablet]  0     Sig: TAKE 1 TABLET BY MOUTH ONCE DAILY      Last Written Prescription Date:  10/13/2019  Last Fill Quantity: 90 tablet   ,  # refills: 0   Last Office Visit: 11/25/2019   Future Office Visit:    Next 5 appointments (look out 90 days)    Jean Paul 15, 2020  3:15 PM CST  Office Visit with Treva [facility]em NP  [facility] [address] [place] [address]  [phone]              Plavix Passed - 1/11/2020  3:23 PM        Passed - No active PPI on record unless is Protonix        Passed - Normal HGB on file in past 12 months     Recent Labs   Lab Test 02/21/19  1337   HGB 14.8           Passed - Normal Platelets on file in past 12 months     Recent Labs   Lab Test 02/21/19  1337              Passed - Recent (12 mo) or future (30 days) visit within the authorizing provider's specialty     Patient has had an office visit with the authorizing provider or a provider within the authorizing providers department within the previous 12 mos or has a future within next 30 days. See \"Patient Info\" tab in inbasket, or \"Choose Columns\" in Meds & Orders section of the refill encounter.          Passed - Medication is active on med list        Passed - Patient is age 18 or older          "

## 2020-01-15 ENCOUNTER — OFFICE VISIT (OUTPATIENT)
Dept: FAMILY MEDICINE | Facility: CLINIC | Age: 70
End: 2020-01-15
Payer: MEDICARE

## 2020-01-15 VITALS — BODY MASS INDEX: 32.75 KG/M2 | WEIGHT: 206 LBS | OXYGEN SATURATION: 98 % | TEMPERATURE: 97.9 F | HEART RATE: 97 BPM

## 2020-01-15 DIAGNOSIS — R94.2 ABNORMAL PFTS (PULMONARY FUNCTION TESTS): Primary | ICD-10-CM

## 2020-01-15 DIAGNOSIS — I25.10 ATHEROSCLEROSIS OF CORONARY ARTERY OF NATIVE HEART, ANGINA PRESENCE UNSPECIFIED, UNSPECIFIED VESSEL OR LESION TYPE: ICD-10-CM

## 2020-01-15 DIAGNOSIS — I10 HYPERTENSION GOAL BP (BLOOD PRESSURE) < 140/90: ICD-10-CM

## 2020-01-15 DIAGNOSIS — E78.5 HYPERLIPIDEMIA LDL GOAL <70: ICD-10-CM

## 2020-01-15 PROCEDURE — 99214 OFFICE O/P EST MOD 30 MIN: CPT | Performed by: NURSE PRACTITIONER

## 2020-01-15 RX ORDER — BUDESONIDE AND FORMOTEROL FUMARATE DIHYDRATE 80; 4.5 UG/1; UG/1
2 AEROSOL RESPIRATORY (INHALATION) 2 TIMES DAILY
Qty: 6.9 G | Status: SHIPPED | OUTPATIENT
Start: 2020-01-15 | End: 2020-01-20

## 2020-01-15 RX ORDER — NITROGLYCERIN 0.4 MG/1
0.4 TABLET SUBLINGUAL EVERY 5 MIN PRN
Qty: 25 TABLET | Status: SHIPPED | OUTPATIENT
Start: 2020-01-15 | End: 2022-01-01

## 2020-01-15 RX ORDER — CLOPIDOGREL BISULFATE 75 MG/1
75 TABLET ORAL DAILY
Qty: 90 TABLET | Status: SHIPPED | OUTPATIENT
Start: 2020-01-15 | End: 2021-02-22

## 2020-01-15 RX ORDER — LOSARTAN POTASSIUM 50 MG/1
50 TABLET ORAL DAILY
Qty: 90 TABLET | Status: SHIPPED | OUTPATIENT
Start: 2020-01-15 | End: 2020-03-18

## 2020-01-15 RX ORDER — EZETIMIBE 10 MG/1
10 TABLET ORAL DAILY
Qty: 90 TABLET | Status: SHIPPED | OUTPATIENT
Start: 2020-01-15

## 2020-01-15 NOTE — PROGRESS NOTES
SUBJECTIVE:  Braeden Umana, a 69 year old male scheduled an appointment to discuss the following issues:     Abnormal PFTs (pulmonary function tests)  He has prolonged coughs after URIs, about 3 episodes in the past year.  Cough is non-productive.   He is a former smoker.   He did have recent PFTs done after the radiologist noted findings on his last CXR that were consistent for COPD.     Hypertension goal BP (blood pressure) < 140/90  Atherosclerosis of coronary artery of native heart, angina presence unspecified, unspecified vessel or lesion type  Hyperlipidemia LDL goal <70     Medical, social, surgical, and family histories reviewed.    ROS:  CONSTITUTIONAL: NEGATIVE for fever, chills  EYES: NEGATIVE for vision changes   ENT/MOUTH: NEGATIVE for ear, mouth and throat problems  RESP:as above  CV: NEGATIVE for chest pain, palpitations   GI: NEGATIVE for nausea, abdominal pain, heartburn, or change in bowel habits  MUSCULOSKELETAL: NEGATIVE for significant arthralgias or myalgia  NEURO: NEGATIVE for weakness, dizziness or paresthesias or headache  PSYCHIATRIC: NEGATIVE for changes in mood or affect    OBJECTIVE:  BP (P) 137/79   Pulse 97   Temp 97.9  F (36.6  C) (Oral)   Wt 93.4 kg (206 lb)   SpO2 98%   BMI 32.75 kg/m    EXAM:  GENERAL APPEARANCE: healthy, alert and no distress  HENT: ear canals and TM's normal and nose and mouth without ulcers or lesions  RESP: lungs clear to auscultation - no rales, rhonchi or wheezes  CV: regular rates and rhythm, normal S1 S2, no S3 or S4 and no murmur, click or rub -  PSYCH: mentation appears normal and affect normal/bright    ASSESSMENT/PLAN:  (R94.2) Abnormal PFTs (pulmonary function tests)  (primary encounter diagnosis)  Comment:   Plan: Mild restrictive pattern with normal diffusing capacity and normal/high lung volume, likely chest wall etiology.  May be related to kyphosis, age, obesity.     (I10) Hypertension goal BP (blood pressure) < 140/90  Comment: at  goal  Plan: losartan (COZAAR) 50 MG tablet        The current medical regimen is effective;  continue present plan and medications.     (I25.10) Atherosclerosis of coronary artery of native heart, angina presence unspecified, unspecified vessel or lesion type  Comment:   Plan: clopidogrel (PLAVIX) 75 MG tablet,         nitroGLYcerin (NITROSTAT) 0.4 MG sublingual         tablet        He follows regularly with cardiology.     (E78.5) Hyperlipidemia LDL goal <70  Comment:   Plan: ezetimibe (ZETIA) 10 MG tablet        See above.

## 2020-01-22 ENCOUNTER — MYC MEDICAL ADVICE (OUTPATIENT)
Dept: FAMILY MEDICINE | Facility: CLINIC | Age: 70
End: 2020-01-22

## 2020-01-22 NOTE — TELEPHONE ENCOUNTER
I had left him a voicemail after his last appointment.  I fully reviewed all parts of his formal PFTs and do not think he has COPD.  The pattern is more typical for incomplete lung expansion (due to age, obesity, or changes in chest wall - like kyphosis).  He does not need to use a steroid inhaler.

## 2020-02-07 ENCOUNTER — OFFICE VISIT (OUTPATIENT)
Dept: FAMILY MEDICINE | Facility: CLINIC | Age: 70
End: 2020-02-07
Payer: MEDICARE

## 2020-02-07 DIAGNOSIS — H61.23 BILATERAL IMPACTED CERUMEN: Primary | ICD-10-CM

## 2020-02-07 PROCEDURE — 99213 OFFICE O/P EST LOW 20 MIN: CPT | Mod: 25 | Performed by: NURSE PRACTITIONER

## 2020-02-07 PROCEDURE — 69209 REMOVE IMPACTED EAR WAX UNI: CPT | Mod: 50 | Performed by: NURSE PRACTITIONER

## 2020-02-07 RX ORDER — ACETAMINOPHEN 160 MG
2000 TABLET,DISINTEGRATING ORAL
COMMUNITY
Start: 2019-06-27

## 2020-02-07 RX ORDER — ATORVASTATIN CALCIUM 10 MG/1
10 TABLET, FILM COATED ORAL
COMMUNITY
Start: 2019-06-27 | End: 2020-03-18

## 2020-02-07 RX ORDER — ATENOLOL 50 MG/1
50 TABLET ORAL
COMMUNITY
Start: 2017-03-31 | End: 2020-03-18

## 2020-02-07 RX ORDER — DULOXETIN HYDROCHLORIDE 60 MG/1
60 CAPSULE, DELAYED RELEASE ORAL
COMMUNITY
Start: 2017-09-22

## 2020-02-07 RX ORDER — ATORVASTATIN CALCIUM 10 MG/1
TABLET, FILM COATED ORAL
COMMUNITY
Start: 2019-12-27 | End: 2020-03-18

## 2020-02-07 RX ORDER — KETOCONAZOLE 20 MG/G
CREAM TOPICAL
Refills: 99 | COMMUNITY
Start: 2019-09-12

## 2020-02-07 RX ORDER — HYDROCHLOROTHIAZIDE 25 MG/1
TABLET ORAL
Refills: 3 | COMMUNITY
Start: 2019-10-01 | End: 2020-03-18

## 2020-02-07 RX ORDER — INFLUENZA A VIRUS A/VICTORIA/4897/2022 IVR-238 (H1N1) ANTIGEN (FORMALDEHYDE INACTIVATED), INFLUENZA A VIRUS A/CALIFORNIA/122/2022 SAN-022 (H3N2) ANTIGEN (FORMALDEHYDE INACTIVATED), AND INFLUENZA B VIRUS B/MICHIGAN/01/2021 ANTIGEN (FORMALDEHYDE INACTIVATED) 60; 60; 60 UG/.5ML; UG/.5ML; UG/.5ML
INJECTION, SUSPENSION INTRAMUSCULAR
Refills: 0 | COMMUNITY
Start: 2019-11-09 | End: 2021-01-21

## 2020-02-07 RX ORDER — VITAMINS A AND D
1 CAPSULE ORAL
COMMUNITY
Start: 2019-06-27 | End: 2022-01-01

## 2020-02-07 RX ORDER — PANTOPRAZOLE SODIUM 20 MG/1
20 TABLET, DELAYED RELEASE ORAL
COMMUNITY
Start: 2016-08-30 | End: 2022-01-01

## 2020-02-07 RX ORDER — MICONAZOLE NITRATE 20 MG/G
CREAM TOPICAL
COMMUNITY
Start: 2019-10-01 | End: 2022-01-01

## 2020-02-07 RX ORDER — RANOLAZINE 1000 MG/1
1000 TABLET, EXTENDED RELEASE ORAL
COMMUNITY
Start: 2016-06-09 | End: 2022-01-01

## 2020-02-07 ASSESSMENT — MIFFLIN-ST. JEOR: SCORE: 1664.82

## 2020-02-07 NOTE — PROGRESS NOTES
"Subjective     Braeden Umana is a 69 year old male who presents to clinic today for the following health issues:    HPI     Ear blockage      Duration: on going    Description (location/character/radiation): ear wax build up of both ears    Intensity:  mild    Accompanying signs and symptoms: none    History (similar episodes/previous evaluation): happens every so often    Therapies tried and outcome: ear drops     Reviewed and updated as needed this visit by Provider  Tobacco  Allergies  Meds  Problems  Med Hx  Surg Hx  Fam Hx         Review of Systems   ROS COMP: Constitutional, HEENT, cardiovascular, pulmonary, gi and gu systems are negative, except as otherwise noted.      Objective    BP (P) 132/82   Pulse (P) 81   Temp (P) 98.1  F (36.7  C) (Oral)   Ht (P) 1.727 m (5' 8\")   Wt (P) 93.5 kg (206 lb 3.2 oz)   SpO2 (P) 95%   BMI (P) 31.35 kg/m    Body mass index is 31.35 kg/m  (pended).  Physical Exam   GENERAL: healthy, alert and no distress  EYES: Eyes grossly normal to inspection, PERRL and conjunctivae and sclerae normal  HENT: bilateral cerumen impaction.  nose and mouth without ulcers or lesions  NECK: no adenopathy, no asymmetry, masses, or scars and thyroid normal to palpation  RESP: lungs clear to auscultation - no rales, rhonchi or wheezes  CV: regular rate and rhythm, normal S1 S2, no S3 or S4, no murmur, click or rub, no peripheral edema and peripheral pulses strong  MS: no gross musculoskeletal defects noted, no edema  SKIN: no suspicious lesions or rashes          Assessment & Plan       ICD-10-CM    1. Bilateral impacted cerumen H61.23 REMOVE IMPACTED CERUMEN   2. Excess ear wax H61.20       irrigation attempted.    Unable to clear cerumen.    Recommend debrox to both ears twice a day and f/u in the clinic in 1 week if unable to clear the buildup.        No follow-ups on file.    JEREMY Ventura CNP  [facility]        "

## 2020-02-07 NOTE — NURSING NOTE
Patient identified using two patient identifiers.  Ear exam showing wax occlusion completed by RN.  Solution: warm water and hydrogenperoxide was placed in the bilateral ear(s) via irrigation tool: [employer] ear.

## 2020-03-07 ENCOUNTER — TRANSFERRED RECORDS (OUTPATIENT)
Dept: HEALTH INFORMATION MANAGEMENT | Facility: CLINIC | Age: 70
End: 2020-03-07

## 2020-03-07 LAB
ALT SERPL-CCNC: 27 IU/L (ref 8–45)
AST SERPL-CCNC: 42 IU/L (ref 2–40)
CREAT SERPL-MCNC: 1.17 MG/DL (ref 0.72–1.25)
GFR SERPL CREATININE-BSD FRML MDRD: >60 ML/MIN/1.73M2
GLUCOSE SERPL-MCNC: 147 MG/DL (ref 65–100)
INR PPP: 1
POTASSIUM SERPL-SCNC: 4.4 MMOL/L (ref 3.5–5)

## 2020-03-08 LAB — EJECTION FRACTION: 67 %

## 2020-03-10 ENCOUNTER — TRANSFERRED RECORDS (OUTPATIENT)
Dept: HEALTH INFORMATION MANAGEMENT | Facility: CLINIC | Age: 70
End: 2020-03-10

## 2020-03-13 LAB — EJECTION FRACTION: NORMAL %

## 2020-03-17 ENCOUNTER — TELEPHONE (OUTPATIENT)
Dept: FAMILY MEDICINE | Facility: CLINIC | Age: 70
End: 2020-03-17

## 2020-03-17 NOTE — TELEPHONE ENCOUNTER
Patient's call transferred to writer via emergency line.    Per patient:  1. Recent hospitalization with two stent placements  2. Large hematoma developed at groin site and one part broke through surface and started bleeding yesterday   A. Cardiology team instructed him to lie flat, apply pressure for 20 min and continue to lie flat for 1-2 hours   B. Bleeding resolved  3. Got out of shower a few minutes ago and same site began bleeding again, minimal, slow bleeding   A. Currently lying flat and applying pressure but was supposed to have hospital follow up appt with PCP today.    Informed patient to continue lying flat/follow cardiology team instructions; do not drive into clinic and writer will ask provider about rescheduling appt.    Patient verbalized understanding and in agreement with plan.    Per JUAN MANUEL Saleem CNP, patient can be seen tomorrow, 3/18/20 at 0830.    Patient informed and patient agreeable to change.  Appt rescheduled from 3/17/20 to 3/18/20.  Appt date, time and location confirmed with patient.    MICHELLE GuthrieN, RN

## 2020-03-17 NOTE — TELEPHONE ENCOUNTER
Reason for call:  Patient reporting a symptom    Symptom or request: bleeding right, from groin    Duration (how long have symptoms been present): just happened    Have you been treated for this before? No    Additional comments: none    Phone Number patient can be reached at:  Home number on file [phone] (home)    Best Time:  asap    Can we leave a detailed message on this number:  YES    Call taken on 3/17/2020 at 12:28 PM by Ruthy Mcmanus

## 2020-03-18 ENCOUNTER — OFFICE VISIT (OUTPATIENT)
Dept: FAMILY MEDICINE | Facility: CLINIC | Age: 70
End: 2020-03-18
Payer: MEDICARE

## 2020-03-18 VITALS
HEART RATE: 83 BPM | SYSTOLIC BLOOD PRESSURE: 123 MMHG | DIASTOLIC BLOOD PRESSURE: 79 MMHG | TEMPERATURE: 97.6 F | OXYGEN SATURATION: 98 % | RESPIRATION RATE: 16 BRPM | BODY MASS INDEX: 32.27 KG/M2 | WEIGHT: 203 LBS

## 2020-03-18 DIAGNOSIS — I21.4 NSTEMI (NON-ST ELEVATED MYOCARDIAL INFARCTION) (H): Primary | ICD-10-CM

## 2020-03-18 DIAGNOSIS — E78.5 HYPERLIPIDEMIA LDL GOAL <70: ICD-10-CM

## 2020-03-18 DIAGNOSIS — I25.10 ATHEROSCLEROSIS OF CORONARY ARTERY OF NATIVE HEART, ANGINA PRESENCE UNSPECIFIED, UNSPECIFIED VESSEL OR LESION TYPE: ICD-10-CM

## 2020-03-18 DIAGNOSIS — I10 HYPERTENSION GOAL BP (BLOOD PRESSURE) < 140/90: ICD-10-CM

## 2020-03-18 LAB
ANION GAP SERPL CALCULATED.3IONS-SCNC: 5 MMOL/L (ref 3–14)
BUN SERPL-MCNC: 18 MG/DL (ref 7–30)
CALCIUM SERPL-MCNC: 9.5 MG/DL (ref 8.5–10.1)
CHLORIDE SERPL-SCNC: 102 MMOL/L (ref 94–109)
CO2 SERPL-SCNC: 27 MMOL/L (ref 20–32)
CREAT SERPL-MCNC: 1.18 MG/DL (ref 0.66–1.25)
GFR SERPL CREATININE-BSD FRML MDRD: 61 ML/MIN/{1.73_M2}
GLUCOSE SERPL-MCNC: 110 MG/DL (ref 70–99)
POTASSIUM SERPL-SCNC: 4.6 MMOL/L (ref 3.4–5.3)
SODIUM SERPL-SCNC: 134 MMOL/L (ref 133–144)

## 2020-03-18 PROCEDURE — 36415 COLL VENOUS BLD VENIPUNCTURE: CPT | Performed by: NURSE PRACTITIONER

## 2020-03-18 PROCEDURE — 99214 OFFICE O/P EST MOD 30 MIN: CPT | Performed by: NURSE PRACTITIONER

## 2020-03-18 PROCEDURE — 80048 BASIC METABOLIC PNL TOTAL CA: CPT | Performed by: NURSE PRACTITIONER

## 2020-03-18 RX ORDER — IVABRADINE 5 MG/1
5 TABLET, FILM COATED ORAL 2 TIMES DAILY WITH MEALS
Qty: 60 TABLET | Refills: 5 | Status: SHIPPED | OUTPATIENT
Start: 2020-03-18 | End: 2021-01-21

## 2020-03-18 RX ORDER — VERAPAMIL HYDROCHLORIDE 120 MG/1
120 TABLET, FILM COATED, EXTENDED RELEASE ORAL AT BEDTIME
Qty: 30 TABLET | Refills: 5 | Status: SHIPPED | OUTPATIENT
Start: 2020-03-18 | End: 2021-01-21

## 2020-03-18 RX ORDER — ATORVASTATIN CALCIUM 10 MG/1
5 TABLET, FILM COATED ORAL DAILY
Qty: 15 TABLET | Refills: 5 | Status: SHIPPED | OUTPATIENT
Start: 2020-03-18 | End: 2021-01-21

## 2020-03-18 RX ORDER — CANDESARTAN 4 MG/1
4 TABLET ORAL 2 TIMES DAILY
Qty: 60 TABLET | Refills: 5 | Status: SHIPPED | OUTPATIENT
Start: 2020-03-18 | End: 2021-01-21

## 2020-03-18 NOTE — PROGRESS NOTES
Subjective     Braeden Umana is a 70 year old male who presents to clinic today for the following health issues:     HPI       Hospital Follow-up Visit:    Hospital/Nursing Home/IP Rehab Facility: [facility]  Reason(s) for Admission: heart attack             Problems taking medications regularly:  None       Medication changes since discharge: yes        Problems adhering to non-medication therapy:  None    Summary of hospitalization:  CareEverywhere information obtained and reviewed  [facility]   ANW HOSPITALIST SERVICE  DISCHARGE SUMMARY    Date of service: 3/15/2020   _____________________________________________      PATIENT NAME: Braeden Umana   AGE/SEX: 70 y.o. male   : 1950   MRN: [id number]   -----------------------------------  PRIMARY CARE PROVIDER: Treva Saleem NP   PCP PHONE: [phone]  -----------------------------------  ADMISSION DATE: 3/7/2020  DISCHARGE DATE: 3/15/2020     Dear Treva Saleem NP    It was a pleasure taking care of Braeden Umana during this hospitalization. He will be discharged from [facility] to home.     PRINCIPAL DIAGNOSIS CAUSING ADMISSION     NSTEMI    PATIENT'S ACTIVE HOSPITAL PROBLEM LIST   Principal Problem:  Chest pain of uncertain etiology  Active Problems:  ASCVD (arteriosclerotic cardiovascular disease)  DEPRESSION/ANXIETY  BPH (benign prostatic hypertrophy)  Obstructive Sleep Apnea  Mixed hyperlipidemia (Initial LDL - 143, LDL goal < 70)  Hypertension  Jaw pain: Anginal equivalent  Statin intolerance - begun on PCSK9 inhibitor 2018  Peripheral arterial disease (HC)  NSTEMI (non-ST elevated myocardial infarction) (HC)  Pseudoaneurysm following procedure (HC)  ADDITIONAL COMMENTS REGARDING DIAGNOSIS SPECIFICITY  Additional Diagnosis Information         BMI>30, which is consistent with OBESITY. This is clinically significant due to increased nursing cares, use of resources and specialty equipment.      "Lowest Total GCS this admission: 15            BRIEF HOSPITAL COURSE     Braeden \"Marciano\" Lyndsay is a 70 y.o. male with a history of extensive coronary artery disease (status post myocardial infarction, status post multiple percutaneous coronary interventions with patient reporting having had 19 coronary stents in total placed), peripheral vascular disease (s/p carotid endarterectomy 1/2018; leg claudication), hypertension, hyperlipidemia, hypercholesterolemia, obstructive sleep apnea. He was admitted on 3/7/2020 after presenting for chest pain and bilateral jaw pain at rest: unstable angina.     Patient with chronic angina which typically manifest as bilateral jaw pain with exertion. His most recent coronary angiogram was on 12/24/2018 which noted diffuse coronary artery disease but no obstructive CAD and no stent was placed and patient advised to continue medical treatment.  Most recent echo (12/23/2018) showed EF of 71% with grade 1 diastolic dysfunction but no comment made on wall motion abnormalities.  Patient has been following routinely with his cardiologist Dr. Fabrice Sandoval.     In ED, patient continued to have bilateral jaw pain and chest pain. EKG without definite signs of ischemia.  Patient hypertensive on presentation but blood pressure normalized.  Labs including troponin and BNP were unremarkable. CXR showed slightly increased interstitial markings throughout the lungs.      Echo on 3/8 showed mild inferoseptal hypokinesis with EF 67%. Underwent coronary angiogram with JUANPABLO in mid circumflex on 3/9 after angio showed 95 % stenosis, post stenosis of 0%.     During the night of 3/9 - 3/10, a rapid response was called for chest pain with radiation into the left side of his jaw. Troponin elevated at 3.5, but EKG without ischemic changes. Troponin rechecked and elevated at 6.558. He returned to cath lab and underwent a successful angiogram with JUANPABLO placement in proximal circumflex after it was found to have 90% " stenosis. Impression with suspected NSTEMI due to acute occlusion of dLAD. He had a noted bruit on right groin site following PCI and underwent thrombin injection for pseudoaneurysm repair.      A rapid response was called the evening of 3/11 when the patient experienced another episode of chest pain with radiation into his jaw with new right sided chest pain. 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 He was started on verapamil and ivabridine in an attempt to reduce/stop angina. By 3/15 he was symptom-free on exertion.     Patient will follow up with his PCP in 3-5 days for anticoagulation management, vascular access site checks, BMP, and angina control and with Dr. Sandoval, cardiology.    COMPLICATIONS DURING HOSPITALIZATION     None     CONSULTATIONS REQUESTED DURING THIS ADMISSION     Cardiology - Ramila De La Fuente MD    PERTINENT IMAGING RESULTS     Echo limited with contrast 3/13/20  Limited Echocardiogram performed   1. Normal LV size, normal global systolic function with an estimated EF of 65 - 70%.   2. Apical septum segment and apical inferior segment are abnormal.   3. Small apical aneurysm is noted.   4. Echo contrast was administrered to rule out left ventricular mass.     US arterial lower extremity right 3/12/2020   The right groin pseudoaneurysm remains thrombosed.        US INJ thrombin pseudoaneurysm right 3/11/20   Successful thrombin injection into a right common femoral pseudoaneurysm with ultrasound guidance.     US guide compression pseudoaneurysm right3/11/20  1. Small pseudoaneurysm at identified arising from the right common femoral artery.  2. Ultrasound-guided compression was performed by the sonographer for 20 minutes. After compression, the pseudoaneurysm remained patent.   3. Ultrasound-guided thrombin injection was subsequently  performed. This is dictated separately.     Echo complete without contrast 3/8/2020    1. Normal left ventricular size, borderline wall thickness, normal global systolic function, calculated EF of 67 %. Mild inferoseptal hypokinesis.   2. Right ventricular cavity size is normal, global systolic RV function is normal.   3. The aortic valve is trileaflet and sclerotic, no stenosis and no regurgitation.   4. No pericardial effusion.     XR Chest (3/7/2020)  No sign of acute disease. Slightly increased interstitial markings throughout the lungs may reflect chronic lung disease.     EKG (3/7/2020)  Normal sinus rhythm. Possible left atrial enlargement    PROCEDURES PERFORMED DURING THIS ADMISSION     Coronary angiogram 3/9/20  DIAGNOSTIC SUMMARY  - The Left main segment is extremely short and appears patent with stents distally from LAD  - There is a 50% ostial stent segment that is not stented. The remainder of CX is stented to distal segment. There is a 95% stenosis in the Mid Circumflex (in-stent) after takeoff of large first OM stent that is widely patent and stented in its ostial and proximal segments. The mid-CX and distal OMs including PLB are patent with mild disease.  - The LAD is stented from ostium to distal-mid segment. There is a 30% proximal stenosis.The apical LAD has a 99% stenosis (seen previously)  - The RCA is small and co-dominant. It has patent stents in ostium and proximal segment  - The 8Fr angioseal failed so the 8Fr sheath was placed back in vessel.  INTERVENTION  3mm x 8mm Balloon, 3.5mm x 8mm Balloon, and 3.5mm x 12mm Drug Eluting Stent to 95% Mid Circumflex, post stenosis 0%    Coronary angiogram 3/10/20  DIAGNOSTIC SUMMARY  - The LMCA has patent stent(s) from a previous procedure.  - The LAD has patent stent(s) from a previous procedure.  - 100% stenosis in the Distal LAD: new from 3/9/20  - 90% stenosis in the Proximal Circumflex (Restenosis - Drug Eluting Stent). DIstal stents are patent  -  "The RCA was not examined and is known to have patent stent(s) from a previous procedure from angiography 3/9/20.  INTERVENTION  - Successful 4mm x 12mm Balloon, 4mm x 10mm Balloon, and 4mm x 12mm Drug Eluting Stent to Proximal Circumflex, post stenosis 0%    PERTINENT FINDINGS / LABS AT DISCHARGE     /61 (Cuff Size: Adult Regular)  Pulse 66  Temp 98  F (36.7  C)  Resp 16  Ht 1.676 m (5' 6\")  Wt 92.6 kg (204 lb 2.3 oz)  SpO2 95%  BMI 32.95 kg/m      GEN: no acute distress   EYES: perrl; eomi; anicteric conjunctiva  NECK: supple  RESP: clear bilaterally  CV: rrr, no m/r/g, no lower extremity edema   ABD: non-distended, bowel sounds present, non-tender   SKIN: no bleeding, bruising or rashes   NEURO: alert and fully oriented, grossly non-focal   PSYCH: calm, cooperative     Recent Labs   03/13/20  1249 03/13/20  1011 03/11/20  0702   SODIUM -- 135 < > 137   POTASSIUM 4.7 -- < > 4.5   CHLORIDE -- 102 -- 107   IB8BRVLW -- 26 -- 25   ANIONGAP -- 7 -- 5   BUN 22 21 -- 22   CREATININE 1.12 1.19 < > 1.13   GLUCOSE -- 135 H -- 145 H   CALCIUM -- 9.0 -- 8.5   MAGNESIUM -- -- -- 2.1   GFRAFRICAN >60 >60 < > >60   GFRNOTAFRICA >60 60 L < > >60   < > = values in this interval not displayed.     Recent Labs   03/15/20  0706 03/14/20  0327 03/13/20  1011 03/12/20  1421   WBC -- -- -- 13.8 H 13.2 H   RBC -- -- -- 3.44 L 3.04 L   HGB 9.3 L 9.5 L < > 10.1 L 9.1 L   HCT 28.7 L 28.7 L < > 31.7 L 27.9 L   MCV 92 91 < > 92 92   MCH -- -- -- 29.4 29.9   MCHC -- -- -- 31.9 L 32.6    292 < > 311 247   MPV 10.7 10.7 < > 11.2 H 10.8   < > = values in this interval not displayed.     Recent Labs   03/13/20  1249 03/07/20 2125   INR 1.0 1.0     Recent Labs   03/10/20  1335 03/10/20  0947 03/10/20  0442 03/07/20 2128   TROPONINI 6.100 H 6.558 H 3.539 H --   TROPI -- -- -- Negative     Recent Labs   03/07/20 2125   BNP 17     Recent Labs   03/07/20 2125   ALBUMIN 3.4   BILITOTAL 0.3   BILIDIRECT 0.1   ALT 27   AST 42 H "   PROTEIN 6.8   ALKPHOSPH 103       IMPORTANT PENDING TEST RESULTS         These Lab Items may not have been resulted by the time the patient was discharged:   (From admission through now)         None       DISCHARGE MEDICATIONS / ORDERS       Your Home Medicines     START taking these medicines   Instructions   atorvastatin 10 mg tablet  For diagnoses: NSTEMI (non-ST elevated myocardial infarction) (HC)  Commonly known as: LIPITOR  Take 0.5 tablets by mouth at bedtime.    candesartan 4 mg tablet  For diagnoses: CKD (chronic kidney disease) stage 2, GFR 60-89 ml/min, NSTEMI (non-ST elevated myocardial infarction) (HC)  Commonly known as: ATACAND  Take 1 tablet by mouth 2 times daily.    ivabradine 5 mg tablet  For diagnoses: NSTEMI (non-ST elevated myocardial infarction) (HC)  Commonly known as: CORLANOR  Take 1 tablet by mouth 2 times daily with meals. Indications: chronic heart failure    verapamiL 120 mg Controlled-Release capsule  For diagnoses: ASCVD (arteriosclerotic cardiovascular disease), Cardiovascular symptoms, NSTEMI (non-ST elevated myocardial infarction) (HC)  Commonly known as: VERELAN  Take 1 capsule by mouth every morning.      CONTINUE taking these medicines   Instructions   ALPRAZolam 0.5 mg tablet  Commonly known as: XANAX  Take 1 mg by mouth at bedtime.    aspirin enteric coated 325 mg tablet  Commonly known as: ECOTRIN  Take 325 mg by mouth once daily with a meal.    clopidogreL 75 mg tablet  For diagnoses: ASCVD (arteriosclerotic cardiovascular disease)  Commonly known as: PLAVIX  Take 1 tablet by mouth every morning.    Cymbalta 60 mg Delayed-release capsule  Generic drug: DULoxetine  Take 60 mg by mouth once daily.    ezetimibe 10 mg tablet  For diagnoses: ASCVD (arteriosclerotic cardiovascular disease)  Commonly known as: Zetia  Take 1 tablet by mouth once daily. For cholesterol lowering.    gabapentin 300 mg capsule  Commonly known as: NEURONTIN  Take 600 mg by mouth at bedtime.    *  ketoconazole 2% shampoo 2 % shampoo  Commonly known as: NIZORAL  Lather on damp scalp, leave on for 5min, then rinse with water twice weekly    * ketoconazole 2% topical cream  Commonly known as: NIZORAL  Apply topically to affected area(s) twice daily as needed    medication order composer  Vitamin d3+k2  D3 125mcg  K2 100mcg    Milk of Magnesia 400 mg/5 mL suspension  Generic drug: milk of magnesia  Take 15-30 mL by mouth once daily if needed.    nitroglycerin 0.4 mg sublingual tablet  For diagnoses: ASCVD (arteriosclerotic cardiovascular disease)  Commonly known as: NITROSTAT  Place 1 tablet under the tongue every 5 minutes if needed for Other (Specify) (To Help Dilate Cardiac Arteries).    ProAir RespiClick 90 mcg/actuation INHALER  Generic drug: albuterol  Inhale 2 Puffs by mouth every 4 hours if needed.    Vitamin B-12 5,000 mcg Subl  Generic drug: cyanocobalamin (vitamin B-12)  Place 1 Tab under the tongue once daily.    Vitamin B-6 50 mg Cap  Generic drug: pyridoxine (vitamin B6)  Take 1 Cap by mouth once daily before a meal.    Vitamin D-3 2,000 unit capsule  Generic drug: cholecalciferol  Take 1 capsule by mouth once daily.    Vitamins A & D Cap  Take 1 capsule by mouth once daily before a meal. A- 3000 mcg  D- 10mcg      * This list has 2 medication(s) that are the same as other medications prescribed for you. Read the directions carefully, and ask your doctor or other care provider to review them with you.         STOP taking these medicines   losartan 50 mg tablet  Commonly known as: COZAAR       Diagnostic Tests/Treatments reviewed.  Follow up needed: bmp  Other Healthcare Providers Involved in Patient s Care:         Specialist appointment - cardiology 4/28  Update since discharge: stable.   He did have some oozing from right groin access site yesterday and again today (noticed after undressing).  He has been feeling fatigued.      Post Discharge Medication Reconciliation: discharge medications  reconciled and changed, per note/orders (see AVS).  Plan of care communicated with patient     Coding guidelines for this visit:  Type of Medical   Decision Making Face-to-Face Visit       within 7 Days of discharge Face-to-Face Visit        within 14 days of discharge   Moderate Complexity [id number]   High Complexity [id number]            He has had some mild angina, relieved by resting and deep breathing.  He has not needed to use his nitroglycerin.         Reviewed and updated as needed this visit by Provider         Review of Systems   ROS COMP: CONSTITUTIONAL: NEGATIVE for fever, chills, change in weight  ENT/MOUTH: NEGATIVE for ear, mouth and throat problems  RESP: NEGATIVE for significant cough or SOB  CV: see HPI  GI: NEGATIVE for nausea, abdominal pain, heartburn, or change in bowel habits  MUSCULOSKELETAL: NEGATIVE for significant arthralgias or myalgia  NEURO: NEGATIVE for weakness, dizziness or paresthesias  HEME/ALLERGY/IMMUNE: see HPI  PSYCHIATRIC: NEGATIVE for changes in mood or affect      Objective    /79   Pulse 83   Temp 97.6  F (36.4  C) (Oral)   Resp 16   Wt 92.1 kg (203 lb)   SpO2 98%   BMI (P) 30.87 kg/m    Body mass index is 30.87 kg/m  (pended).  Physical Exam   GENERAL: healthy, alert and no distress  NECK: no adenopathy, no asymmetry, masses, or scars and thyroid normal to palpation  RESP: lungs clear to auscultation - no rales, rhonchi or wheezes  CV: regular rate and rhythm, normal S1 S2, no S3 or S4, no murmur, click or rub, no peripheral edema and peripheral pulses strong  ABDOMEN: soft, nontender, no hepatosplenomegaly, no masses and bowel sounds normal  MS: no gross musculoskeletal defects noted, no edema  SKIN: bilateral inguinal ecchymoses; right inguinal hematoma, bleeding has ceased, no erythema or drainage; IV access site left antecubital fossa with 1 cm of erythema  PSYCH: mentation appears normal, affect normal/bright            Assessment & Plan     (I21.4)  NSTEMI (non-ST elevated myocardial infarction) (H)  (primary encounter diagnosis)  Comment: stable angina  Plan: verapamil ER (CALAN-SR) 120 MG CR tablet,         ivabradine (CORLANOR) 5 MG tablet, candesartan         (ATACAND) 4 MG tablet, atorvastatin (LIPITOR)         10 MG tablet, Basic metabolic panel  (Ca, Cl,         CO2, Creat, Gluc, K, Na, BUN)        Recheck bmp.  Continue with current medications.  Discussed activity restrictions.    Follow up with cardiology as scheduled.     (I25.10) Atherosclerosis of coronary artery of native heart, angina presence unspecified, unspecified vessel or lesion type  Comment:   Plan: verapamil ER (CALAN-SR) 120 MG CR tablet,         ivabradine (CORLANOR) 5 MG tablet, candesartan         (ATACAND) 4 MG tablet, atorvastatin (LIPITOR)         10 MG tablet, Basic metabolic panel  (Ca, Cl,         CO2, Creat, Gluc, K, Na, BUN)        See above.     (I10) Hypertension goal BP (blood pressure) < 140/90  Comment: at goal  Plan: verapamil ER (CALAN-SR) 120 MG CR tablet,         ivabradine (CORLANOR) 5 MG tablet, candesartan         (ATACAND) 4 MG tablet, Basic metabolic panel          (Ca, Cl, CO2, Creat, Gluc, K, Na, BUN)        The current medical regimen is effective;  continue present plan and medications.     (E78.5) Hyperlipidemia LDL goal <70  Comment:   Plan: atorvastatin (LIPITOR) 10 MG tablet, Basic         metabolic panel  (Ca, Cl, CO2, Creat, Gluc, K,         Na, BUN)                     No follow-ups on file.    Treva Saleem NP  [facility] [place]

## 2020-03-18 NOTE — PATIENT INSTRUCTIONS
Limit weight to no more than the equivalent of a gallon of milk for the first two week, then no more than 20# for another two weeks.    Gradually increase activity as tolerated.      Clean IV site with soapy water and apply bacitracin twice daily.  Let me know if redness increases, red streak develops.

## 2020-04-28 ENCOUNTER — TRANSFERRED RECORDS (OUTPATIENT)
Dept: HEALTH INFORMATION MANAGEMENT | Facility: CLINIC | Age: 70
End: 2020-04-28

## 2020-07-08 ENCOUNTER — ANCILLARY PROCEDURE (OUTPATIENT)
Dept: GENERAL RADIOLOGY | Facility: CLINIC | Age: 70
End: 2020-07-08
Attending: INTERNAL MEDICINE
Payer: MEDICARE

## 2020-07-08 ENCOUNTER — OFFICE VISIT (OUTPATIENT)
Dept: URGENT CARE | Facility: URGENT CARE | Age: 70
End: 2020-07-08
Payer: MEDICARE

## 2020-07-08 VITALS
WEIGHT: 195 LBS | DIASTOLIC BLOOD PRESSURE: 86 MMHG | TEMPERATURE: 99.1 F | BODY MASS INDEX: 31 KG/M2 | HEART RATE: 82 BPM | SYSTOLIC BLOOD PRESSURE: 158 MMHG | OXYGEN SATURATION: 96 %

## 2020-07-08 DIAGNOSIS — S99.921A FOOT INJURY, RIGHT, INITIAL ENCOUNTER: Primary | ICD-10-CM

## 2020-07-08 DIAGNOSIS — Z23 NEED FOR PROPHYLACTIC VACCINATION AGAINST DIPHTHERIA-TETANUS-PERTUSSIS (DTP): ICD-10-CM

## 2020-07-08 DIAGNOSIS — S99.921A FOOT INJURY, RIGHT, INITIAL ENCOUNTER: ICD-10-CM

## 2020-07-08 PROCEDURE — 90471 IMMUNIZATION ADMIN: CPT | Performed by: INTERNAL MEDICINE

## 2020-07-08 PROCEDURE — 73630 X-RAY EXAM OF FOOT: CPT | Mod: RT

## 2020-07-08 PROCEDURE — 99213 OFFICE O/P EST LOW 20 MIN: CPT | Mod: 25 | Performed by: INTERNAL MEDICINE

## 2020-07-08 PROCEDURE — 90715 TDAP VACCINE 7 YRS/> IM: CPT | Performed by: INTERNAL MEDICINE

## 2020-07-09 NOTE — PATIENT INSTRUCTIONS
No fracture noted.  Radiology review pending    Ice to areas of pain  Elevate to reduce swelling  Ace bandage for swelling over a sock as needed  Tylenol for pain      Skipped blood pressure medication today    Schedule nurse appointment for recheck if difficulty seeing primary provider      Patient Education     Self-Care for Strains and Sprains  Most minor strains and sprains can be treated with self-care. Recovering from a strain or sprain may take 6 to 8 weeks. Your self-care goal is to reduce pain and immobilize the injury to speed healing.     A sprain injures ligaments (tissue that connects bones to bones).      A strain injures muscles or tendons (tissue that connects muscles to bones).   Support the injured area  Wrapping the injured area provides support for short, necessary activities. Be careful not to wrap the area too tightly. This could cut off the blood supply.    Support a wrist, elbow, or shoulder with a sling.    Wrap an ankle or knee with an elastic bandage.    Tape a finger or toe to the one next to it.  Use cold and heat  Cold reduces swelling. Both cold and heat reduce pain. Heat should not be used in the initial treatment of the injury. When using cold or heat, always place a thin towel between the pack and your skin.    Apply ice or a cold pack 10 to 15 minutes every hour you re awake for the first 2 days.    After the swelling goes down, use cold or heat to control pain. Don t use heat late in the day, since it can cause swelling when you re not active.  Rest and elevate  Rest and elevation help your injury heal faster.    Raise the injured area above your heart level.    Keep the injured area from moving.    Limit the use of the joint or limb.  Use medicine    Aspirin reduces pain and swelling. (Note: Don t give aspirin to a child 18 or younger unless prescribed by the doctor.)    Non-steroidal anti-inflammatory medicines, such as ibuprofen, may reduce pain and swelling, as well. Ask  your healthcare provider for advice.  When to call your healthcare provider  Call your healthcare provider if:    The injured joint won t move, or bones make a grating sound when they move    You can t put weight on the injured area, even after 24 hours    The injured body part is cold, blue, tingling, or numb    The joint or limb appears bent or crooked.    Pain increases or doesn t improve in 4 days    When pressing along the injured area, you notice a spot that is especially painful  Date Last Reviewed: 5/1/2018 2000-2019 [employer]. [address] [place], [place] [id number]. All rights reserved. This information is not intended as a substitute for professional medical care. Always follow your healthcare professional's instructions.

## 2020-07-09 NOTE — PROGRESS NOTES
SUBJECTIVE:   Braeden Umana is a 70 year old male presenting with a chief complaint of   Chief Complaint   Patient presents with     Urgent Care     Foot Injury     ~ a week ago dropped heavy wooden object onto dorsum of right foot which did cut foot.  Notes some swelling and a fair amount of bruising on foot which appears to be growing larger.  Most recent tetanus documented was in August 2010.       He is an established patient of [facility].    MS Injury/Pain    Onset of symptoms was 1 week(s) ago.  Location: right foot  Context:       The injury happened while at home      Mechanism: dropped a heavy board on foot.  Wood worker      Patient experienced immediate pain, delayed swelling, had a cute    Current and Associated symptoms: Swelling, Bruising and Decreased range of motion big toe due to apin    Aggravating Factors: none  Therapies to improve symptoms include: none    Review of Systems    Past Medical History:   Diagnosis Date     Angioedema 8/10     Coronary atherosclerosis of unspecified type of vessel, native or graft      Depressive disorder, not elsewhere classified      Hypertrophy (benign) of prostate      Idiopathic urticaria      MI (myocardial infarction) (H) 1/15     Other anxiety states      Pure hypercholesterolemia      Unspecified sleep apnea      Family History   Problem Relation Age of Onset     C.A.D. Mother         bypass     Hypertension Mother      C.A.D. Father         late 50's     Hypertension Father      Neurologic Disorder Father         parkinsons     C.A.D. Brother         x2/CAD angioplasty age 54     Other - See Comments Brother         Possible Post Polio Syndrome     Allergies Brother      Diabetes No family hx of      Cerebrovascular Disease No family hx of      Cancer - colorectal No family hx of      Prostate Cancer No family hx of      Current Outpatient Medications   Medication Sig Dispense Refill     ALPRAZolam (XANAX) 0.25 MG tablet Take 0.25 mg by mouth 3 times  daily as needed.       aspirin 81 MG EC tablet Take 1 tablet (81 mg) by mouth daily 90 tablet 3     Cholecalciferol (VITAMIN D3) 50 MCG (2000 UT) CAPS Take 2,000 Units by mouth       clopidogrel (PLAVIX) 75 MG tablet Take 1 tablet (75 mg) by mouth daily 90 tablet PRN     Cyanocobalamin (VITAMIN B-12) 5000 MCG SUBL Place 1 tablet under the tongue       diphenhydrAMINE (BENADRYL) 25 MG tablet Take 25 mg by mouth every 6 hours as needed.       DULoxetine (CYMBALTA) 60 MG capsule Take 60 mg by mouth       ezetimibe (ZETIA) 10 MG tablet Take 1 tablet (10 mg) by mouth daily 90 tablet PRN     fluticasone (CUTIVATE) 0.05 % external cream Apply topically 2 times daily 60 g 0     gabapentin (NEURONTIN) 300 MG capsule Take 300 mg by mouth daily Take 3 tablets daily 90 capsule      ketoconazole (NIZORAL) 2 % external cream APPLY CREAM TOPICALLY TWICE DAILY TO AFFECTED AREA  99     ketoconazole (NIZORAL) 2 % external shampoo Use twice weekly 120 mL PRN     order for DME Equipment being ordered: CPAP REPLACEMENT MASK  FAX TO [facility] [phone] 1 Device 0     Vitamins A & D 5000-400 units CAPS Take 1 capsule by mouth       albuterol (PROAIR RESPICLICK) 108 (90 Base) MCG/ACT inhaler Inhale 2 puffs into the lungs every 4 hours as needed for shortness of breath / dyspnea or wheezing (Patient not taking: Reported on 7/8/2020) 1 Inhaler PRN     atorvastatin (LIPITOR) 10 MG tablet Take 0.5 tablets (5 mg) by mouth daily (Patient not taking: Reported on 7/8/2020) 15 tablet 5     candesartan (ATACAND) 4 MG tablet Take 1 tablet (4 mg) by mouth 2 times daily (Patient not taking: Reported on 7/8/2020) 60 tablet 5     diclofenac (VOLTAREN) 1 % GEL topical gel Apply 2 grams to left ankle four times daily as needed using enclosed dosing card. (Patient not taking: Reported on 7/8/2020) 100 g 0     FLUZONE HIGH-DOSE 0.5 ML injection PHARMACIST ADMINISTERED IMMUNIZATION ADMINISTERED AT TIME OF DISPENSING  0     ivabradine (CORLANOR) 5 MG  tablet Take 1 tablet (5 mg) by mouth 2 times daily (with meals) (Patient not taking: Reported on 2020) 60 tablet 5     miconazole (MICATIN) 2 % external cream        nitroGLYcerin (NITROSTAT) 0.4 MG sublingual tablet Place 1 tablet (0.4 mg) under the tongue every 5 minutes as needed for chest pain (Patient not taking: Reported on 2020) 25 tablet PRN     nystatin (MYCOSTATIN) 610432 UNIT/GM external powder Apply topically daily (Patient not taking: Reported on 2020) 60 g PRN     pantoprazole (PROTONIX) 20 MG EC tablet Take 20 mg by mouth       ranitidine (ZANTAC) 150 MG tablet Take 1 tablet (150 mg) by mouth 2 times daily (Patient not taking: Reported on 2020) 60 tablet PRN     Ranolazine 1000 MG TB12 Take 1,000 mg by mouth       STATIN NOT PRESCRIBED, INTENTIONAL, Please choose reason not prescribed, below       verapamil ER (CALAN-SR) 120 MG CR tablet Take 1 tablet (120 mg) by mouth At Bedtime (Patient not taking: Reported on 2020) 30 tablet 5     Social History     Tobacco Use     Smoking status: Former Smoker     Packs/day: 0.00     Last attempt to quit: 1972     Years since quittin.0     Smokeless tobacco: Never Used     Tobacco comment: x30 years+   Substance Use Topics     Alcohol use: No       OBJECTIVE  BP (!) 158/86 (BP Location: Left arm, Patient Position: Sitting, Cuff Size: Adult Regular)   Pulse 82   Temp 99.1  F (37.3  C) (Oral)   Wt 88.5 kg (195 lb)   SpO2 96%   BMI (P) 29.65 kg/m      Physical Exam  Vitals signs reviewed.   Constitutional:       Appearance: Normal appearance.   Musculoskeletal:      Comments: right lower extremity.    Ankle exam - normal   left foot  greatest pain middle of 1st metatarsal with healed linear wound,   Pain also at 1st MCT joint.  swelling dorsum foot,  bruising noted MCT 1-4th joints & medial arch area   Neurological:      Mental Status: He is alert.         Labs:  No results found for this or any previous visit (from the past 24  hour(s)).    X-Ray was done, my findings are: no fracture noted    ASSESSMENT:      ICD-10-CM    1. Foot injury, right, initial encounter  S99.921A XR Foot Right G/E 3 Views        Medical Decision Making:    Differential Diagnosis:  MS Injury Pain: fracture and contusion    PLAN:      Patient Instructions       No fracture noted.  Radiology review pending    Ice to areas of pain  Elevate to reduce swelling  Ace bandage for swelling over a sock as needed  Tylenol for pain      Skipped blood pressure medication today    Schedule nurse appointment for recheck if difficulty seeing primary provider      Patient Education     Self-Care for Strains and Sprains  Most minor strains and sprains can be treated with self-care. Recovering from a strain or sprain may take 6 to 8 weeks. Your self-care goal is to reduce pain and immobilize the injury to speed healing.     A sprain injures ligaments (tissue that connects bones to bones).      A strain injures muscles or tendons (tissue that connects muscles to bones).   Support the injured area  Wrapping the injured area provides support for short, necessary activities. Be careful not to wrap the area too tightly. This could cut off the blood supply.    Support a wrist, elbow, or shoulder with a sling.    Wrap an ankle or knee with an elastic bandage.    Tape a finger or toe to the one next to it.  Use cold and heat  Cold reduces swelling. Both cold and heat reduce pain. Heat should not be used in the initial treatment of the injury. When using cold or heat, always place a thin towel between the pack and your skin.    Apply ice or a cold pack 10 to 15 minutes every hour you re awake for the first 2 days.    After the swelling goes down, use cold or heat to control pain. Don t use heat late in the day, since it can cause swelling when you re not active.  Rest and elevate  Rest and elevation help your injury heal faster.    Raise the injured area above your heart level.    Keep the  injured area from moving.    Limit the use of the joint or limb.  Use medicine    Aspirin reduces pain and swelling. (Note: Don t give aspirin to a child 18 or younger unless prescribed by the doctor.)    Non-steroidal anti-inflammatory medicines, such as ibuprofen, may reduce pain and swelling, as well. Ask your healthcare provider for advice.  When to call your healthcare provider  Call your healthcare provider if:    The injured joint won t move, or bones make a grating sound when they move    You can t put weight on the injured area, even after 24 hours    The injured body part is cold, blue, tingling, or numb    The joint or limb appears bent or crooked.    Pain increases or doesn t improve in 4 days    When pressing along the injured area, you notice a spot that is especially painful  Date Last Reviewed: 5/1/2018 2000-2019 [employer]. [address] [place] [id number]. All rights reserved. This information is not intended as a substitute for professional medical care. Always follow your healthcare professional's instructions.

## 2020-10-02 ENCOUNTER — TRANSFERRED RECORDS (OUTPATIENT)
Dept: HEALTH INFORMATION MANAGEMENT | Facility: CLINIC | Age: 70
End: 2020-10-02

## 2020-10-02 LAB
ALT SERPL-CCNC: 22 IU/L (ref 8–45)
CHOLEST SERPL-MCNC: 197 MG/DL (ref 100–199)
HDLC SERPL-MCNC: 29 MG/DL
LDLC SERPL CALC-MCNC: 143 MG/DL
NONHDLC SERPL-MCNC: 168 MG/DL
TRIGL SERPL-MCNC: 123 MG/DL

## 2020-10-13 ENCOUNTER — TRANSFERRED RECORDS (OUTPATIENT)
Dept: HEALTH INFORMATION MANAGEMENT | Facility: CLINIC | Age: 70
End: 2020-10-13

## 2020-10-14 ENCOUNTER — MYC MEDICAL ADVICE (OUTPATIENT)
Dept: FAMILY MEDICINE | Facility: CLINIC | Age: 70
End: 2020-10-14

## 2020-10-14 NOTE — TELEPHONE ENCOUNTER
IN ED yesterday for BP issues and has to have f/u with PCP in about 2 weeks. Hold spot used for pt on 10/26 with PCP  Awaiting his confirmation for this appt time    Trudy Clarke RN, BSN

## 2020-10-16 ENCOUNTER — MYC MEDICAL ADVICE (OUTPATIENT)
Dept: FAMILY MEDICINE | Facility: CLINIC | Age: 70
End: 2020-10-16

## 2020-10-26 ENCOUNTER — OFFICE VISIT (OUTPATIENT)
Dept: FAMILY MEDICINE | Facility: CLINIC | Age: 70
End: 2020-10-26
Payer: MEDICARE

## 2020-10-26 VITALS
BODY MASS INDEX: 31.8 KG/M2 | WEIGHT: 200 LBS | RESPIRATION RATE: 18 BRPM | DIASTOLIC BLOOD PRESSURE: 87 MMHG | SYSTOLIC BLOOD PRESSURE: 138 MMHG | TEMPERATURE: 97.9 F | OXYGEN SATURATION: 98 % | HEART RATE: 96 BPM

## 2020-10-26 DIAGNOSIS — I25.10 ATHEROSCLEROSIS OF CORONARY ARTERY OF NATIVE HEART, ANGINA PRESENCE UNSPECIFIED, UNSPECIFIED VESSEL OR LESION TYPE: ICD-10-CM

## 2020-10-26 DIAGNOSIS — I10 HYPERTENSION GOAL BP (BLOOD PRESSURE) < 140/90: Primary | ICD-10-CM

## 2020-10-26 DIAGNOSIS — L30.9 DERMATITIS: ICD-10-CM

## 2020-10-26 DIAGNOSIS — Z23 NEED FOR PROPHYLACTIC VACCINATION AND INOCULATION AGAINST INFLUENZA: ICD-10-CM

## 2020-10-26 PROCEDURE — 90662 IIV NO PRSV INCREASED AG IM: CPT | Performed by: NURSE PRACTITIONER

## 2020-10-26 PROCEDURE — G0008 ADMIN INFLUENZA VIRUS VAC: HCPCS | Performed by: NURSE PRACTITIONER

## 2020-10-26 PROCEDURE — 99214 OFFICE O/P EST MOD 30 MIN: CPT | Mod: 25 | Performed by: NURSE PRACTITIONER

## 2020-10-26 RX ORDER — PRAVASTATIN SODIUM 40 MG
40 TABLET ORAL AT BEDTIME
COMMUNITY
Start: 2020-10-15 | End: 2022-01-01

## 2020-10-26 RX ORDER — HYDRALAZINE HYDROCHLORIDE 10 MG/1
30 TABLET, FILM COATED ORAL 3 TIMES DAILY
Qty: 270 TABLET | Refills: 2 | Status: SHIPPED | OUTPATIENT
Start: 2020-10-26 | End: 2021-02-22

## 2020-10-26 RX ORDER — HYDRALAZINE HYDROCHLORIDE 25 MG/1
25 TABLET, FILM COATED ORAL 3 TIMES DAILY
COMMUNITY
Start: 2020-10-13 | End: 2020-10-26

## 2020-10-26 RX ORDER — AMLODIPINE BESYLATE 2.5 MG/1
2.5 TABLET ORAL DAILY
Qty: 30 TABLET | Refills: 2 | Status: SHIPPED | OUTPATIENT
Start: 2020-10-26 | End: 2022-01-01

## 2020-10-26 RX ORDER — AMLODIPINE BESYLATE 10 MG/1
10 TABLET ORAL DAILY
COMMUNITY
Start: 2020-04-29 | End: 2020-10-26

## 2020-10-26 NOTE — PATIENT INSTRUCTIONS
Increase Hydralazine to 30 mg three times a day.  Amlodipine will now be a 2.5 mg tablet.  Go into the pharmacy with your blood pressure monitor to check blood pressure.  Let me know in two weeks how your numbers look.    Schedule an appointment to meet with Kevin in about two weeks.      Use the antifungal cream and steroid cream (1:1) mixed together.

## 2020-10-26 NOTE — PROGRESS NOTES
"Subjective     Braeden Umana is a 70 year old male who presents to clinic today for the following health issues:    HPI         ED/UC Followup:    Facility: [facility] Emergency Department   Date of visit: 10/13/2020  Reason for visit: hypertension   Current Status:   perineal area      CHIEF COMPLAINT     Chief Complaint   Patient presents with     High Blood Pressure     HISTORY OF PRESENT ILLNESS   Braeden Umana is a 70 y.o. male with a relevant past history of hypertension, hyperlipidemia, ASCVD, NSTEMI, s/p left carotid endarterectomy, and s/p coronary stent placement who presents to the ED for evaluation of high blood pressure.    History/Exam limitations: None.  Patient information was obtained from: patient.    For the last 2 days, patient has recorded high blood pressure at home. Patient called his primary clinic and was instructed by a nurse to take 10 mg of Amlodipine and go to the ER if there was no change in his blood pressure. Patient took the medication at 3 pm (3 hours ago) and noted that one hour later, his blood pressure did not lower. Of note, patient had stopped taking his blood pressure medication for a couple months due to side effects. When he was taking it, he would take it at night. Patient reports history of being on a beta blocker. He states \"I had difficulty functioning when I was taking that\".     I, Jake Clarke MD attest that Kylie M Bloch is acting in a scribe capacity, has observed my performance of the services and has documented them in accordance with my direction.    REVIEW OF SYSTEMS     Review of Systems   Constitutional: Negative for chills and fever.   Respiratory: Negative for shortness of breath.   Cardiovascular: Negative for chest pain, palpitations and leg swelling.   Gastrointestinal: Negative for abdominal pain.   Genitourinary: Negative for difficulty urinating.   Skin: Negative for rash.   Allergic/Immunologic: Negative for immunocompromised " state.   Neurological: Negative for dizziness and headaches.   Hematological: Does not bruise/bleed easily.   Psychiatric/Behavioral: Negative for agitation and confusion.   All other systems reviewed and are negative.    All other systems reviewed unless otherwise noted above  PAST MEDICAL HISTORY   I have reviewed the past medical history.   Patient Active Problem List   Diagnosis Date Noted     Hypertension 02/25/2013   Priority: High     Mixed hyperlipidemia (Initial LDL - 143, LDL goal < 70) 07/16/2012   Priority: High     DEPRESSION/ANXIETY   Priority: High     NSTEMI (non-ST elevated myocardial infarction) (HC) 03/13/2020     Pseudoaneurysm following procedure (HC) 03/13/2020   Right femoral PSA s/p coronary angiogram 3/9/20      Peripheral arterial disease (HC) 03/08/2020     Chest pain of uncertain etiology 03/07/2020     Statin intolerance - begun on PCSK9 inhibitor 9/2018 09/26/2018     History of left-sided carotid endarterectomy 01/25/2018     Heart palpitations 09/09/2016     Obesity 02/25/2013     Jaw pain: Anginal equivalent 02/25/2013     CKD (chronic kidney disease) stage 2, GFR 60-89 ml/min 02/21/2013     Obstructive Sleep Apnea 04/04/2011     Complicated migraine 04/04/2011     BPH (benign prostatic hypertrophy) 04/03/2011     Vision disturbance 04/03/2011     Hx of Angioedema 08/19/2010   Admitted twice. Has had allergy testing. Follows with allergist.      RIGHT CAROTID BRUIT SECONDARY TO EXTERNAL CAROTID ARTERY D'S   80% RT EXTERNAL CAROTID STENOSIS, 60% RT INTERNAL CAROTID  70% LT EXTERNAL STENOSIS; >40% LT INTERNAL CAROTID DTENOSIS      ASCVD (arteriosclerotic cardiovascular disease)   -Stenting LCx and OM 8/2003  -Stenting to proximal LCx and mid to distal LAD 8/2003  -Stenting to proximal and mid LAD 4/2004  -Complex PCI to LCX, AV groove and proximal RCA 1/2/2013  -Coronary CTA 1/7/2013  Patent ostial RCA stent  Patent ostial, mid and apical LAD stents  Patent mid RCA stent  - Stenting to  "left main and ostial LAD 02/2013  - 8/30/2013: s/p JUANPABLO pLAD  - 1/22/15: s/p JUANPABLO dLAD; s/p JUANPABLO OM1  - 4/30/2015: s/p JUANPABLO pRCA  - Complex PCI of 95% in-stent restenosis of mid LCx 3/9/20 for [facility]  -taken back to lab 3/10 for recurrent symptoms and NSTEMI  -new acute 100% distal embolization in LAD, likely happened during first procedure  -90% in stent restenosis of proximal LCx treated with balloon angioplasty and JUANPABLO x1 on 3/10      POSITIVE FAMILY OF PREMATURE EARLY CORONARY ARTERY DISEASE     Past Medical History:   . Date     Arteriosclerotic heart disease (ASHD)     Carotid artery stenosis     CKD (chronic kidney disease), stage II     Depression     Dyslipidemia     HTN (hypertension)     Hypertrophy of prostate     Idiopathic angioedema     Obesity     JONNATHAN on CPAP     Allergies:   Allergies   Allergen Reactions     Contrast Dye [Iodinated Contrast Media] Anaphylaxis     Lipitor [Atorvastatin] Muscle Weakness     Alirocumab Runny Nose     Apples [Apple (Fruit)] Angioedema     Chlorthalidone Dizziness     Codeine Stomach Upset     Coreg [Carvedilol] Dyspnea     Imdur [Isosorbide Mononitrate] Other - Describe In Comment Field   felt poor; \"I could barely function; I was so out of it.\"     Lisinopril Angioedema   Admit Aug '10 with angioedema, not certain that ACEi was the culprit. Re-admitted March '11 with angioedema, not thought to be from ACEi. Per allergist he does not have an ACEi allergy     Repatha Pushtronex [Evolocumab] Dizziness     Rosuvastatin Myalgia     Shrimp [Shellfish Containing Products] Angioedema   Shrimp (causes hives), salmon, apples and cashew cause angioedema     Strawberry Angioedema     Tree Nut Angioedema     Unknown [Unlisted Allergen (Include Detail In Comments)] Angioedema   CITRUS AND OTHER ACIDIC FOODS  Except oranges     Zocor [Simvastatin] Myalgia     PAST SURGICAL HISTORY     Past Surgical History:   . Laterality Date     CAROTID ENDARTERECTOMY Left 01/23/2018     CORONARY STENT " PLACEMENT   ~15 stents     HERNIA REPAIR   RIGHT INGUINAL     ORCHIOPEXY     RI UNLISTED PROCEDURE ACCESSORY SINUSES   Sinus Procedure     SOCIAL & FAMILY HISTORY     Social History     Socioeconomic History     Marital status:    Spouse name: Not on file     Number of children: 2     Years of education: Not on file     Highest education level: Not on file   Occupational History     Occupation: UNEMPLOYED   Comment: DISABILITY   Social Needs     Financial resource strain: Not on file     Food insecurity   Worry: Not on file   Inability: Not on file     Transportation needs   Medical: Not on file   Non-medical: Not on file   Tobacco Use     Smoking status: Former Smoker   Packs/day: 1.00   Years: 10.00   Pack years: 10.00   Types: Cigarettes   Quit date: 1970   Years since quittin.2     Smokeless tobacco: Never Used   Substance and Sexual Activity     Alcohol use: No     Drug use: No   Comment: Drug use: marijuana     Sexual activity: Yes   Partners: Female   Lifestyle     Physical activity   Days per week: Not on file   Minutes per session: Not on file     Stress: Not on file   Relationships     Social connections   Talks on phone: Not on file   Gets together: Not on file   Attends Sikhism service: Not on file   Active member of club or organization: Not on file   Attends meetings of clubs or organizations: Not on file   Relationship status: Not on file     Intimate partner violence   Fear of current or ex partner: Not on file   Emotionally abused: Not on file   Physically abused: Not on file   Forced sexual activity: Not on file   Other Topics Concern     Caffeine Concern Yes   Comment: 3 /day     Exercise Yes   Comment: 3-4 60 [facility]-care (aerobic)      Service No     Blood Transfusions No     Occupational Exposure No     Hobby Hazards No     Sleep Concern Yes   Comment: CPAP Use     Stress Concern Yes   Comment: Anxiety/Depression Chronic     Weight Concern Yes     Special  "Diet Yes   Comment: Cardiac     Back Care No     Bike Helmet No     Seat Belt Yes     Self-Exams Yes   Social History Narrative    with two children. As of 3/8/2020, patient works part-time as a personal caregiver. Retired from the [employer] industry where he worked for 30 years. Quit smoking in . Drinks alcohol very rarely.     Family History   Problem Relation Age of Onset     Heart Disease Brother   His brother just last year had an angioplasty and stent replacement at age 54. His father also had heart disease in his late 50's. His mother had bypass at age 62 and  from complications related to that.     Heart Disease Mother   MOTHER  OF COMPLICATIONS OF CABG AT AGE OF 65     Heart Disease Father   FATHER  AT 75 DUE TO HEART PROBLEM/PARKINSON       PHYSICAL EXAMINATION     Patient Vitals for the past 24 hrs:  BP Temp Pulse Resp SpO2 Height Weight   10/13/20 1900 (!) 197/107 -- -- 18 95 % -- --   10/13/20 1714 (!) 213/111 98.1  F (36.7  C) 86 16 95 % 1.676 m (5' 6\") 91.6 kg (202 lb)     Physical Exam   Constitutional: He is oriented to person, place, and time and well-developed, well-nourished, and in no distress.   Eyes: Conjunctivae and EOM are normal.   Neck: Normal range of motion. Neck supple.   Cardiovascular: Normal rate, regular rhythm, normal heart sounds and intact distal pulses.   Pulmonary/Chest: Effort normal and breath sounds normal.   Abdominal: Soft. Bowel sounds are normal.   Musculoskeletal: Normal range of motion.   Neurological: He is alert and oriented to person, place, and time.   Skin: Skin is warm and dry.   Psychiatric: Memory, affect and judgment normal.   Nursing note and vitals reviewed.    DIAGNOSTICS     IMAGING STUDIES:   Xr Chest 2 Views Pa And Lateral    Result Date: 10/13/2020  INDICATION: High blood pressure. COMPARISON: Chest radiograph 2020. TECHNIQUE: 2 view chest. FINDINGS: No focal consolidation, pleural effusion, or pneumothorax. Stable diffusely " increased interstitial markings bilaterally which may be due to edema or fibrosis. Less likely atypical infection given stability. Normal heart size and pulmonary vascularity. Aortic calcification. Degenerative changes of the spine. IMPRESSION: 1. Stable diffuse increased interstitial markings bilaterally which may be due to edema or fibrosis. Atypical infection considered less likely given stability. 2. No other acute cardiopulmonary findings. Dictated by Mony Harrington MD @ Oct 13 2020 6:18PM (Electronically Signed)    I have reviewed imaging studies above. Please see radiology dictation for complete report.    INTERVENTIONS     All Medication Administration through 10/13/2020 2042   None       PROCEDURES     EMERGENCY DEPARTMENT COURSE & MDM   5:58 PM I met with the patient to gather history and to perform my initial exam. I discussed treatment options and the plan for care while in the Emergency Department.   6:14 PM I spoke in depth with the pharmacist who gave me recommendations on medications for patient.   6:27 PM I rechecked patient and updated with imaging results. Discharge instructions as outlined below have been discussed with and agreed upon by the patient. We also discussed reasons to return to the ED and all questions were answered.    Covid PPE During Patient Encounter:  Mask: N95  Eye Protection: Face shield  Gown: No  Hair Cover: Yes    The differential diagnosis includes gradually elevating primary blood pressure but there is no secondary hypertension symptoms or findings and there are no findings of target organ effect. Of interest, reading the notes from the clinic they suggested that if he did not get blood pressure lowering within 1 hour of taking his medication that he should come to the emergency department and there is no clinical or literature indication for this method of treatment. Generally blood pressure needs to be lowered on a slow basis in a patient who has no target organ effect.  There are no neurologic, cardiac, pulmonary, or renal findings or symptoms that would suggest hypertensive crisis. Therefore, the actual number is not a problem and should not be chased. The patient will need to talk with the physician at the group about this rather than the nurse for any future concerns.    The patient is on a single agent and the dose is high enough so that we should not increase this, we did talk about the options for ongoing management with the previous side effects from medications and after working with the pharmacist we will add hydralazine to his regimen, he will avoid taking his blood pressure excessively with a maximum of 2 checks per day, logging his numbers no matter what they are, and following up in 2 weeks for an examination and a discussion with his clinic about ongoing management.    DIAGNOSIS     ICD-10-CM   1. Hypertension I10     DISPOSITION       ED Disposition   ED Disposition Comment   Discharge       Medications Prescribed this Visit   Disp Refills Start End   hydrALAZINE (APRESOLINE) 25 mg tablet 50 tablet 0 10/13/2020   Take 1 tablet by mouth 3 times daily.   Oral       Discharge Instructions     Continue to take your amlodipine at bedtime. Start taking the hydralazine tonight 1 tablet tonight and then 3 times a day regularly. You will need to make a follow-up appointment for recheck with your primary clinic in about 2 weeks. You can always return here if you get symptoms as we discussed.    Take your blood pressure once or twice a day and log the time and date and bring this with you to your physician's office.    Follow-up Information   Follow up With Specialties Details Why Contact Info   Treva Saleem, NP Schedule an appointment as soon as possible for a visit in 2 weeks [id number] [facility] [id number] [phone]       He has been taking only about 1/3 tablet of Pravastatin and Amlodipine.  He did try taking 1/2 tablet of both and had extreme fatigue and  myalgias the next day.    Blood pressure readings at home have continued to be elevated.           Review of Systems   CONSTITUTIONAL: NEGATIVE for fever, chills, change in weight  ENT/MOUTH: NEGATIVE for ear, mouth and throat problems  RESP: NEGATIVE for significant cough or SOB  CV: NEGATIVE for chest pain, palpitations or peripheral edema  GI: NEGATIVE for nausea, abdominal pain, heartburn, or change in bowel habits  MUSCULOSKELETAL: NEGATIVE for significant arthralgias or myalgia  NEURO: NEGATIVE for weakness, dizziness or paresthesias  PSYCHIATRIC: NEGATIVE for changes in mood or affect      Objective    /87   Pulse 96   Temp 97.9  F (36.6  C) (Oral)   Resp 18   Wt 90.7 kg (200 lb)   SpO2 98%   BMI (P) 30.41 kg/m    Body mass index is 30.41 kg/m  (pended).  Physical Exam   GENERAL: healthy, alert and no distress  RESP: lungs clear to auscultation - no rales, rhonchi or wheezes  CV: regular rate and rhythm, normal S1 S2, no S3 or S4, no murmur, click or rub, no peripheral edema and peripheral pulses strong  MS: no gross musculoskeletal defects noted, no edema  SKIN: erythema in inguinal folds  PSYCH: mentation appears normal, affect normal/bright            Assessment & Plan     Hypertension goal BP (blood pressure) < 140/90  Patient Instructions   Increase Hydralazine to 30 mg three times a day.  Amlodipine will now be a 2.5 mg tablet.  Go into the pharmacy with your blood pressure monitor to check blood pressure.  Let me know in two weeks how your numbers look.    Schedule an appointment to meet with Kevin in about two weeks.      Use the antifungal cream and steroid cream (1:1) mixed together.       - amLODIPine (NORVASC) 2.5 MG tablet; Take 1 tablet (2.5 mg) by mouth daily  - hydrALAZINE (APRESOLINE) 10 MG tablet; Take 3 tablets (30 mg) by mouth 3 times daily    Atherosclerosis of coronary artery of native heart, angina presence unspecified, unspecified vessel or lesion type  Follow up with  "cardiology.     Dermatitis  May try using antifungal cream and the fluticasone cream in equal parts to groin rash.     Need for prophylactic vaccination and inoculation against influenza    - FLUZONE HIGH DOSE 65+  [91864]  - Vaccine Administration, Initial [59389]     BMI:   Estimated body mass index is 30.41 kg/m  (pended) as calculated from the following:    Height as of 2/7/20: (P) 1.727 m (5' 8\").    Weight as of this encounter: 90.7 kg (200 lb).   Weight management plan: Discussed healthy diet and exercise guidelines             No follow-ups on file.    Treva Saleem NP  [facility]      "

## 2020-11-09 ENCOUNTER — TRANSFERRED RECORDS (OUTPATIENT)
Dept: HEALTH INFORMATION MANAGEMENT | Facility: CLINIC | Age: 70
End: 2020-11-09

## 2020-11-10 ENCOUNTER — TRANSFERRED RECORDS (OUTPATIENT)
Dept: HEALTH INFORMATION MANAGEMENT | Facility: CLINIC | Age: 70
End: 2020-11-10

## 2020-11-10 LAB
CREAT SERPL-MCNC: 1.25 MG/DL (ref 0.72–1.25)
GFR SERPL CREATININE-BSD FRML MDRD: 57 ML/MIN/1.73M2
GLUCOSE SERPL-MCNC: 107 MG/DL (ref 65–100)
POTASSIUM SERPL-SCNC: 4.8 MMOL/L (ref 3.5–5)

## 2021-01-15 ENCOUNTER — HEALTH MAINTENANCE LETTER (OUTPATIENT)
Age: 71
End: 2021-01-15

## 2021-01-21 ENCOUNTER — VIRTUAL VISIT (OUTPATIENT)
Dept: FAMILY MEDICINE | Facility: CLINIC | Age: 71
End: 2021-01-21
Payer: MEDICARE

## 2021-01-21 DIAGNOSIS — T78.2XXD ANAPHYLACTIC REACTION, SUBSEQUENT ENCOUNTER: Primary | ICD-10-CM

## 2021-01-21 DIAGNOSIS — I25.10 ATHEROSCLEROSIS OF CORONARY ARTERY OF NATIVE HEART, ANGINA PRESENCE UNSPECIFIED, UNSPECIFIED VESSEL OR LESION TYPE: ICD-10-CM

## 2021-01-21 PROCEDURE — 99214 OFFICE O/P EST MOD 30 MIN: CPT | Mod: 95 | Performed by: NURSE PRACTITIONER

## 2021-01-21 NOTE — PROGRESS NOTES
Marciano is a 70 year old who is being evaluated via a billable video visit.      How would you like to obtain your AVS? [email or web address]   If the video visit is dropped, the invitation should be resent by: Patient having trouble logging into [place] [facility]. If needed please Send to e-mail at: [email or web address]  Will anyone else be joining your video visit? No    Video Start Time: 1:02 PM  Assessment & Plan     Anaphylactic reaction, subsequent encounter  Although he does not have a specific contraindication to getting a COVID vaccine, he has a history of anaphylaxis to multiple medications and food and a history of heart disease with prior epinephrine-induced MI.  I would like him to have an allergy consult to determine if he should even get the vaccine and a plan for safe administration/monitoring.   - ALLERGY/ASTHMA ADULT REFERRAL    Atherosclerosis of coronary artery of native heart, angina presence unspecified, unspecified vessel or lesion type  Discussed his recent cardiology visit.   35 minutes spent on the date of the encounter doing chart review, patient visit and documentation                No follow-ups on file.    Treva Saleem NP  [facility]     Marciano is a 70 year old who presents to clinic today for the following health issues     HPI   Patient interested in receiving Covid-19 vaccination at a [facility]   concerned about allergies/side effects     He has had anaphylaxis to several medications in the past.  He did have an MI related to epinephrine use after a reaction to contrast dye.    He wants to discuss precautions that can be taken with getting a COVID vaccine.        Review of Systems         Objective           Vitals:  No vitals were obtained today due to virtual visit.    Physical Exam   GENERAL: Healthy, alert and no distress  EYES: Eyes grossly normal to inspection.  No discharge or erythema, or obvious scleral/conjunctival abnormalities.  RESP: No  audible wheeze, cough, or visible cyanosis.  No visible retractions or increased work of breathing.    SKIN: Visible skin clear. No significant rash, abnormal pigmentation or lesions.  NEURO: Cranial nerves grossly intact.  Mentation and speech appropriate for age.  PSYCH: Mentation appears normal, affect normal/bright, judgement and insight intact, normal speech and appearance well-groomed.                Video-Visit Details    Type of service:  Video Visit    Video End Time:1:26 PM    Originating Location (pt. Location): Home    Distant Location (provider location):  [facility]     Platform used for Video Visit: Connesta

## 2021-01-25 ENCOUNTER — TRANSFERRED RECORDS (OUTPATIENT)
Dept: HEALTH INFORMATION MANAGEMENT | Facility: CLINIC | Age: 71
End: 2021-01-25

## 2021-01-25 ENCOUNTER — MEDICAL CORRESPONDENCE (OUTPATIENT)
Dept: HEALTH INFORMATION MANAGEMENT | Facility: CLINIC | Age: 71
End: 2021-01-25

## 2021-02-15 DIAGNOSIS — I25.10 ATHEROSCLEROSIS OF CORONARY ARTERY OF NATIVE HEART, ANGINA PRESENCE UNSPECIFIED, UNSPECIFIED VESSEL OR LESION TYPE: ICD-10-CM

## 2021-02-19 NOTE — TELEPHONE ENCOUNTER
Medication Detail      Disp Refills Start End ANGELO   ezetimibe (ZETIA) 10 MG tablet 90 tablet 3 4/28/2016  No   Sig: Take 1 tablet (10 mg) by mouth daily       Last Office Visit with G[facility] P or [facility] prescribing provider:  5-22-17   Future Office Visit:      Cholesterol   Date Value Ref Range Status   12/15/2015 162 <200 mg/dL Final     HDL Cholesterol   Date Value Ref Range Status   12/15/2015 29 (L) >39 mg/dL Final     LDL Cholesterol Calculated   Date Value Ref Range Status   12/15/2015 98 <100 mg/dL Final     Comment:     Desirable:       <100 mg/dl     Triglycerides   Date Value Ref Range Status   12/15/2015 177 (H) <150 mg/dL Final     Comment:     Borderline high:  150-199 mg/dl   High:             200-499 mg/dl   Very high:       >499 mg/dl   Fasting specimen       Cholesterol/HDL Ratio   Date Value Ref Range Status   07/10/2003 6 (H) 0 - 5 Final     ALT   Date Value Ref Range Status   04/28/2015 25 8 - 45 U/L Final           Pt to ED with CC of chest pain. Pt states the pain started 2 hours ago while he was sleeping. +SOB. Pt denies cardiac Hx. Pt reports 12 beers per day, but denies drinking every day. Pt denies drug use.

## 2021-02-22 RX ORDER — CLOPIDOGREL BISULFATE 75 MG/1
TABLET ORAL
Qty: 90 TABLET | Refills: 0 | OUTPATIENT
Start: 2021-02-22

## 2021-05-19 ENCOUNTER — MYC MEDICAL ADVICE (OUTPATIENT)
Dept: FAMILY MEDICINE | Facility: CLINIC | Age: 71
End: 2021-05-19

## 2021-05-19 DIAGNOSIS — I25.10 ATHEROSCLEROSIS OF CORONARY ARTERY OF NATIVE HEART, ANGINA PRESENCE UNSPECIFIED, UNSPECIFIED VESSEL OR LESION TYPE: ICD-10-CM

## 2021-05-19 RX ORDER — CLOPIDOGREL BISULFATE 75 MG/1
75 TABLET ORAL DAILY
Qty: 90 TABLET | Refills: 1 | Status: SHIPPED | OUTPATIENT
Start: 2021-05-19 | End: 2021-12-03

## 2021-05-19 NOTE — TELEPHONE ENCOUNTER
Tina Saleem CNP  Pt had virtual with you 1/21  When do you want to see him for a follow up visit?    He would like plavix refills.    Thanks!     Kacy Tucker RN

## 2021-09-05 ENCOUNTER — HEALTH MAINTENANCE LETTER (OUTPATIENT)
Age: 71
End: 2021-09-05

## 2021-11-22 ENCOUNTER — E-VISIT (OUTPATIENT)
Dept: URGENT CARE | Facility: CLINIC | Age: 71
End: 2021-11-22
Payer: MEDICARE

## 2021-11-22 DIAGNOSIS — Z20.822 SUSPECTED COVID-19 VIRUS INFECTION: Primary | ICD-10-CM

## 2021-11-22 PROCEDURE — 99421 OL DIG E/M SVC 5-10 MIN: CPT | Performed by: PHYSICIAN ASSISTANT

## 2021-11-23 ENCOUNTER — LAB (OUTPATIENT)
Dept: FAMILY MEDICINE | Facility: CLINIC | Age: 71
End: 2021-11-23
Attending: PHYSICIAN ASSISTANT
Payer: MEDICARE

## 2021-11-23 DIAGNOSIS — Z20.822 SUSPECTED COVID-19 VIRUS INFECTION: ICD-10-CM

## 2021-11-23 PROCEDURE — U0003 INFECTIOUS AGENT DETECTION BY NUCLEIC ACID (DNA OR RNA); SEVERE ACUTE RESPIRATORY SYNDROME CORONAVIRUS 2 (SARS-COV-2) (CORONAVIRUS DISEASE [COVID-19]), AMPLIFIED PROBE TECHNIQUE, MAKING USE OF HIGH THROUGHPUT TECHNOLOGIES AS DESCRIBED BY CMS-2020-01-R: HCPCS

## 2021-11-23 PROCEDURE — U0005 INFEC AGEN DETEC AMPLI PROBE: HCPCS

## 2021-11-23 NOTE — PATIENT INSTRUCTIONS
Dear Braeden Umana,    Your symptoms show that you may have coronavirus (COVID-19). This illness can cause fever, cough and trouble breathing. Many people get a mild case and get better on their own. Some people can get very sick.    Will I be tested for COVID-19?  We would like to test you for Covid-19 virus. I have placed orders for this test.     To schedule: go to your [employer] home page and scroll down to the section that says  You have an appointment that needs to be scheduled  and click the large green button that says  Schedule Now  and follow the steps to find the next available openings.    If you are unable to complete these [employer] scheduling steps, please call [phone] to schedule your testing.     Return to work/school/ guidance:  Please let your workplace manager and staffing office know when your quarantine ends     We can t give you an exact date as it depends on the above. You can calculate this on your own or work with your manager/staffing office to calculate this. (For example if you were exposed on 10/4, you would have to quarantine for 14 full days. That would be through 10/18. You could return on 10/19.)      If you receive a positive COVID-19 test result, follow the guidance of the those who are giving you the results. Usually the return to work is 10 (or in some cases 20 days from symptom onset.) If you work at [facility], you must also be cleared by Employee Occupational Health and Safety to return to work.        If you receive a negative COVID-19 test result and did not have a high risk exposure to someone with a known positive COVID-19 test, you can return to work once you're free of fever for 24 hours without fever-reducing medication and your symptoms are improving or resolved.      If you receive a negative COVID-19 test and If you had a high risk exposure to someone who has tested positive for COVID-19 then you can return to work 14 days after your last  contact with the positive individual    Note: If you have ongoing exposure to the covid positive person, this quarantine period may be more than 14 days. (For example, if you are continued to be exposed to your child who tested positive and cannot isolate from them, then the quarantine of 7-14 days can't start until your child is no longer contagious. This is typically 10 days from onset of the child's symptoms. So the total duration may be 17-24 days in this case.)    Sign up for [employer].   We know it's scary to hear that you might have COVID-19. We want to track your symptoms to make sure you're okay over the next 2 weeks. Please look for an email from [employer]--this is a free, online program that we'll use to keep in touch. To sign up, follow the link in the email you will receive. Learn more at [email or web address]    How can I take care of myself?    Get lots of rest. Drink extra fluids (unless a doctor has told you not to)    Take Tylenol (acetaminophen) or ibuprofen for fever or pain. If you have liver or kidney problems, ask your family doctor if it's okay to take Tylenol o ibuprofen    If you have other health problems (like cancer, heart failure, an organ transplant or severe kidney disease): Call your specialty clinic if you don't feel better in the next 2 days.    Know when to call 911. Emergency warning signs include:  o Trouble breathing or shortness of breath  o Pain or pressure in the chest that doesn't go away  o Feeling confused like you haven't felt before, or not being able to wake up  o Bluish-colored lips or face    Where can I get more information?  [facility] - About COVID-19:   [email or web address]    CDC - What to Do If You're Sick:   www.cdc.gov/coronavirus/2019-ncov/about/steps-when-sick.html

## 2021-11-24 LAB — SARS-COV-2 RNA RESP QL NAA+PROBE: NEGATIVE

## 2021-12-01 DIAGNOSIS — I25.10 ATHEROSCLEROSIS OF CORONARY ARTERY OF NATIVE HEART: ICD-10-CM

## 2021-12-02 NOTE — TELEPHONE ENCOUNTER
Routing refill request to provider for review/approval because:  Labs not current:  Hgb, platelets     Hemoglobin   Date Value Ref Range Status   02/21/2019 14.8 13.3 - 17.7 g/dL Final     Platelet Count   Date Value Ref Range Status   02/21/2019 402 150 - 450 10e9/L Final         MICHELLE GarciaN RN  [facility]

## 2021-12-03 RX ORDER — CLOPIDOGREL BISULFATE 75 MG/1
TABLET ORAL
Qty: 90 TABLET | Refills: 0 | Status: SHIPPED | OUTPATIENT
Start: 2021-12-03 | End: 2022-01-01

## 2021-12-08 ENCOUNTER — NURSE TRIAGE (OUTPATIENT)
Dept: CARE COORDINATION | Facility: CLINIC | Age: 71
End: 2021-12-08
Payer: MEDICARE

## 2021-12-08 NOTE — TELEPHONE ENCOUNTER
Pt on COVID GetWell Loop with red flag alert for worsening dyspnea.  Call made to assess, no answer.  Left message with 24/7 nurse triage phone number, [phone] ([phone]) and informed RN will try back in about a half hour.  RN additionally noted pt called his heart clinic today regarding symptoms and their nurse called and left pt a message.  RN sent message to pt through GetWell Loop informing will try back shortly.        ADDENDUM at 10:24am:  RN sent pt a message through [employer] and noticed another Get[employer] RN sent pt a message at the same time as this RN, with message noting as pt is already working with cardiology team, RN would defer to their expertise at this time.  This RN sent pt a follow up message through [employer] informing of no further outreaches by RN as pt has received messages from 2 different RNs and he is working with cardiology on his symptoms.      Ayleen Cornejo RN, Care Coordination & GetWell Loop

## 2022-01-01 ENCOUNTER — OFFICE VISIT (OUTPATIENT)
Dept: FAMILY MEDICINE | Facility: CLINIC | Age: 72
End: 2022-01-01
Payer: MEDICARE

## 2022-01-01 ENCOUNTER — TELEPHONE (OUTPATIENT)
Dept: FAMILY MEDICINE | Facility: CLINIC | Age: 72
End: 2022-01-01

## 2022-01-01 ENCOUNTER — TRANSFERRED RECORDS (OUTPATIENT)
Dept: HEALTH INFORMATION MANAGEMENT | Facility: CLINIC | Age: 72
End: 2022-01-01
Payer: MEDICARE

## 2022-01-01 ENCOUNTER — MYC MEDICAL ADVICE (OUTPATIENT)
Dept: FAMILY MEDICINE | Facility: CLINIC | Age: 72
End: 2022-01-01
Payer: MEDICARE

## 2022-01-01 ENCOUNTER — APPOINTMENT (OUTPATIENT)
Dept: LAB | Facility: CLINIC | Age: 72
End: 2022-01-01
Payer: MEDICARE

## 2022-01-01 ENCOUNTER — LAB (OUTPATIENT)
Dept: LAB | Facility: CLINIC | Age: 72
End: 2022-01-01
Payer: MEDICARE

## 2022-01-01 ENCOUNTER — LAB (OUTPATIENT)
Dept: URGENT CARE | Facility: URGENT CARE | Age: 72
End: 2022-01-01
Payer: MEDICARE

## 2022-01-01 ENCOUNTER — HEALTH MAINTENANCE LETTER (OUTPATIENT)
Age: 72
End: 2022-01-01

## 2022-01-01 ENCOUNTER — NURSE TRIAGE (OUTPATIENT)
Dept: NURSING | Facility: CLINIC | Age: 72
End: 2022-01-01

## 2022-01-01 VITALS
WEIGHT: 201 LBS | SYSTOLIC BLOOD PRESSURE: 146 MMHG | RESPIRATION RATE: 16 BRPM | OXYGEN SATURATION: 97 % | TEMPERATURE: 98.4 F | DIASTOLIC BLOOD PRESSURE: 80 MMHG | HEART RATE: 91 BPM | BODY MASS INDEX: 30.56 KG/M2

## 2022-01-01 VITALS
TEMPERATURE: 97.3 F | HEART RATE: 82 BPM | OXYGEN SATURATION: 97 % | WEIGHT: 209 LBS | DIASTOLIC BLOOD PRESSURE: 87 MMHG | SYSTOLIC BLOOD PRESSURE: 168 MMHG | BODY MASS INDEX: 31.78 KG/M2

## 2022-01-01 VITALS
TEMPERATURE: 98.4 F | DIASTOLIC BLOOD PRESSURE: 82 MMHG | HEART RATE: 73 BPM | RESPIRATION RATE: 16 BRPM | OXYGEN SATURATION: 95 % | WEIGHT: 202 LBS | SYSTOLIC BLOOD PRESSURE: 124 MMHG | BODY MASS INDEX: 30.71 KG/M2

## 2022-01-01 VITALS
HEIGHT: 67 IN | BODY MASS INDEX: 31.39 KG/M2 | OXYGEN SATURATION: 92 % | HEART RATE: 98 BPM | WEIGHT: 200 LBS | TEMPERATURE: 97.2 F

## 2022-01-01 VITALS
RESPIRATION RATE: 16 BRPM | DIASTOLIC BLOOD PRESSURE: 74 MMHG | BODY MASS INDEX: 29.86 KG/M2 | WEIGHT: 197 LBS | HEIGHT: 68 IN | OXYGEN SATURATION: 98 % | SYSTOLIC BLOOD PRESSURE: 132 MMHG | HEART RATE: 56 BPM | TEMPERATURE: 97.6 F

## 2022-01-01 VITALS
OXYGEN SATURATION: 99 % | HEART RATE: 88 BPM | TEMPERATURE: 97.5 F | DIASTOLIC BLOOD PRESSURE: 86 MMHG | SYSTOLIC BLOOD PRESSURE: 132 MMHG | HEIGHT: 68 IN | RESPIRATION RATE: 16 BRPM | WEIGHT: 206 LBS | BODY MASS INDEX: 31.22 KG/M2

## 2022-01-01 VITALS
OXYGEN SATURATION: 96 % | TEMPERATURE: 97.9 F | WEIGHT: 201 LBS | BODY MASS INDEX: 30.56 KG/M2 | HEART RATE: 83 BPM | DIASTOLIC BLOOD PRESSURE: 60 MMHG | SYSTOLIC BLOOD PRESSURE: 120 MMHG

## 2022-01-01 VITALS
DIASTOLIC BLOOD PRESSURE: 77 MMHG | HEART RATE: 89 BPM | BODY MASS INDEX: 30.85 KG/M2 | SYSTOLIC BLOOD PRESSURE: 125 MMHG | WEIGHT: 197 LBS | TEMPERATURE: 98.3 F | OXYGEN SATURATION: 94 % | RESPIRATION RATE: 18 BRPM

## 2022-01-01 DIAGNOSIS — I25.10 ATHEROSCLEROSIS OF CORONARY ARTERY OF NATIVE HEART: Primary | ICD-10-CM

## 2022-01-01 DIAGNOSIS — I25.10 ASCVD (ARTERIOSCLEROTIC CARDIOVASCULAR DISEASE): Primary | ICD-10-CM

## 2022-01-01 DIAGNOSIS — J84.9 INTERSTITIAL PULMONARY DISEASE, UNSPECIFIED (H): ICD-10-CM

## 2022-01-01 DIAGNOSIS — Z12.5 SCREENING FOR PROSTATE CANCER: ICD-10-CM

## 2022-01-01 DIAGNOSIS — R53.83 FATIGUE, UNSPECIFIED TYPE: ICD-10-CM

## 2022-01-01 DIAGNOSIS — I10 HYPERTENSION GOAL BP (BLOOD PRESSURE) < 140/90: ICD-10-CM

## 2022-01-01 DIAGNOSIS — B37.2 INTERTRIGO OF GENITOCRURAL REGION DUE TO CANDIDA SPECIES: ICD-10-CM

## 2022-01-01 DIAGNOSIS — Z98.890 S/P CORONARY ANGIOGRAM: Primary | ICD-10-CM

## 2022-01-01 DIAGNOSIS — Z23 NEED FOR VACCINATION: ICD-10-CM

## 2022-01-01 DIAGNOSIS — I25.10 ASCVD (ARTERIOSCLEROTIC CARDIOVASCULAR DISEASE): ICD-10-CM

## 2022-01-01 DIAGNOSIS — B37.2 INTERTRIGO OF GENITOCRURAL REGION DUE TO CANDIDA SPECIES: Primary | ICD-10-CM

## 2022-01-01 DIAGNOSIS — I25.118 ATHEROSCLEROSIS OF CORONARY ARTERY OF NATIVE HEART WITH STABLE ANGINA PECTORIS, UNSPECIFIED VESSEL OR LESION TYPE (H): Primary | ICD-10-CM

## 2022-01-01 DIAGNOSIS — E78.5 HYPERLIPIDEMIA LDL GOAL <70: ICD-10-CM

## 2022-01-01 DIAGNOSIS — Z23 HIGH PRIORITY FOR 2019-NCOV VACCINE: ICD-10-CM

## 2022-01-01 DIAGNOSIS — Z13.220 SCREENING FOR HYPERLIPIDEMIA: ICD-10-CM

## 2022-01-01 DIAGNOSIS — H61.23 BILATERAL IMPACTED CERUMEN: ICD-10-CM

## 2022-01-01 DIAGNOSIS — Z09 HOSPITAL DISCHARGE FOLLOW-UP: Primary | ICD-10-CM

## 2022-01-01 DIAGNOSIS — I25.118 ATHEROSCLEROSIS OF NATIVE CORONARY ARTERY OF NATIVE HEART WITH STABLE ANGINA PECTORIS (H): ICD-10-CM

## 2022-01-01 DIAGNOSIS — Z20.822 SUSPECTED COVID-19 VIRUS INFECTION: ICD-10-CM

## 2022-01-01 DIAGNOSIS — R74.8 ELEVATED CREATINE KINASE: ICD-10-CM

## 2022-01-01 DIAGNOSIS — I73.9 PVD (PERIPHERAL VASCULAR DISEASE) (H): ICD-10-CM

## 2022-01-01 DIAGNOSIS — I25.10 ATHEROSCLEROSIS OF CORONARY ARTERY OF NATIVE HEART, UNSPECIFIED VESSEL OR LESION TYPE, UNSPECIFIED WHETHER ANGINA PRESENT: ICD-10-CM

## 2022-01-01 DIAGNOSIS — Z23 NEED FOR PROPHYLACTIC VACCINATION AND INOCULATION AGAINST INFLUENZA: ICD-10-CM

## 2022-01-01 DIAGNOSIS — I20.0 UNSTABLE ANGINA (H): ICD-10-CM

## 2022-01-01 DIAGNOSIS — I25.10 ATHEROSCLEROSIS OF CORONARY ARTERY OF NATIVE HEART: ICD-10-CM

## 2022-01-01 DIAGNOSIS — Z71.89 ADVANCED CARE PLANNING/COUNSELING DISCUSSION: ICD-10-CM

## 2022-01-01 DIAGNOSIS — R07.9 CHEST PAIN, UNSPECIFIED TYPE: Primary | ICD-10-CM

## 2022-01-01 DIAGNOSIS — F34.1 DYSTHYMIC DISORDER: ICD-10-CM

## 2022-01-01 DIAGNOSIS — L40.8 INVERSE PSORIASIS: Primary | ICD-10-CM

## 2022-01-01 DIAGNOSIS — I25.118 ATHEROSCLEROSIS OF CORONARY ARTERY OF NATIVE HEART WITH STABLE ANGINA PECTORIS, UNSPECIFIED VESSEL OR LESION TYPE (H): ICD-10-CM

## 2022-01-01 LAB
ALBUMIN SERPL-MCNC: 2.8 G/DL (ref 3.4–5)
ALP SERPL-CCNC: 119 U/L (ref 40–150)
ALT SERPL W P-5'-P-CCNC: 31 U/L (ref 0–70)
ANION GAP SERPL CALCULATED.3IONS-SCNC: 4 MMOL/L (ref 3–14)
ANION GAP SERPL CALCULATED.3IONS-SCNC: 6 MMOL/L (ref 3–14)
ANION GAP SERPL CALCULATED.3IONS-SCNC: 9 MMOL/L (ref 3–14)
AST SERPL W P-5'-P-CCNC: 31 U/L (ref 0–45)
BILIRUB SERPL-MCNC: 0.5 MG/DL (ref 0.2–1.3)
BUN SERPL-MCNC: 23 MG/DL (ref 7–30)
CALCIUM SERPL-MCNC: 9 MG/DL (ref 8.5–10.1)
CALCIUM SERPL-MCNC: 9.1 MG/DL (ref 8.5–10.1)
CALCIUM SERPL-MCNC: 9.7 MG/DL (ref 8.5–10.1)
CHLORIDE BLD-SCNC: 105 MMOL/L (ref 94–109)
CHLORIDE BLD-SCNC: 106 MMOL/L (ref 94–109)
CHLORIDE BLD-SCNC: 106 MMOL/L (ref 94–109)
CHOLEST SERPL-MCNC: 132 MG/DL
CHOLEST SERPL-MCNC: 157 MG/DL
CO2 SERPL-SCNC: 25 MMOL/L (ref 20–32)
CO2 SERPL-SCNC: 25 MMOL/L (ref 20–32)
CO2 SERPL-SCNC: 28 MMOL/L (ref 20–32)
CREAT SERPL-MCNC: 1.37 MG/DL (ref 0.66–1.25)
CREAT SERPL-MCNC: 1.37 MG/DL (ref 0.66–1.25)
CREAT SERPL-MCNC: 1.38 MG/DL (ref 0.66–1.25)
CREAT UR-MCNC: 76 MG/DL
ERYTHROCYTE [DISTWIDTH] IN BLOOD BY AUTOMATED COUNT: 15.2 % (ref 10–15)
ERYTHROCYTE [DISTWIDTH] IN BLOOD BY AUTOMATED COUNT: 15.5 % (ref 10–15)
FASTING STATUS PATIENT QL REPORTED: ABNORMAL
FASTING STATUS PATIENT QL REPORTED: YES
GFR SERPL CREATININE-BSD FRML MDRD: 54 ML/MIN/1.73M2
GLUCOSE BLD-MCNC: 102 MG/DL (ref 70–99)
GLUCOSE BLD-MCNC: 114 MG/DL (ref 70–99)
GLUCOSE BLD-MCNC: 99 MG/DL (ref 70–99)
HCT VFR BLD AUTO: 34.4 % (ref 40–53)
HCT VFR BLD AUTO: 41.9 % (ref 40–53)
HDLC SERPL-MCNC: 27 MG/DL
HDLC SERPL-MCNC: 28 MG/DL
HGB BLD-MCNC: 11.3 G/DL (ref 13.3–17.7)
HGB BLD-MCNC: 13.6 G/DL (ref 13.3–17.7)
LDLC SERPL CALC-MCNC: 78 MG/DL
LDLC SERPL CALC-MCNC: 95 MG/DL
MCH RBC QN AUTO: 28.9 PG (ref 26.5–33)
MCH RBC QN AUTO: 29.9 PG (ref 26.5–33)
MCHC RBC AUTO-ENTMCNC: 32.5 G/DL (ref 31.5–36.5)
MCHC RBC AUTO-ENTMCNC: 32.8 G/DL (ref 31.5–36.5)
MCV RBC AUTO: 89 FL (ref 78–100)
MCV RBC AUTO: 91 FL (ref 78–100)
MICROALBUMIN UR-MCNC: 26 MG/L
MICROALBUMIN/CREAT UR: 34.21 MG/G CR (ref 0–17)
NONHDLC SERPL-MCNC: 105 MG/DL
NONHDLC SERPL-MCNC: 129 MG/DL
PLATELET # BLD AUTO: 339 10E3/UL (ref 150–450)
PLATELET # BLD AUTO: 348 10E3/UL (ref 150–450)
POTASSIUM BLD-SCNC: 4.5 MMOL/L (ref 3.4–5.3)
POTASSIUM BLD-SCNC: 4.7 MMOL/L (ref 3.4–5.3)
POTASSIUM BLD-SCNC: 4.8 MMOL/L (ref 3.4–5.3)
PROT SERPL-MCNC: 6.7 G/DL (ref 6.8–8.8)
PSA SERPL-MCNC: 0.33 UG/L (ref 0–4)
RBC # BLD AUTO: 3.78 10E6/UL (ref 4.4–5.9)
RBC # BLD AUTO: 4.7 10E6/UL (ref 4.4–5.9)
SARS-COV-2 RNA RESP QL NAA+PROBE: NEGATIVE
SODIUM SERPL-SCNC: 136 MMOL/L (ref 133–144)
SODIUM SERPL-SCNC: 138 MMOL/L (ref 133–144)
SODIUM SERPL-SCNC: 140 MMOL/L (ref 133–144)
TRIGL SERPL-MCNC: 134 MG/DL
TRIGL SERPL-MCNC: 169 MG/DL
WBC # BLD AUTO: 11.4 10E3/UL (ref 4–11)
WBC # BLD AUTO: 9.2 10E3/UL (ref 4–11)

## 2022-01-01 PROCEDURE — 80053 COMPREHEN METABOLIC PANEL: CPT | Performed by: NURSE PRACTITIONER

## 2022-01-01 PROCEDURE — 0134A COVID-19,PF,MODERNA BIVALENT: CPT | Performed by: FAMILY MEDICINE

## 2022-01-01 PROCEDURE — 82043 UR ALBUMIN QUANTITATIVE: CPT

## 2022-01-01 PROCEDURE — 90662 IIV NO PRSV INCREASED AG IM: CPT | Performed by: FAMILY MEDICINE

## 2022-01-01 PROCEDURE — U0005 INFEC AGEN DETEC AMPLI PROBE: HCPCS

## 2022-01-01 PROCEDURE — 69209 REMOVE IMPACTED EAR WAX UNI: CPT | Mod: 50 | Performed by: NURSE PRACTITIONER

## 2022-01-01 PROCEDURE — 91313 COVID-19,PF,MODERNA BIVALENT: CPT | Performed by: FAMILY MEDICINE

## 2022-01-01 PROCEDURE — 96127 BRIEF EMOTIONAL/BEHAV ASSMT: CPT | Performed by: NURSE PRACTITIONER

## 2022-01-01 PROCEDURE — 36415 COLL VENOUS BLD VENIPUNCTURE: CPT

## 2022-01-01 PROCEDURE — 99214 OFFICE O/P EST MOD 30 MIN: CPT | Performed by: NURSE PRACTITIONER

## 2022-01-01 PROCEDURE — 93000 ELECTROCARDIOGRAM COMPLETE: CPT | Performed by: NURSE PRACTITIONER

## 2022-01-01 PROCEDURE — 80061 LIPID PANEL: CPT

## 2022-01-01 PROCEDURE — 99214 OFFICE O/P EST MOD 30 MIN: CPT | Mod: 25 | Performed by: FAMILY MEDICINE

## 2022-01-01 PROCEDURE — 36415 COLL VENOUS BLD VENIPUNCTURE: CPT | Performed by: NURSE PRACTITIONER

## 2022-01-01 PROCEDURE — G0008 ADMIN INFLUENZA VIRUS VAC: HCPCS | Performed by: FAMILY MEDICINE

## 2022-01-01 PROCEDURE — 99215 OFFICE O/P EST HI 40 MIN: CPT | Performed by: NURSE PRACTITIONER

## 2022-01-01 PROCEDURE — 80048 BASIC METABOLIC PNL TOTAL CA: CPT

## 2022-01-01 PROCEDURE — 99215 OFFICE O/P EST HI 40 MIN: CPT | Mod: 25 | Performed by: NURSE PRACTITIONER

## 2022-01-01 PROCEDURE — G0103 PSA SCREENING: HCPCS

## 2022-01-01 PROCEDURE — 85027 COMPLETE CBC AUTOMATED: CPT | Performed by: NURSE PRACTITIONER

## 2022-01-01 PROCEDURE — 90750 HZV VACC RECOMBINANT IM: CPT | Performed by: NURSE PRACTITIONER

## 2022-01-01 PROCEDURE — U0003 INFECTIOUS AGENT DETECTION BY NUCLEIC ACID (DNA OR RNA); SEVERE ACUTE RESPIRATORY SYNDROME CORONAVIRUS 2 (SARS-COV-2) (CORONAVIRUS DISEASE [COVID-19]), AMPLIFIED PROBE TECHNIQUE, MAKING USE OF HIGH THROUGHPUT TECHNOLOGIES AS DESCRIBED BY CMS-2020-01-R: HCPCS

## 2022-01-01 PROCEDURE — 90471 IMMUNIZATION ADMIN: CPT | Performed by: NURSE PRACTITIONER

## 2022-01-01 PROCEDURE — 99213 OFFICE O/P EST LOW 20 MIN: CPT | Performed by: NURSE PRACTITIONER

## 2022-01-01 PROCEDURE — 85027 COMPLETE CBC AUTOMATED: CPT

## 2022-01-01 RX ORDER — NITROGLYCERIN 0.4 MG/1
0.4 TABLET SUBLINGUAL EVERY 5 MIN PRN
Status: COMPLETED | OUTPATIENT
Start: 2022-01-01 | End: 2022-01-01

## 2022-01-01 RX ORDER — HYDROXYZINE PAMOATE 25 MG/1
25-50 CAPSULE ORAL
COMMUNITY

## 2022-01-01 RX ORDER — FLUTICASONE PROPIONATE 0.05 %
CREAM (GRAM) TOPICAL 2 TIMES DAILY
Qty: 60 G | Refills: 0 | Status: SHIPPED | OUTPATIENT
Start: 2022-01-01

## 2022-01-01 RX ORDER — QUETIAPINE FUMARATE 25 MG/1
TABLET, FILM COATED ORAL
COMMUNITY
Start: 2022-01-01

## 2022-01-01 RX ORDER — DILTIAZEM HYDROCHLORIDE 180 MG/1
CAPSULE, EXTENDED RELEASE ORAL
COMMUNITY
Start: 2022-01-01 | End: 2022-01-01

## 2022-01-01 RX ORDER — NYSTATIN 100000 [USP'U]/G
POWDER TOPICAL 3 TIMES DAILY
Qty: 30 G | Refills: 0 | Status: SHIPPED | OUTPATIENT
Start: 2022-01-01 | End: 2022-01-01

## 2022-01-01 RX ORDER — DOXAZOSIN 2 MG/1
2 TABLET ORAL AT BEDTIME
COMMUNITY
Start: 2022-01-01 | End: 2022-01-01

## 2022-01-01 RX ORDER — FLUCONAZOLE 150 MG/1
150 TABLET ORAL WEEKLY
Qty: 4 TABLET | Refills: 0 | Status: SHIPPED | OUTPATIENT
Start: 2022-01-01 | End: 2022-01-01

## 2022-01-01 RX ORDER — DILTIAZEM HYDROCHLORIDE 180 MG/1
180 CAPSULE, EXTENDED RELEASE ORAL
COMMUNITY
Start: 2022-01-01 | End: 2022-01-01

## 2022-01-01 RX ORDER — CLOTRIMAZOLE 1 %
CREAM (GRAM) TOPICAL 2 TIMES DAILY
Qty: 15 G | Refills: 0 | Status: SHIPPED | OUTPATIENT
Start: 2022-01-01 | End: 2022-01-01

## 2022-01-01 RX ORDER — ASPIRIN 81 MG/1
81 TABLET, CHEWABLE ORAL ONCE
Status: COMPLETED | OUTPATIENT
Start: 2022-01-01 | End: 2022-01-01

## 2022-01-01 RX ORDER — CLONIDINE HYDROCHLORIDE 0.1 MG/1
0.1 TABLET ORAL
COMMUNITY
Start: 2022-01-01

## 2022-01-01 RX ORDER — ISOSORBIDE DINITRATE 20 MG/1
40 TABLET ORAL
COMMUNITY
Start: 2022-01-01 | End: 2022-01-01

## 2022-01-01 RX ORDER — DOXAZOSIN 2 MG/1
2 TABLET ORAL
COMMUNITY
Start: 2022-01-01 | End: 2022-01-01

## 2022-01-01 RX ORDER — RANOLAZINE 500 MG/1
TABLET, EXTENDED RELEASE ORAL
COMMUNITY
Start: 2022-01-01

## 2022-01-01 RX ORDER — HYDROXYZINE PAMOATE 25 MG/1
25-50 CAPSULE ORAL 3 TIMES DAILY PRN
Qty: 180 CAPSULE | Refills: 0 | Status: SHIPPED | OUTPATIENT
Start: 2022-01-01 | End: 2022-01-01

## 2022-01-01 RX ORDER — ISOSORBIDE DINITRATE 10 MG/1
10 TABLET ORAL
COMMUNITY
End: 2022-01-01

## 2022-01-01 RX ORDER — CLOPIDOGREL BISULFATE 75 MG/1
75 TABLET ORAL DAILY
Qty: 90 TABLET | Refills: 3 | Status: SHIPPED | OUTPATIENT
Start: 2022-01-01

## 2022-01-01 RX ORDER — CLOPIDOGREL BISULFATE 75 MG/1
75 TABLET ORAL
COMMUNITY
End: 2022-01-01

## 2022-01-01 RX ORDER — ISOSORBIDE DINITRATE 20 MG/1
80 TABLET ORAL
COMMUNITY
Start: 2022-01-01

## 2022-01-01 RX ORDER — GRISEOFULVIN 250 MG/1
500 TABLET ORAL DAILY
Qty: 30 TABLET | Refills: 0 | Status: SHIPPED | OUTPATIENT
Start: 2022-01-01 | End: 2022-01-01

## 2022-01-01 RX ORDER — CLONIDINE HYDROCHLORIDE 0.1 MG/1
0.05 TABLET ORAL
COMMUNITY
Start: 2022-01-01

## 2022-01-01 RX ORDER — ALPRAZOLAM 0.5 MG
TABLET ORAL
COMMUNITY
Start: 2022-01-01

## 2022-01-01 RX ORDER — DOXAZOSIN 2 MG/1
2 TABLET ORAL AT BEDTIME
OUTPATIENT
Start: 2022-01-01

## 2022-01-01 RX ORDER — ISOSORBIDE DINITRATE 20 MG/1
TABLET ORAL
Qty: 270 TABLET | Refills: 0 | OUTPATIENT
Start: 2022-01-01

## 2022-01-01 RX ORDER — ISOSORBIDE DINITRATE 10 MG/1
20 TABLET ORAL
COMMUNITY
Start: 2022-01-01 | End: 2022-01-01

## 2022-01-01 RX ORDER — EZETIMIBE 10 MG/1
10 TABLET ORAL DAILY
Qty: 90 TABLET | Refills: 0 | OUTPATIENT
Start: 2022-01-01

## 2022-01-01 RX ORDER — RANOLAZINE 500 MG/1
500 TABLET, EXTENDED RELEASE ORAL
COMMUNITY
Start: 2022-01-01

## 2022-01-01 RX ORDER — NITROGLYCERIN 0.4 MG/1
0.4 TABLET SUBLINGUAL EVERY 5 MIN PRN
Qty: 60 TABLET | Status: SHIPPED | OUTPATIENT
Start: 2022-01-01

## 2022-01-01 RX ORDER — CLOPIDOGREL BISULFATE 75 MG/1
TABLET ORAL
Qty: 90 TABLET | Refills: 0 | Status: SHIPPED | OUTPATIENT
Start: 2022-01-01 | End: 2022-01-01

## 2022-01-01 RX ADMIN — NITROGLYCERIN 0.4 MG: 0.4 TABLET SUBLINGUAL at 13:57

## 2022-01-01 RX ADMIN — NITROGLYCERIN 0.4 MG: 0.4 TABLET SUBLINGUAL at 14:48

## 2022-01-01 RX ADMIN — ASPIRIN 81 MG: 81 TABLET, CHEWABLE ORAL at 14:51

## 2022-01-01 RX ADMIN — NITROGLYCERIN 0.4 MG: 0.4 TABLET SUBLINGUAL at 14:52

## 2022-01-01 ASSESSMENT — PATIENT HEALTH QUESTIONNAIRE - PHQ9
SUM OF ALL RESPONSES TO PHQ QUESTIONS 1-9: 3
SUM OF ALL RESPONSES TO PHQ QUESTIONS 1-9: 8
SUM OF ALL RESPONSES TO PHQ QUESTIONS 1-9: 8
SUM OF ALL RESPONSES TO PHQ QUESTIONS 1-9: 3
10. IF YOU CHECKED OFF ANY PROBLEMS, HOW DIFFICULT HAVE THESE PROBLEMS MADE IT FOR YOU TO DO YOUR WORK, TAKE CARE OF THINGS AT HOME, OR GET ALONG WITH OTHER PEOPLE: SOMEWHAT DIFFICULT
10. IF YOU CHECKED OFF ANY PROBLEMS, HOW DIFFICULT HAVE THESE PROBLEMS MADE IT FOR YOU TO DO YOUR WORK, TAKE CARE OF THINGS AT HOME, OR GET ALONG WITH OTHER PEOPLE: SOMEWHAT DIFFICULT

## 2022-01-01 ASSESSMENT — ANXIETY QUESTIONNAIRES
7. FEELING AFRAID AS IF SOMETHING AWFUL MIGHT HAPPEN: NEARLY EVERY DAY
GAD7 TOTAL SCORE: 15
GAD7 TOTAL SCORE: 15
5. BEING SO RESTLESS THAT IT IS HARD TO SIT STILL: NOT AT ALL
GAD7 TOTAL SCORE: 15
GAD7 TOTAL SCORE: 15
3. WORRYING TOO MUCH ABOUT DIFFERENT THINGS: NEARLY EVERY DAY
6. BECOMING EASILY ANNOYED OR IRRITABLE: NOT AT ALL
4. TROUBLE RELAXING: NEARLY EVERY DAY
7. FEELING AFRAID AS IF SOMETHING AWFUL MIGHT HAPPEN: NEARLY EVERY DAY
1. FEELING NERVOUS, ANXIOUS, OR ON EDGE: NEARLY EVERY DAY
2. NOT BEING ABLE TO STOP OR CONTROL WORRYING: NEARLY EVERY DAY

## 2022-02-24 PROBLEM — I21.4 NSTEMI (NON-ST ELEVATED MYOCARDIAL INFARCTION) (H): Status: ACTIVE | Noted: 2020-03-13

## 2022-02-24 PROBLEM — I72.9 PSEUDOANEURYSM FOLLOWING PROCEDURE (H): Status: ACTIVE | Noted: 2020-03-13

## 2022-02-24 PROBLEM — Z82.49 FAMILY HISTORY OF ISCHEMIC HEART DISEASE: Status: ACTIVE | Noted: 2022-01-01

## 2022-02-24 PROBLEM — I73.9 PERIPHERAL ARTERIAL DISEASE (H): Status: ACTIVE | Noted: 2020-03-08

## 2022-02-24 PROBLEM — Z78.9 STATIN INTOLERANCE: Status: ACTIVE | Noted: 2018-09-26

## 2022-02-24 PROBLEM — I25.10 ASCVD (ARTERIOSCLEROTIC CARDIOVASCULAR DISEASE): Status: ACTIVE | Noted: 2022-01-01

## 2022-02-24 PROBLEM — T81.718A PSEUDOANEURYSM FOLLOWING PROCEDURE (H): Status: ACTIVE | Noted: 2020-03-13

## 2022-02-24 PROBLEM — R09.89 OTHER SYMPTOMS INVOLVING CARDIOVASCULAR SYSTEM: Status: ACTIVE | Noted: 2022-01-01

## 2022-02-24 PROBLEM — Z98.890 HISTORY OF LEFT-SIDED CAROTID ENDARTERECTOMY: Status: ACTIVE | Noted: 2018-01-25

## 2022-02-24 NOTE — NURSING NOTE
Patient identified using two patient identifiers.  Ear exam showing wax occlusion completed by provider.  Solution: warm water was placed in the bilateral ear(s) via irrigation tool: elephant ear.    Dulce Moore MA

## 2022-02-24 NOTE — PROGRESS NOTES
"  Assessment & Plan     Intertrigo of genitocrural region due to Candida species  Moderate erythematous excoriated Candida infection to groin folds, perineum, scrotum  Plan:  - fluconazole (DIFLUCAN) 150 MG tablet weekly x4 weeks   - clotrimazole (LOTRIMIN) 1 % external cream    -Encouraged to keep area clean and dry    Bilateral impacted cerumen  Unable to visualize TMs; MA completed irrigation in clinic; provider unavailable for follow-up notified by MA successful irrigation  Plan:  - NH REMOVAL IMPACTED CERUMEN IRRIGATION/LVG UNILAT  -Encourage Debrox drops weekly    Need for vaccination  - ZOSTER VACCINE RECOMBINANT ADJUVANTED IM NJX      Ordering of each unique test  Prescription drug management    40 minutes spent on the date of the encounter doing chart review, history and exam, documentation and further activities per the note  2     BMI:   Estimated body mass index is 31.32 kg/m  as calculated from the following:    Height as of this encounter: 1.727 m (5' 8\").    Weight as of this encounter: 93.4 kg (206 lb).   Weight management plan: deferred    FUTURE APPOINTMENTS:       - Follow-up visit in no improvement in 3 to 4 weeks       - Follow-up for annual visit or as needed  JEREMY Tanner CNP  [facility]    Laith Tariq is a 72 year old who presents for the following health issues      History of Present Illness   73-year-old male past dermatological history psoriasis followed by dermatology specialist last seen approximately 2 months ago in clinic today for skin concerns to bilateral groin folds and cerumen impaction.  Groin infection: See patient comments below  Bilateral ear cumen impaction has been using debrox for the past week.    Reason for visit:  Infection in groin    Groin pain has been going on for months on both sides, its red, bleeds here and there and hurts. It seems to get worse over the time. Has been taking ketoconazole and its not working.    Patient " "states both ears feel plugged, its been going on for a couple of weeks. Has been using debrox. It helps but not completely.     He eats 4 or more servings of fruits and vegetables daily.He consumes 0 sweetened beverage(s) daily.He exercises with enough effort to increase his heart rate 9 or less minutes per day.  He exercises with enough effort to increase his heart rate 3 or less days per week.   He is taking medications regularly.    Review of Systems   Constitutional, HEENT, cardiovascular, pulmonary, gi and gu systems are negative, except as otherwise noted.      Objective    /86 (BP Location: Left arm, Patient Position: Sitting, Cuff Size: Adult Regular)   Pulse 88   Temp 97.5  F (36.4  C) (Temporal)   Resp 16   Ht 1.727 m (5' 8\")   Wt 93.4 kg (206 lb)   SpO2 99%   BMI 31.32 kg/m    Body mass index is 31.32 kg/m .       Physical Exam   GENERAL: healthy, alert and no distress  RESP: lungs clear to auscultation - no rales, rhonchi or wheezes  CV: regular rate and rhythm, normal S1 S2, no S3 or S4, no murmur, click or rub, no peripheral edema and peripheral pulses strong   (male): testicles normal without atrophy or masses  SKIN: erythema - groin and excoriation - groin    No results found for any visits on 02/24/22.            Answers for HPI/ROS submitted by the patient on 2/24/2022  How many servings of fruits and vegetables do you eat daily?: 4 or more  On average, how many sweetened beverages do you drink each day (Examples: soda, juice, sweet tea, etc.  Do NOT count diet or artificially sweetened beverages)?: 0  How many minutes a day do you exercise enough to make your heart beat faster?: 9 or less  How many days a week do you exercise enough to make your heart beat faster?: 3 or less  How many days per week do you miss taking your medication?: 0  What is the reason for your visit today?: Infection in groin      "

## 2022-03-01 NOTE — TELEPHONE ENCOUNTER
[facility] pharmacy received a prescription on 2/24/22 for Fluconazole for patient and there is a drug interaction with Colchicine.  They are asking if you want to change the Fluconazole to something else.  Thought Griseofulvicin might be a possibility.  There was a phone call about meds on 2/24/22.  Please advise.  Patient is eager to get going on the antifungal treatment.  temporal arteritis

## 2022-03-02 NOTE — TELEPHONE ENCOUNTER
Please notify patient:  Discontinue fluconazole changed to Griseofulvin 500 mg daily x4 weeks.  Prescription sent to [facility] pharmacy on [address].

## 2022-03-02 NOTE — TELEPHONE ENCOUNTER
Please notify patient:  Discontinue fluconazole changed to Griseofulvin 500 mg daily x4 weeks.  Prescription sent to [facility] pharmacy on [address].  JEREMY Tanner CNP

## 2022-03-02 NOTE — TELEPHONE ENCOUNTER
Writer responded via [employer].    MICHELLE GuthrieN, RN  [facility]

## 2022-03-10 NOTE — TELEPHONE ENCOUNTER
It looks like that was based on the RN refill protocol for refilling Plavix.  That medication should be refilled by his cardiologist.  No need for labs at this time, typically his cardiologist orders labs that he needs.

## 2022-03-10 NOTE — TELEPHONE ENCOUNTER
Writer responded via [employer].    MICHELLE GuthrieN, RN  [facility]

## 2022-03-10 NOTE — TELEPHONE ENCOUNTER
Tina-     Patient showed up for lab appt with no future orders in- see refill request from 3/7/22, assuming this is for a CBC?    Auera Boo, MICHELLEN RN  [facility]

## 2022-03-11 NOTE — PROGRESS NOTES
Assessment & Plan     Intertrigo of genitocrural region due to Candida species  Groin improved since last visit; mild erythema present and groin area healing; improved fissures  Plan: add Vistaril for pruritus; nystatin powder to keep area dry  - hydrOXYzine (VISTARIL) 25 MG capsule  Dispense: 180 capsule; Refill: 0  - nystatin (MYCOSTATIN) 501113 UNIT/GM external powder  Dispense: 30 g; Refill:     Screening for prostate cancer  - REVIEW OF HEALTH MAINTENANCE PROTOCOL ORDERS  - PROSTATE SPEC ANTIGEN SCREEN    Screening for hyperlipidemia  - Lipid panel reflex to direct LDL Fasting    Atherosclerosis of coronary artery of native heart with stable angina pectoris, unspecified vessel or lesion type (H)  Stable; following cardiology  - BASIC METABOLIC PANEL    Hypertension goal BP (blood pressure) < 140/90  BP suboptimal control in clinic encouraged home blood pressure monitoring and follow-up with PCP if greater than 130/80  Plan:  - Albumin Random Urine Quantitative with Creat Ratio  - BASIC METABOLIC PANEL    PVD (peripheral vascular disease) (H)  Stable; no changes following cardiology    Prescription drug management    Return in about 5 weeks (around 4/15/2022) for Follow up.    JEREMY Tanner [facility]    Laith Tariq is a 72 year old who presents for the following health issues     Tank in clinic for follow-up intertrigo of groin last seen 2/24/2022 he states the area continues to itchy and painful; unable to visualize area due to location.  At last visit patient was started on fluconazole which was changed to Griseofulvin 500 mg daily x4 weeks secondary to contraindication with colchicine and Lotrimin cream.  Patient reports he just started 1-2 doses of antifungal; has trialed cream with mild improvement.  He reports itching mostly at night.    History of Present Illness       Reason for visit:  Infection  Symptom onset:  3-4 weeks ago  Symptoms include:  Fungal  infection in groin  Symptom intensity:  Moderate  Symptom progression:  Staying the same  Had these symptoms before:  Yes  Has tried/received treatment for these symptoms:  Yes  Previous treatment was successful:  No  What makes it worse:  No  What makes it better:  No    He eats 4 or more servings of fruits and vegetables daily.He consumes 0 sweetened beverage(s) daily.He exercises with enough effort to increase his heart rate 9 or less minutes per day.  He exercises with enough effort to increase his heart rate 3 or less days per week.   He is taking medications regularly.       Review of Systems   Constitutional, HEENT, cardiovascular, pulmonary, gi and gu systems are negative, except as otherwise noted.      Objective    BP (!) 168/87 (BP Location: Right arm, Patient Position: Chair, Cuff Size: Adult Regular)   Pulse 82   Temp 97.3  F (36.3  C) (Temporal)   Wt 94.8 kg (209 lb)   SpO2 97%   BMI 31.78 kg/m    Body mass index is 31.78 kg/m .     Physical Exam

## 2022-03-25 NOTE — RESULT ENCOUNTER NOTE
Dear Marciano,     Here are your recent results.  -PSA (prostate specific antigen) test is normal.  This indicates a low likelihood of prostate cancer.  ADVISE: rechecking this in 1 year.    -HDL(good) cholesterol level is low and your triglycerides are elevated which can increase your heart disease risk.  A diet high in fat and simple carbohydrates, genetics and being overweight can contribute to this.     LDL(bad) cholesterol level is normal.  ADVISE:exercising 150 minutes of aerobic exercise per week (30 minutes 5 days per week or 50 minutes 3 days per week are options), and omega-3 fatty acids (fish oil) 2813-0431 mg daily are helpful to improve this.  In 6 months, you should recheck your fasting cholesterol panel by scheduling a lab-only appointment.    -Kidney function (GFR) is decreased.  ADVISE: rechecking this in 3 months  -Sodium is normal.    -Potassium is normal.  -Calcium is normal.  -Glucose is slight elevated and may be a sign of early diabetes (prediabetes). ADVISE:: eating a low carbohydrate diet, exercising, trying to lose weight (if necessary) and rechecking your glucose level in 6 months.    -Microalbumin (urine protein) level is elevated. This is suggestive of early damage to your kidneys from high blood pressure.  ADVISE: avoiding anti-inflamatory agents such as ibuprofen (Advil, Motrin) or naproxen (Aleve) as much as possible, keeping your blood pressure in a normal range, and continuing your medication that helps protect your kidneys.  Also, this should be rechecked in 1 year. .    Please let us know if you have any questions or concerns.     Regards,  JEREMY Tanner CNP

## 2022-03-30 NOTE — PROGRESS NOTES
Assessment & Plan     (Z98.890) S/P coronary angiogram  (primary encounter diagnosis)  Comment: stable  Plan: He will follow up with brachytherapy scheduled for 4/6, he will follow up with me on 4/12.    (I25.10) ASCVD (arteriosclerotic cardiovascular disease)  Comment: stable  Plan: See above.     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      43 minutes spent on the date of the encounter doing chart review, patient visit and documentation        Depression Screening Follow Up    PHQ 3/30/2022   PHQ-9 Total Score 8   Q9: Thoughts of better off dead/self-harm past 2 weeks Several days   F/U: Thoughts of suicide or self-harm No   F/U: Safety concerns No                No follow-ups on file.    Treva Saleem NP  [facility]    Laith Tariq is a 72 year old who presents for the following health issues     History of Present Illness       Mental Health Follow-up:                    Today's PHQ-9         PHQ-9 Total Score: 8  PHQ-9 Q9 Thoughts of better off dead/self-harm past 2 weeks :   (P) Several days  Thoughts of suicide or self harm: (P) No  Self-harm Plan:     Self-harm Action:       Safety concerns for self or others: (P) No    How difficult have these problems made it for you to do your work, take care of things at home, or get along with other people: Somewhat difficult    Today's SHANNON-7 Score: 15    Reason for visit:  Post surgery check    He eats 2-3 servings of fruits and vegetables daily.He consumes 0 sweetened beverage(s) daily.He exercises with enough effort to increase his heart rate 9 or less minutes per day.  He exercises with enough effort to increase his heart rate 3 or less days per week.   He is taking medications regularly.         Hospital Follow-up Visit:    Hospital/Nursing Home/IP Rehab Facility: [facility]  Date of Admission:  3/22/22  Date of Discharge: 3/24/22  Reason(s) for Admission: Chest Pain, SOB      Was your hospitalization related to COVID-19? No   Problems taking medications regularly:  Horrible side effects  Medication changes since discharge: NIFEdipine (ADALAT CC) 60 mg Extended-Release tablet and isosorbide dinitrate (ISORDIL) tablet      Problems adhering to non-medication therapy:  None    Summary of hospitalization:  [facility]    HOSPITALIST DISCHARGE SUMMARY   [facility]     Admission Date: 3/22/2022  Discharge Date: 3/24/2022    Discharge Plan: Braeden Umana was discharged to home.    Principal Diagnosis     Cardiac chest pain  Unstable angina     Hospital Problem List   Active Problems:  ASCVD (arteriosclerotic cardiovascular disease)  BPH (benign prostatic hypertrophy)  Obstructive Sleep Apnea  Mixed hyperlipidemia (Initial LDL - 143, LDL goal < 70)  CKD (chronic kidney disease) stage 2, GFR 60-89 ml/min  Hypertension  Peripheral arterial disease (HC)    ADDITIONAL COMMENTS REGARDING DIAGNOSIS SPECIFICITY  Additional Diagnosis Information         BMI>30, which is consistent with OBESITY. This is clinically significant due to increased nursing cares, use of resources and specialty equipment.       Hospital Course     Mr. Braeden Umana is a 72 y.o. male with a history of JONNATHAN (on CPAP), extensive vascular disease (s/p left CEA, multiple PCI), hypertension, dyslipidemia, multiple allergies to cardiac medications and statins, CKD who presents with chest and jaw pain.     He reports being in his usual state of health, he has had progressive intermittent chest tightness with radiating jaw pain over the past month. He recently saw cardiology this past week, has a nuclear stress test scheduled in 3 days, but noticeable increase in symptoms a day prior to admission and thus he presented to the emergency room for further evaluations.    In the ER, vitals  stable, labs showed creatinine 1.44 (baseline), initial troponin was negative, ECG unchanged chest x-ray showed ongoing likely interstitial opacities concerning for fibrosis. He was given nitro x1 and was referred to observation.    Upon interview, he is chest pain-free, he also notes ongoing progressive coughing in the past 3 months, this is a dry cough. He feels that this particular episode of chest tightness is a little bit different than his usual chest pain and he is more concerned about a pulmonary aspect to this. 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    Of note he has extensive allergy listed to most cardiac medications. He also has anaphylactic reaction to contrast iodine.    During admission, evaluated by cardiology and underwent stress test, this returned positive and angiogram showed in-stent thrombosis of distal LAD, recommended he undergo staged therapy with brachytherapy given multiple allergies to medications. He was also started on nifedipine and will follow closely with cardiology.     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     Recommendations for Outpatient Provider   PCP: Treva Saleem NP     Recommendations for outpatient provider   Specific recommendations to be addressed at the follow up visit:  Chest pain- will start nifedipine and schedule brachytherapy  Cough, septal prominence- will refer to pulmonary as outpatient    Medication regimen changes: no medications were changed.    Follow-up labs/imaging: none    Other specialty follow-up not included in DC orders: None    Special considerations: none.    Functional evaluations:   Fall Risk: Total Score (If 5 or > is High Risk): 6 (03/24/22 0900)  NuDESC (>/=2 abnormal): 0 (03/24/22 0900)    MOCA: // SLUMS:         Discharge Medications       Your Home Medicines     START taking these medicines   Instructions   NIFEdipine 60 mg Extended-Release tablet  For diagnoses: ASCVD (arteriosclerotic cardiovascular disease)  Commonly known as: ADALAT CC  Take 1 Tablet (60 mg) by mouth once daily before a meal.      TAKE THE MEDICINE(S) AS PRESCRIBED BELOW. The provider has reviewed how you said you take these medicine(s) and wants you to take them as prescribed, not as how you reported taking them.   Instructions   clopidogreL 75 mg tablet  For diagnoses: ASCVD (arteriosclerotic cardiovascular disease)  Commonly known as: PLAVIX  Take 1 tablet by mouth every morning.      CONTINUE taking these medicines   Instructions   ALPRAZolam 0.5 mg tablet  Commonly known as: XANAX  Take 0.5 mg by mouth at bedtime.    aspirin chewable 81 mg chewable tablet  For diagnoses: ASHD (arteriosclerotic heart disease)  Take 1 tablet by mouth once daily with a meal.    cyanocobalamin 5,000 mcg sublingual tablet  Commonly known as: VITAMIN B12  Place 1 Tab under the tongue once daily.    DULoxetine 60 mg Delayed-release capsule  Commonly known as: CYMBALTA  Take 60 mg by mouth once daily.    ezetimibe 10 mg tablet  For diagnoses: ASCVD (arteriosclerotic cardiovascular disease)  Commonly known as: Zetia  Take 1 Tablet (10 mg) by mouth once daily. For cholesterol lowering.    gabapentin 300 mg capsule  Commonly known as: NEURONTIN  Take 600 mg by mouth at bedtime.    griseofulvin microsize 500 mg tablet  Commonly known as: GRIFULVIN V  Take 500 mg by mouth once daily with a meal.    hydrOXYzine pamoate 25 mg capsule  Commonly known as: VISTARIL  Take 25-50 mg by mouth every 8 hours if needed for Anxiety.    isosorbide dinitrate 10 mg tablet  For diagnoses: Coronary artery disease of native artery of native heart with stable angina pectoris  Commonly known as: ISORDIL  Take 1 Tablet (10 mg) by mouth 3 times daily.    nitroglycerin 0.4  mg sublingual tablet  For diagnoses: ASCVD (arteriosclerotic cardiovascular disease)  Commonly known as: NITROSTAT  Place 1 tablet under the tongue every 5 minutes if needed for Other (Specify) (To Help Dilate Cardiac Arteries).    nystatin powder powder  Commonly known as: MYCOSTATIN  Apply 1 Strip topically to affected area(s) 3 times daily. Apply to groin        Where to get your medicines     These medications were sent to [facility] [address] [place] [id number], [facility] [address]   Hours: Open 24 Hours Phone: [phone]     NIFEdipine 60 mg Extended-Release tablet      Pertinent Findings / Procedures   First weight: 93.4 kg (206 lb) (03/22/22 1041) Last weight: 93.4 kg (206 lb) (03/22/22 1041)    Labs    Renal Heme GI     03/23/22  1100   SODIUM 139   POTASSIUM 4.6   CHLORIDE 104   AS6DHOCH 25   ANIONGAP 10   BUN 18   CREATININE 1.23   GLUCOSE 96   CALCIUM 9.2     03/22/22  1106   WBC 9.9   HGB 14.0   HCT 41.6   MCV 91   MCH 30.5   MCHC 33.7   RDW 14.5      NEUTROPHILS 60.4   LYMPHS 24.3   MONOS 10.1   EOSINOS 4.0   BASOS 0.9       Cardiopulmonary ID Endo     03/22/22  1106 03/22/22  1558   TROPONINI 0.011 <0.010   BNP 44 --   COVID-19: Negative 3/22/2022      Imaging  Optional Imaging Link - will pull in FULL report: XR CHEST 2 VIEWS PA AND LATERAL  Narrative: For Patients: As a result of the 21st Century Cures Act, medical imaging exams and procedure reports are released immediately into your electronic medical record. You may view this report before your referring provider. If you have questions, please contact your health care provider.      INDICATION:  Chest pain.    TECHNIQUE:  Two-view chest.    COMPARISON:  November 9, 2020.    FINDINGS:  Diffuse interstitial prominence likely related to fibrosis. Superimposed edema is possible. Overall heart size is within normal limits. Coronary artery calcification and/or coronary artery stents. Mild spurring of the thoracic  spine.    Impression: Persistent interstitial opacities likely related to fibrosis. Superimposed edema is possible.    Dictated by Stevie Cobos MD @ Mar 22 2022 11:49AM    (Electronically Signed)    Consultants     Encounter Notes   Consults from Arsalan Perla MD (Cardiovascular Disease)      Diagnostic Tests/Treatments reviewed.  Follow up needed: he is scheduled to have brachytherapy on 4/6/22  Other Healthcare Providers Involved in Patient s Care:           Update since discharge: stable. Post Discharge Medication Reconciliation: discharge medications reconciled and changed, per note/orders.  Plan of care communicated with patient              Review of Systems         Objective    BP (!) 146/80   Pulse 91   Temp 98.4  F (36.9  C) (Temporal)   Resp 16   Wt 91.2 kg (201 lb)   SpO2 97%   BMI 30.56 kg/m    Body mass index is 30.56 kg/m .  Physical Exam   GENERAL: healthy, alert and no distress  SKIN: ecchymoses right groin; incision healing well without erythema, drainage  PSYCH: mentation appears normal, affect slightly anxious

## 2022-04-04 NOTE — TELEPHONE ENCOUNTER
Med list updated.    Writer responded via [employer].    MICHELLE GuthrieN, RN  [facility]

## 2022-04-07 NOTE — TELEPHONE ENCOUNTER
"Tina-Please review update from patient.  Writer updated med list.    \"Cardiologist (Arsalan Ross) prescribed Doxazosin 2mg / day yesterday.  First bp reading this morning was 154/94 compared to 194/116 yesterday.! Current side effects would prevent me from driving, but as long as they don't get much worse I can tolerate that at least until I get my surgery.\"    Writer responded via [employer].      Thank you!  MICHELLE GuthrieN, RN  [facility]    "

## 2022-04-18 NOTE — TELEPHONE ENCOUNTER
Reason for Call:  Other appointment    Detailed comments: Pt wondering if provider can fit him in on Monday 4/25 for a hosp follow up. He is getting discharge from [facility] today. He went in for possible heart attack which it wasn't.     Phone Number Patient can be reached at: Home number on file [phone] (home)    Best Time: anytime    Can we leave a detailed message on this number? YES    Call taken on 4/18/2022 at 11:53 AM by Tanya Alcocer

## 2022-04-25 NOTE — PROGRESS NOTES
Assessment & Plan     (R07.9) Chest pain, unspecified type  (primary encounter diagnosis)  Comment: acute, worsening  Plan: EKG 12-lead complete w/read - Clinics,         nitroGLYcerin (NITROSTAT) sublingual tablet 0.4        mg, aspirin (ASA) chewable tablet 81 mg,         nitroGLYcerin (NITROSTAT) sublingual tablet 0.4        mg        EKG shows NSR.  He was given a nitroglycerin, no improvement.  He was then given a second nitroglycerine and an aspirin to chew.  EMS was called and arrived to transport patient to his preferred hospital, [facility], due to scheduled procedure there in 2 days as well as the fact that his cardiologist is there.                    No follow-ups on file.    Treva Saleem NP  [facility]    Laith Tariq is a 72 year old who presents for the following health issues     HPI       Hospital Follow-up Visit:    Hospital/Nursing Home/ Rehab Facility: [facility]  Date of Admission: 4/15/22  Date of Discharge: 4/18/22  Reason(s) for Admission: Chest Pain      Was your hospitalization related to COVID-19? No   Problems taking medications regularly:  None  Medication changes since discharge: None  Problems adhering to non-medication therapy:  None    Summary of hospitalization:  CareEverywhere information obtained and reviewed  Diagnostic Tests/Treatments reviewed.  Follow up needed:   Other Healthcare Providers Involved in Patient s Care:           Update since discharge: worsened. He felt lightheaded today and once he arrived at clinic, started to have some mild chest pain and mild shortness of breath.  Symptoms are not as bad as when he was just hospitalized, but they did not improved after administration of nitroglycerin.   Post Discharge Medication Reconciliation: .  Plan of care communicated with patient                  Review of Systems         Objective    /82   Pulse 73   Temp 98.4  F (36.9  C) (Temporal)   Resp 16   Wt 91.6 kg (202  lb)   SpO2 95%   BMI 30.71 kg/m    Body mass index is 30.71 kg/m .  Physical Exam   GENERAL: alert and no distress  RESP: lungs clear to auscultation - no rales, rhonchi or wheezes  CV: regular rate and rhythm, normal S1 S2, no S3 or S4, no murmur, click or rub, no peripheral edema and peripheral pulses strong

## 2022-05-06 NOTE — PROGRESS NOTES
Assessment & Plan     ASCVD (arteriosclerotic cardiovascular disease)  Following with Cardiology through [facility], waiting on scheduling of upcoming procedure.  We discussed emergency symptoms and when to present to the ER, including chest discomfort, jaw or arm pain, palpitations, increased fatigue or syncope, increased dyspnea, swelling, or fevers.  - Comprehensive metabolic panel (BMP + Alb, Alk Phos, ALT, AST, Total. Bili, TP); Future  - CBC with platelets; Future  - Comprehensive metabolic panel (BMP + Alb, Alk Phos, ALT, AST, Total. Bili, TP)  - CBC with platelets    Fatigue, unspecified type  Mild anemia likely due to multiple procedures, hospitalizations, and lab draws.  Likely related to coronary function- has follow up with his cardiologist  - CBC with platelets; Future  - CBC with platelets    Elevated creatine kinase  Likely secondary to multiple procedures with contrast.  Recommended oral rehydration, especially with repeat procedure in a couple weeks.  Will recheck renal function today.  - Comprehensive metabolic panel (BMP + Alb, Alk Phos, ALT, AST, Total. Bili, TP); Future  - Comprehensive metabolic panel (BMP + Alb, Alk Phos, ALT, AST, Total. Bili, TP)          Return for Follow up if symptoms worsen or fail to improve.    JEREMY Villavicencio [facility]    Laith Tariq is a 72 year old who presents for the following health issues     HPI       Hospital Follow-up Visit:    Hospital/Nursing Home/ Rehab Facility: [facility]   Date of Admission: 4-28-22  Date of Discharge: 4-30-22  Reason(s) for Admission: DEPRESSION/ANXIETY  Hx of Angioedema  BPH (benign prostatic hypertrophy)  Obstructive Sleep Apnea  Complicated migraine  Mixed hyperlipidemia (Initial LDL - 143, LDL goal < 70)  CKD (chronic kidney disease) stage 2, GFR 60-89 ml/min  Hypertension  Obesity  History of left-sided carotid endarterectomy  Hypertensive urgency          Was your  "hospitalization related to COVID-19? No   Problems taking medications regularly:  Clonidine / doxazosin / hydroxyzine - lightheadedness   Medication changes since discharge:   Problems adhering to non-medication therapy:  None    Summary of hospitalization:  CareEverywhere information obtained and reviewed  Diagnostic Tests/Treatments reviewed.  Follow up needed: Cardiology/pulmonology  Other Healthcare Providers Involved in Patient s Care:         None  Update since discharge: stable.   Post Discharge Medication Reconciliation: discharge medications reconciled, continue medications without change.  Plan of care communicated with patient          Hospital at the of March.  Could not have angiography due to not having correct catheters.  Went into hospital several times since due to symptoms- was told not having acute heart attack.      Went in on the 4/28- did and angiogram/plasty on 2 arteries.  Had to stop procedure because BP unstable.  A couple days later, had another angioplasty.  Now needs to be rescheduled for brachytherapy.  Hoping to do in next week or 2 through [facility].    Feeling pretty \"lousy\".  Fatigue and dizziness/lightheadedness.  Couple days was feeling better.  Orthostatic hypotension when standing or feeling like might pass out.  No nausea or vomiting. No CP or palpitations.  Feeling short of breath with activity. Trying to drink fluids, but not enough as he should.  No swelling in feet or legs, no issues with laying flat at night.  No fevers    When in the hospital, did CT and diagnosed with interstitial lung disease.  Pulmonologist states it is mild.  Thinks all symptoms are heart related.   Dyspnea primarily with exertion.     Review of Systems   Constitutional, HEENT, cardiovascular, pulmonary, gi and gu systems are negative, except as otherwise noted.      Objective    There were no vitals taken for this visit.  There is no height or weight on file to calculate BMI.  Physical Exam   GENERAL: " alert, no distress and fatigued  EYES: Eyes grossly normal to inspection, PERRL and conjunctivae and sclerae normal  NECK: no adenopathy, no asymmetry, masses, or scars and thyroid normal to palpation  RESP: lungs clear to auscultation - no rales, rhonchi or wheezes  CV: regular rate and rhythm, normal S1 S2, no S3 or S4, no murmur, click or rub, no peripheral edema and peripheral pulses strong  ABDOMEN: soft, nontender, no hepatosplenomegaly, no masses and bowel sounds normal  MS: no gross musculoskeletal defects noted, no edema.  Bilateral groin sites soft with diffuse old bruising, no signs of hematoma.

## 2022-05-10 NOTE — TELEPHONE ENCOUNTER
ranolazine (RANEXA) 12 hr tablet (Discontinued)      Last Written Prescription Date:  none  Last Fill Quantity: 500 mg,   # refills: 4-30-22  Last Office Visit: 5-06-22  Future Office visit:       Routing refill request to provider for review/approval because:  Medication is reported/historical

## 2022-05-11 NOTE — TELEPHONE ENCOUNTER
Tina: your name is on the most recent prescription but it seems to me cardiology is managing dosing.  Pended 90 days of 20 mg three times daily below if you are indeed prescribing the increased dose (as of 4/1/22).     OLIVER Callahan RN  [facility]

## 2022-05-12 NOTE — TELEPHONE ENCOUNTER
Snackr message sent to patient to request doxazosin through his cardiologist.    OLIVER Callahan RN  [facility]

## 2022-05-12 NOTE — TELEPHONE ENCOUNTER
Routing refill request to provider for review/approval because:  Doxazosin Medication is reported/historical        Ezetimibe- last ordered two years ago    MICHELLE GarciaN RN  [facility]

## 2022-05-12 NOTE — TELEPHONE ENCOUNTER
Refused with comment of cardiology through [facility] to prescribe.     Aurea Boo, BSN RN  [facility]

## 2022-06-23 NOTE — TELEPHONE ENCOUNTER
[facility] for pt.  Pt is scheduled for a hospital follow up with Dr. Pierce at 12:00pm today.  Dr. Pierce is not accepting new patients, and for a follow up on a chronic health condition (Hospital follow up) pt should be scheduled with his pcp or a provider at his primary care clinic.  There is availability at [place] tomorrow, please assist pt in rescheduling.

## 2022-06-24 PROBLEM — R07.9 CHEST PAIN: Status: ACTIVE | Noted: 2022-01-01

## 2022-06-24 PROBLEM — I16.0 HYPERTENSIVE URGENCY: Status: ACTIVE | Noted: 2022-01-01

## 2022-06-24 NOTE — PROGRESS NOTES
Assessment & Plan     Braeden was seen today for hospital f/u.    Diagnoses and all orders for this visit:    ASCVD (arteriosclerotic cardiovascular disease)  Atherosclerosis of coronary artery of native heart, unspecified vessel or lesion type, unspecified whether angina present  S/p balloon angioplasty; w/ POBA to the mid LAD, and proximal-mid left circumflex with a Cutting Balloon; admitted overnight for Obs stay; has scheduled follow up with Dr. Chen for brachytherapy; no chest pain or shortness of breath today; nitroglycerin for prn use not needed since discharge; BP stable ; LDL 95 (3/2022) recommend LDL <70; follow up lipid today; BP goal <130/80; BMP follow up Scr 1.4   -     Lipid Profile; Future  -     Basic metabolic panel; Future  -     CBC with platelets; Future  -    Cardiology follow up as scheduled.      FUTURE APPOINTMENTS:       - Follow-up visit in cardiology; pcp       [facility]EMY [facility]      72 year old  year old male with PMH   Patient Active Problem List   Diagnosis Code     Coronary atherosclerosis I25.10     Dysthymic disorder F34.1     Hypertrophy (benign) of prostate N40.0     Idiopathic urticaria L50.1     ANXIETY  F41.1     Esophageal reflux K21.9     Idiopathic angioedema T78.3XXA     Hyperlipidemia LDL goal <70 E78.5     Carotid artery stenosis I65.29     Advanced directives, counseling/discussion Z71.89     Hypertension goal BP (blood pressure) < 140/90 I10     Achilles bursitis or tendinitis M76.60     Sleep apnea G47.30     Jaw pain R68.84     ASCVD (arteriosclerotic cardiovascular disease) I25.10     NSTEMI (non-ST elevated myocardial infarction) (H) I21.4     CKD (chronic kidney disease) stage 2, GFR 60-89 ml/min N18.2     Complicated migraine G43.109     Family history of ischemic heart disease Z82.49     Heart palpitations R00.2     History of left-sided carotid endarterectomy Z98.890     Other symptoms involving cardiovascular  system R09.89     Peripheral arterial disease (H) I73.9     Pseudoaneurysm following procedure (H) T81.718A, I72.9     Statin intolerance Z78.9     Vision disturbance H53.9     Mixed hyperlipidemia E78.2     Hypertension I10     Chest pain R07.9     Hypertensive urgency I16.0     in clinic for acute office visit related to/establish care/to discuss   Follow up hospitalization [facility] s/p ED visit on 4/25 for chest pain following office appointment for same; patient was pending a angiogram for stent occlusion. Seen by cards w/angio on 3/24 which showed left main artery with minimal disease; LAD severely diseased in-stent restenosis in distal segment, with complete occlusion of distal vessel; pending staged intervention of circumflex with brachytherapy;     4/28-30/2022 observation s/p coronary angiography on 4/28 which was aborted d/t hypo/hypertension; he received intervention of ballon angioplasty; POBA to mid LAD and proximal mid left circumflex; with scheduled brachytherapy in several weeks. Today patient reports feeling well; no chest pain, shortness of breath.     Laith Tariq is a 72 year old presenting for the following health issues: Hospital F/U (Heart Surgery )      [facility]     72 year old  year old male with PMH   Patient Active Problem List   Diagnosis Code     Coronary atherosclerosis I25.10     Dysthymic disorder F34.1     Hypertrophy (benign) of prostate N40.0     Idiopathic urticaria L50.1     ANXIETY  F41.1     Esophageal reflux K21.9     Idiopathic angioedema T78.3XXA     Hyperlipidemia LDL goal <70 E78.5     Carotid artery stenosis I65.29     Advanced directives, counseling/discussion Z71.89     Hypertension goal BP (blood pressure) < 140/90 I10     Achilles bursitis or tendinitis M76.60     Sleep apnea G47.30     Jaw pain R68.84     ASCVD (arteriosclerotic cardiovascular disease) I25.10     NSTEMI (non-ST elevated myocardial infarction) (H) I21.4     CKD (chronic kidney disease) stage 2,  "GFR 60-89 ml/min N18.2     Complicated migraine G43.109     Family history of ischemic heart disease Z82.49     Heart palpitations R00.2     History of left-sided carotid endarterectomy Z98.890     Other symptoms involving cardiovascular system R09.89     Peripheral arterial disease (H) I73.9     Pseudoaneurysm following procedure (H) T81.718A, I72.9     Statin intolerance Z78.9     Vision disturbance H53.9     Mixed hyperlipidemia E78.2     Hypertension I10     Chest pain R07.9     Hypertensive urgency I16.0     in clinic for acute office visit related to/establish care/to discuss     Hospital Follow-up Visit:    Hospital/Nursing Home/IP Rehab Facility: [facility]   Date of Admission: 4/28/2022  Date of Discharge: 4/30/2020  Reason(s) for Admission: Heart Surgery     Was your hospitalization related to COVID-19? No   Problems taking medications regularly:  None  Medication changes since discharge: None  Problems adhering to non-medication therapy:  None      Summary of hospitalization:  CareEverywhere information obtained and reviewed  Diagnostic Tests/Treatments reviewed.  Follow up needed: metabolic follow up  Other Healthcare Providers Involved in Patient s Care:         Specialist appointment - cardiac rehab and cardiology   Update since discharge: improved. ost Discharge Medication Reconciliation: discharge medications reconciled, continue medications without change.  Plan of care communicated with patient         Review of Systems   Constitutional, HEENT, cardiovascular, pulmonary, gi and gu systems are negative, except as otherwise noted.      Objective    Pulse 98   Temp 97.2  F (36.2  C) (Temporal)   Ht 1.702 m (5' 7\")   Wt 90.7 kg (200 lb)   SpO2 92%   BMI 31.32 kg/m    Body mass index is 31.32 kg/m .     Physical Exam   GENERAL: healthy, alert and no distress  NECK: no adenopathy, no asymmetry, masses, or scars and thyroid normal to palpation  RESP: lungs clear to auscultation - no rales, rhonchi or " wheezes  CV: regular rate and rhythm, normal S1 S2, no S3 or S4, no murmur, click or rub, no peripheral edema and peripheral pulses strong  ABDOMEN: soft, nontender, no hepatosplenomegaly, no masses and bowel sounds normal  MS: no gross musculoskeletal defects noted, no edema                    .  ..  Answers for HPI/ROS submitted by the patient on 6/24/2022  If you checked off any problems, how difficult have these problems made it for you to do your work, take care of things at home, or get along with other people?: Somewhat difficult  PHQ9 TOTAL SCORE: 3

## 2022-07-03 NOTE — TELEPHONE ENCOUNTER
Plavix Passed 07/01/2022 05:30 AM   Protocol Details  No active PPI on record unless is Protonix    Normal HGB on file in past 12 months    Normal Platelets on file in past 12 months    Recent (12 mo) or future (30 days) visit within the authorizing provider's specialty    Medication is active on med list    Patient is age 18 or older

## 2022-09-15 PROBLEM — C44.320 SQUAMOUS CELL CARCINOMA OF SKIN OF FACE: Status: ACTIVE | Noted: 2022-01-01

## 2022-09-15 NOTE — PROGRESS NOTES
Assessment & Plan     Inverse psoriasis  I think the indentation of his lateral left calf is an increased prominence of the anatomic groove between muscles that is exacerbated by the induration/contracture of the overlying skin.  No evidence to suggest DVT and he is very low risk by Wells criteria.  He has been using hydrocortisone 2.5% cream for his psoriasis so I did give him a stronger (mid-potency) cream to use as well and advised he alternate use based on severity of the psoriasis plaque.  - fluticasone (CUTIVATE) 0.05 % external cream  Dispense: 60 g; Refill: 0    Atherosclerosis of native coronary artery of native heart with stable angina pectoris (H)  Severe - lifelong DAPT.  - clopidogrel (PLAVIX) 75 MG tablet  Dispense: 90 tablet; Refill: 3    Need for prophylactic vaccination and inoculation against influenza  - INFLUENZA, QUAD, HIGH DOSE, PF, 65YR + (FLUZONE HD)    High priority for 2019-nCoV vaccine  - COVID-19,PF,MODERNA BIVALENT 18+Yrs    No follow-ups on file.    Donna Olvera MD  [facility]        Subjective   Marciano is a 72 year old, presenting for the following health issues:  Derm Problem (Indentation in leg ), Imm/Inj (Flu Shot), and Imm/Inj (COVID-19 VACCINE)      History of Present Illness       Reason for visit:  Indentation in my leg  Symptom onset:  3-4 weeks ago  Symptoms include:  Indentation in skin plus various psoriasis patches that are not healing  Symptom intensity:  Moderate  Symptom progression:  Worsening  Had these symptoms before:  No  What makes it worse:  No  What makes it better:  No    He eats 4 or more servings of fruits and vegetables daily.He consumes 0 sweetened beverage(s) daily.He exercises with enough effort to increase his heart rate 20 to 29 minutes per day.  He exercises with enough effort to increase his heart rate 4 days per week.   He is taking medications regularly.     Marciano has significant, functionally-limiting CAD and PAD.   He also has psoriasis and a few weeks ago noted a psoriatic plaque on the lateral aspect of his left lower leg.  The plaque seemed to form or lie in an indentation of his outer leg and he became concerned about possible DVT.  He did complete a long driving trip earlier this summer to [place], [place] to visit his daughter.      He denies focal weakness symptoms - specifically in this leg or anywhere in his body.   No leg swelling, pain, or redness.  He denies leg claudication symptoms as his coronary symptoms limit his walking before he develops any leg claudication.   His chest symptoms are at baseline with no worsening although he did struggle with the altitude in [place] (which is apparently higher altitude than [place], [place]).     He's noticed some sharp pain over his right flank - below posterior right rib cage.    Review of Systems   as above       Objective    /77   Pulse 89   Temp 98.3  F (36.8  C) (Temporal)   Resp 18   Wt 89.4 kg (197 lb)   SpO2 94%   BMI 30.85 kg/m    Body mass index is 30.85 kg/m .  Physical Exam   GENERAL APPEARANCE: healthy, alert and no distress  EYES: Eyes grossly normal to inspection, conjunctivae and sclerae normal  SKIN: psoriatic plaques on out aspect of bilateral calves.  These lie in the slight vertical anatomic groove between the calf muscles; however the groove on the left leg is more prominent.  No warmth or redness.  No lesions along lower right thoracic dermatome  EXTREM: No calf tenderness to palpation or swelling.    MS: Mild focal tenderness to palpation over posterior lower right rib.    NEURO: Normal strength and tone, sensory exam grossly normal, mentation intact and speech normal  PSYCH: mentation appears normal and affect normal/bright

## 2022-10-06 PROBLEM — J84.9 INTERSTITIAL PULMONARY DISEASE, UNSPECIFIED (H): Status: ACTIVE | Noted: 2022-01-01

## 2022-10-06 NOTE — PROGRESS NOTES
"  {PROVIDER CHARTING PREFERENCE:421668}    Subjective   Marciano is a 72 year old{ACCOMPANIED BY STATEMENT (Optional):579772}, presenting for the following health issues:  ed follow up      HPI     ED/UC Followup:    Facility:  [facility]  Date of visit: 9/28/22  Reason for visit: Chest pain  Current Status: Discharged---chest pain has subsided by upping dosage of clonidine.    {additonal problems for provider to add (Optional):181405}    Review of Systems   {ROS COMP (Optional):797249}      Objective    /74 (BP Location: Right arm, Patient Position: Chair, Cuff Size: Adult Regular)   Pulse 56   Temp 97.6  F (36.4  C)   Resp 16   Ht 1.715 m (5' 7.5\")   Wt 89.4 kg (197 lb)   SpO2 98%   BMI 30.40 kg/m    Body mass index is 30.4 kg/m .  Physical Exam   {Exam List (Optional):049323}    {Diagnostic Test Results (Optional):034676}    {AMBULATORY ATTESTATION (Optional):333393}            "

## 2022-10-06 NOTE — PROGRESS NOTES
"  Assessment & Plan     Hospital discharge follow-up  Med rec completed.    Unstable angina (H)  Unfortunately, he is not a candidate for any intervention.  He has a good understanding of his health status and has been able to come to acceptance, especially after this most recent hospitalization.  He has consulted several cardiologists who are in agreement.  He does maintain exercise capacity and can return to the hospital if he requires pain control.  I refilled his nitroglycerine today.  He would like to try taking it prior to exercise to see if this increases his activity tolerance before angina sets in.  He continues to follow closely with cardiology and was provided with their triage information to assist him in managing his symptoms at home vs hospitalization.  Overall, he feels good increased dose of isosorbide.  - nitroGLYcerin (NITROSTAT) 0.4 MG sublingual tablet; Place 1 tablet (0.4 mg) under the tongue every 5 minutes as needed for chest pain    Advanced care planning/counseling discussion  We had a discussion about updating his HCD given the new information.  He was very appreciative to discuss this.  Provided a new copy of the directive to look over at home.  We discussed this can continue to be a fluid process, but it would be ideal if he completed the form while feeling well which gives him some control over the situation.                 BMI:   Estimated body mass index is 30.4 kg/m  as calculated from the following:    Height as of this encounter: 1.715 m (5' 7.5\").    Weight as of this encounter: 89.4 kg (197 lb).   Weight management plan: Discussed healthy diet and exercise guidelines        No follow-ups on file.    JEREMY Villavicencio [facility]    Laith Tariq is a 72 year old, presenting for the following health issues:  ed follow up    Facility:  [facility]  Date of visit: 9/28/22  Reason for visit: Chest pain  Current Status: Discharged---chest " pain has subsided by upping dosage of clonidine.      [facility]       Hospital Follow-up Visit:    Hospital/Nursing Home/IP Rehab Facility: [facility]  Date of Admission: 9/28/2022  Date of Discharge: 9/29/2022  Reason(s) for Admission: Chest pain  chest pain has subsided by upping dosage of clonidine.    Was your hospitalization related to COVID-19? No   Problems taking medications regularly:  None  Medication changes since discharge: isosorbide increased  Problems adhering to non-medication therapy:  None    Summary of hospitalization:  CareEverywhere information obtained and reviewed  Diagnostic Tests/Treatments reviewed.  Follow up needed: cardiology, arranged.  Other Healthcare Providers Involved in Patient s Care:         Specialist appointment - cardiology  Update since discharge: stable.   Post Medication Reconciliation Status: Discharge medications reconciled and changed, see notes/orders      Plan of care communicated with patient     Taking clonidine differently:  1/2 in AM, full at night.   Isordil was increased to 80mg at his cardiology apt.    Summary of Cards visits:  Bottom line, to dangerous for any intervention.  The reason to go to the hospital would be for pain relief.  Felt like he needed to go through this hospitalization to come to terms with this.      Question is when should I call the ambulance, understanding its when the pain is so bad.  Was told he could call triage at the [facility] for guidance by the hospitalist.    Graduated rehab, but going to extra care at [facility].  Unsure how he should be using nitroglycerine.  Felt it was a gateway to the ER because he was always directed there if he took nitro.  Has heart pain everyday to some level.  With exercise, increases, but he is able to resolve with nitroglycerine dose.      Review of Systems   Constitutional, HEENT, cardiovascular, pulmonary, gi and gu systems are negative, except as otherwise noted.      Objective    /74 (BP  "Location: Right arm, Patient Position: Chair, Cuff Size: Adult Regular)   Pulse 56   Temp 97.6  F (36.4  C)   Resp 16   Ht 1.715 m (5' 7.5\")   Wt 89.4 kg (197 lb)   SpO2 98%   BMI 30.40 kg/m    Body mass index is 30.4 kg/m .  Physical Exam   GENERAL: healthy, alert and no distress  EYES: Eyes grossly normal to inspection, PERRL and conjunctivae and sclerae normal  MS: no gross musculoskeletal defects noted, no edema  PSYCH: mentation appears normal, affect normal/bright                    "

## 2022-12-01 NOTE — TELEPHONE ENCOUNTER
C3 nurse spoke with Keely Pizarro for a TCC post hospital discharge follow up call. The patient has a scheduled HOSFU appointment with Vishal Anne on 12/07/2022 @ 1000.        Order signed.

## 2022-12-17 NOTE — TELEPHONE ENCOUNTER
Permission given tome by Marciano to speak with Gabrielle, his wife.  We do have a completed and signed consent to communicate with Gabrielle on file.    Home covid test is negative.    Symptoms started yesterday.  Cough - intermittent. Occasionally productive.  Lungs feel tight.  Increase in his lightheadedness.  O2 sat is 91-92%.  Chills during the night last night.  99.4 temp   Tired  Achy    Per the protocol, I instructed that Marciano be seen now in an ER.  Caller stated understanding and agreement.  Questions answered.    Luz GAN RN [facility]                           Reason for Disposition    [1] MODERATE difficulty breathing (e.g., speaks in phrases, SOB even at rest, pulse 100-120) AND [2] NEW-onset or WORSE than normal    Additional Information    Negative: SEVERE difficulty breathing (e.g., struggling for each breath, speaks in single words)    Negative: [1] Breathing stopped AND [2] hasn't returned    Negative: Choking on something    Negative: Bluish (or gray) lips or face now    Negative: Difficult to awaken or acting confused (e.g., disoriented, slurred speech)    Negative: Passed out (i.e., lost consciousness, collapsed and was not responding)    Negative: Wheezing started suddenly after medicine, an allergic food or bee sting    Negative: Stridor    Negative: Slow, shallow and weak breathing    Negative: Sounds like a life-threatening emergency to the triager    Protocols used: BREATHING DIFFICULTY-A-AH